# Patient Record
Sex: FEMALE | Race: OTHER | Employment: FULL TIME | URBAN - METROPOLITAN AREA
[De-identification: names, ages, dates, MRNs, and addresses within clinical notes are randomized per-mention and may not be internally consistent; named-entity substitution may affect disease eponyms.]

---

## 2018-08-31 ENCOUNTER — HOSPITAL ENCOUNTER (OUTPATIENT)
Dept: MRI IMAGING | Age: 68
Discharge: HOME | End: 2018-08-31
Payer: COMMERCIAL

## 2018-08-31 DIAGNOSIS — G95.19 NEUROGENIC CLAUDICATION (HCC): ICD-10-CM

## 2018-08-31 DIAGNOSIS — M54.50 LOW BACK PAIN: ICD-10-CM

## 2018-08-31 PROCEDURE — 72148 MRI LUMBAR SPINE W/O DYE: CPT

## 2018-10-10 ENCOUNTER — HOSPITAL ENCOUNTER (OUTPATIENT)
Dept: PREADMISSION TESTING | Age: 68
Discharge: HOME | End: 2018-10-10
Payer: MEDICARE

## 2018-10-10 VITALS
TEMPERATURE: 97.7 F | DIASTOLIC BLOOD PRESSURE: 76 MMHG | BODY MASS INDEX: 37.3 KG/M2 | HEART RATE: 50 BPM | SYSTOLIC BLOOD PRESSURE: 127 MMHG | HEIGHT: 64 IN | WEIGHT: 218.5 LBS

## 2018-10-10 LAB
ABO + RH BLD: NORMAL
ANION GAP SERPL CALC-SCNC: 7 MMOL/L (ref 5–15)
APPEARANCE UR: CLEAR
BACTERIA URNS QL MICRO: NEGATIVE /HPF
BILIRUB UR QL: NEGATIVE
BLOOD GROUP ANTIBODIES SERPL: NORMAL
BUN SERPL-MCNC: 16 MG/DL (ref 6–20)
BUN/CREAT SERPL: 24 (ref 12–20)
CALCIUM SERPL-MCNC: 8.7 MG/DL (ref 8.5–10.1)
CHLORIDE SERPL-SCNC: 106 MMOL/L (ref 97–108)
CO2 SERPL-SCNC: 28 MMOL/L (ref 21–32)
COLOR UR: NORMAL
CREAT SERPL-MCNC: 0.66 MG/DL (ref 0.55–1.02)
EPITH CASTS URNS QL MICRO: NORMAL /LPF
ERYTHROCYTE [DISTWIDTH] IN BLOOD BY AUTOMATED COUNT: 12.7 % (ref 11.5–14.5)
EST. AVERAGE GLUCOSE BLD GHB EST-MCNC: 111 MG/DL
GLUCOSE SERPL-MCNC: 87 MG/DL (ref 65–100)
GLUCOSE UR STRIP.AUTO-MCNC: NEGATIVE MG/DL
HBA1C MFR BLD: 5.5 % (ref 4.2–6.3)
HCT VFR BLD AUTO: 39.8 % (ref 35–47)
HGB BLD-MCNC: 12.7 G/DL (ref 11.5–16)
HGB UR QL STRIP: NEGATIVE
HYALINE CASTS URNS QL MICRO: NORMAL /LPF (ref 0–5)
INR PPP: 1 (ref 0.9–1.1)
KETONES UR QL STRIP.AUTO: NEGATIVE MG/DL
LEUKOCYTE ESTERASE UR QL STRIP.AUTO: NEGATIVE
MCH RBC QN AUTO: 30.8 PG (ref 26–34)
MCHC RBC AUTO-ENTMCNC: 31.9 G/DL (ref 30–36.5)
MCV RBC AUTO: 96.6 FL (ref 80–99)
NITRITE UR QL STRIP.AUTO: NEGATIVE
NRBC # BLD: 0 K/UL (ref 0–0.01)
NRBC BLD-RTO: 0 PER 100 WBC
PH UR STRIP: 6 [PH] (ref 5–8)
PLATELET # BLD AUTO: 286 K/UL (ref 150–400)
PMV BLD AUTO: 10.5 FL (ref 8.9–12.9)
POTASSIUM SERPL-SCNC: 4.3 MMOL/L (ref 3.5–5.1)
PROT UR STRIP-MCNC: NEGATIVE MG/DL
PROTHROMBIN TIME: 10.1 SEC (ref 9–11.1)
RBC # BLD AUTO: 4.12 M/UL (ref 3.8–5.2)
RBC #/AREA URNS HPF: NORMAL /HPF (ref 0–5)
SODIUM SERPL-SCNC: 141 MMOL/L (ref 136–145)
SP GR UR REFRACTOMETRY: 1.02 (ref 1–1.03)
SPECIMEN EXP DATE BLD: NORMAL
UA: UC IF INDICATED,UAUC: NORMAL
UROBILINOGEN UR QL STRIP.AUTO: 0.2 EU/DL (ref 0.2–1)
WBC # BLD AUTO: 6.4 K/UL (ref 3.6–11)
WBC URNS QL MICRO: NORMAL /HPF (ref 0–4)

## 2018-10-10 PROCEDURE — 86900 BLOOD TYPING SEROLOGIC ABO: CPT | Performed by: ORTHOPAEDIC SURGERY

## 2018-10-10 PROCEDURE — 80048 BASIC METABOLIC PNL TOTAL CA: CPT | Performed by: ORTHOPAEDIC SURGERY

## 2018-10-10 PROCEDURE — 83036 HEMOGLOBIN GLYCOSYLATED A1C: CPT | Performed by: ORTHOPAEDIC SURGERY

## 2018-10-10 PROCEDURE — 93005 ELECTROCARDIOGRAM TRACING: CPT

## 2018-10-10 PROCEDURE — 85027 COMPLETE CBC AUTOMATED: CPT | Performed by: ORTHOPAEDIC SURGERY

## 2018-10-10 PROCEDURE — 36415 COLL VENOUS BLD VENIPUNCTURE: CPT | Performed by: ORTHOPAEDIC SURGERY

## 2018-10-10 PROCEDURE — 85610 PROTHROMBIN TIME: CPT | Performed by: ORTHOPAEDIC SURGERY

## 2018-10-10 PROCEDURE — 81001 URINALYSIS AUTO W/SCOPE: CPT | Performed by: ORTHOPAEDIC SURGERY

## 2018-10-10 RX ORDER — IBUPROFEN 200 MG
600 TABLET ORAL
COMMUNITY
End: 2018-10-28

## 2018-10-10 RX ORDER — DOCUSATE SODIUM 100 MG/1
100 CAPSULE, LIQUID FILLED ORAL
COMMUNITY
End: 2018-10-31

## 2018-10-10 NOTE — PROGRESS NOTES
Problem: Patient Education: Go to Patient Education Activity Goal: Patient/Family Education Outcome: Progressing Towards Goal 
Attended pre-op spine class. Questions, concerns, and comments were addressed.

## 2018-10-11 LAB
ATRIAL RATE: 51 BPM
BACTERIA SPEC CULT: NORMAL
BACTERIA SPEC CULT: NORMAL
CALCULATED R AXIS, ECG10: -6 DEGREES
CALCULATED T AXIS, ECG11: 141 DEGREES
DIAGNOSIS, 93000: NORMAL
P-R INTERVAL, ECG05: 158 MS
Q-T INTERVAL, ECG07: 442 MS
QRS DURATION, ECG06: 90 MS
QTC CALCULATION (BEZET), ECG08: 407 MS
SERVICE CMNT-IMP: NORMAL
VENTRICULAR RATE, ECG03: 51 BPM

## 2018-10-23 NOTE — H&P
Melvin Orozco Location: [place] [address] [address] Patient #: [id number] : 1950 Single / Language: [place] / [place]ana [place]: Rose Hammond Female History of Present Illness The patient is a 80year old female who presents for a Recheck of Chronic lumbar back pain. The last clinic visit was 1 month(s) ago. Management changes made at the last visit include ordering test(s) (MRI). Symptoms include pain. Symptoms are located in the low back and in the right low back. The pain radiates to the right buttock. The patient describes the pain as aching and burning. The symptoms occur constantly. The patient describes symptoms as severe and worsening. Symptoms are exacerbated by weight bearing, standing, sitting and supine position. Symptoms are relieved by rest. Current treatment includes nonsteroidal anti-inflammatory drugs. Recently the disease has been worsening. The patient was previously evaluated in this clinic 1 month(s) ago. Past evaluation has included x-ray of the lumbar spine () and MRI of the lumbar spine. Past treatment has included nonsteroidal anti-inflammatory drugs. Problem List/Past Medical  
REVIEW OF SYSTEMS: Systems were reviewed by the provider.   
Primary osteoarthritis of left hand (715.14  M19.042)   Weight above 97th percentile (V49.89  Z78.9)   
Pain of left hand (729.5  M79.642)   Low back pain with radiation (724.3  M54.40)   
Lumbar facet arthropathy (721.3  M47.816)   Bilateral buttock pain (729.1  M79.1)   Degenerative scoliosis in adult patient (733.90  M41.50)   
Spondylolisthesis, grade 1 (738.4  M43.10)   Allergies No Known Drug Allergies Family History Cerebrovascular Accident   Mother. Heart disease in male family member before age 54   
Hypertension   Mother. Social History Caffeine use   5-6 drinks per day, Coffee. Current work status   Full-time. Exercise   5-6 times per week, walking. Marital status   Single.  
No alcohol use   
 No drug use   
Seat Belt Use   Always uses seat belts. Sun Exposure   Occasionally. Tobacco / smoke exposure   Family members smoke outdoors only. Tobacco use   Former smoker. Medication History Medications Reconciled  
 
Pregnancy / Birth History Pregnant   No. 
 
Past Surgical History Appendectomy   
Breast Biopsy   right Foot Surgery   left Hysterectomy   non-cancerous: complete and abdominal 
 
Diagnostic Studies History MRI, Spine   
 
Other Problems No pertinent past medical history   
Unspecified Diagnosis   
 
 
Physical Exam  
Neurologic Sensory Light Touch - Intact - Globally. Overall Assessment of Muscle Strength and Tone reveals Lower Extremities - Right Iliopsoas - 5/5. Left Iliopsoas - 5/5. Right Tibialis Anterior - 5/5. Left Tibialis Anterior - 5/5. Right Gastroc-Soleus - 5/5. Left Gastroc-Soleus - 5/5. Right EHL - 3+/5. Left EHL - 4-/5. General Assessment of Reflexes Right Ankle - Clonus is not present. Left Ankle - Clonus is not present. Reflexes (Dermatomes) 2/2 Normal - Left Achilles (L5-S2), Left Knee (L2-4), Right Achilles (L5-S2) and Right Knee (L2-4). Musculoskeletal 
Global Assessment Examination of related systems reveals - well-developed, well-nourished, in no acute distress, alert and oriented x 3. Gait and Station - normal gait and station and normal posture. Right Lower Extremity - normal strength and tone, normal range of motion without pain and no instability, subluxation or laxity. Left Lower Extremity - normal strength and tone, normal range of motion without pain and no instability, subluxation or laxity. Spine/Ribs/Pelvis Cervical Spine - Examination of the cervical spine reveals - no tenderness to palpation, no pain, no swelling, edema or erythema, normal cervical spine movements and normal sensation.  Thoracic (Dorsal) Spine - Examination of the thoracic spine reveals - no tenderness over thoracic vertebrae, no pain, normal sensation and normal thoracic spine movements. Lumbosacral Spine - Examination of the lumbosacral spine reveals - no known fractures or deformities. Inspection and Palpation - Tenderness - moderate. Assessment of pain reveals the following findings - The pain is characterized as - moderate. Location - pain refers to lower back bilaterally. ROJM - Trunk Extension - 15 degrees. Lumbar Spine Flexion - . Lumbosacral Spine - Functional Testing - Straight Leg Raising Test positive (Positive at 45 degrees.), Babinski Test negative, Prone Knee Bending Test negative, Slump Test negative. Assessment & Plan Spondylolisthesis, grade 1 (738.4  M43.10) Impression: The patient has failed conservative treatment. She has a degenerative scoliosis as well as severe spinal stenosis from L3-S1. I think she candidate for a lumbar laminectomy from L2-S1 with a posterior lumbar fusion at L3-4 and L4-5. She has 9 mm spondylosis C6 at L4-5 and a 4 mm spondylolisthesis at L3-4. The L3-4 spondylolisthesis reduces completely with extension and the L4-5 spondylosis is reduces to approximately 5 mm. The risks and benefits were discussed at length with the patient and the patient has elected to proceed. Indications for surgery include failed conservative treatment. Alternative treatments, risks and the perioperative course were discussed with the patient. All questions were answered. The risks and benefits of the procedure were explained. Benefits include definitive diagnosis, relief of pain, elimination of deformity and improved function. Risks of surgery including bleeding, infection, weakness, numbness, CSF leak, failure to improve symptoms, exacerbation of medical co-morbidities and even death were discussed with the patient. Degenerative scoliosis in adult patient (733.90  M41.50) Lumbar stenosis with neurogenic claudication (724.03  M48.062) Treatment options were discussed with the patient in full.(V65.49) Current Plans Pt Education - How to access health information online: discussed with patient and provided information. Pt Education - Educational materials were provided.: discussed with patient and provided information. Presurgical planning was preformed with the patient today Surgery to be scheduled PRocedure: L3-S1 lumbar laminectomy with PLF at L3-5 Signed by Judith Aguillon MD 
 
Date of Surgery Update: 
Elan Zavala was seen and examined. History and physical has been reviewed. The patient has been examined.  There have been no significant clinical changes since the completion of the originally dated History and Physical. 
 
Signed By: Judith Augillon MD   
 October 25, 2018 12:07 PM

## 2018-10-25 ENCOUNTER — ANESTHESIA (OUTPATIENT)
Dept: SURGERY | Age: 68
DRG: 455 | End: 2018-10-25
Payer: COMMERCIAL

## 2018-10-25 ENCOUNTER — APPOINTMENT (OUTPATIENT)
Dept: GENERAL RADIOLOGY | Age: 68
DRG: 455 | End: 2018-10-25
Attending: ORTHOPAEDIC SURGERY
Payer: COMMERCIAL

## 2018-10-25 ENCOUNTER — HOSPITAL ENCOUNTER (INPATIENT)
Age: 68
LOS: 3 days | Discharge: HOME HEALTH | DRG: 455 | End: 2018-10-28
Attending: ORTHOPAEDIC SURGERY | Admitting: ORTHOPAEDIC SURGERY
Payer: COMMERCIAL

## 2018-10-25 ENCOUNTER — ANESTHESIA EVENT (OUTPATIENT)
Dept: SURGERY | Age: 68
DRG: 455 | End: 2018-10-25
Payer: COMMERCIAL

## 2018-10-25 DIAGNOSIS — M48.061 SPINAL STENOSIS OF LUMBAR REGION WITHOUT NEUROGENIC CLAUDICATION: Primary | ICD-10-CM

## 2018-10-25 LAB
ABO + RH BLD: NORMAL
BLOOD GROUP ANTIBODIES SERPL: NORMAL
SPECIMEN EXP DATE BLD: NORMAL

## 2018-10-25 PROCEDURE — 77030037713 HC CLOSR DEV INCIS ZIP STRY -B: Performed by: ORTHOPAEDIC SURGERY

## 2018-10-25 PROCEDURE — 77030018836 HC SOL IRR NACL ICUM -A: Performed by: ORTHOPAEDIC SURGERY

## 2018-10-25 PROCEDURE — C1713 ANCHOR/SCREW BN/BN,TIS/BN: HCPCS | Performed by: ORTHOPAEDIC SURGERY

## 2018-10-25 PROCEDURE — 0ST20ZZ RESECTION OF LUMBAR VERTEBRAL DISC, OPEN APPROACH: ICD-10-PCS | Performed by: ORTHOPAEDIC SURGERY

## 2018-10-25 PROCEDURE — 77030034850: Performed by: ORTHOPAEDIC SURGERY

## 2018-10-25 PROCEDURE — 74011250636 HC RX REV CODE- 250/636: Performed by: PHYSICIAN ASSISTANT

## 2018-10-25 PROCEDURE — 77030031139 HC SUT VCRL2 J&J -A: Performed by: ORTHOPAEDIC SURGERY

## 2018-10-25 PROCEDURE — 77030012406 HC DRN WND PENRS BARD -A: Performed by: ORTHOPAEDIC SURGERY

## 2018-10-25 PROCEDURE — 77030038600 HC TU BPLR IRR DISP STRY -B: Performed by: ORTHOPAEDIC SURGERY

## 2018-10-25 PROCEDURE — 86900 BLOOD TYPING SEROLOGIC ABO: CPT | Performed by: ORTHOPAEDIC SURGERY

## 2018-10-25 PROCEDURE — 77030034475 HC MISC IMPL SPN: Performed by: ORTHOPAEDIC SURGERY

## 2018-10-25 PROCEDURE — 77030014647 HC SEAL FBRN TISSL BAXT -D: Performed by: ORTHOPAEDIC SURGERY

## 2018-10-25 PROCEDURE — 77030004391 HC BUR FLUT MEDT -C: Performed by: ORTHOPAEDIC SURGERY

## 2018-10-25 PROCEDURE — 74011250636 HC RX REV CODE- 250/636

## 2018-10-25 PROCEDURE — 65270000029 HC RM PRIVATE

## 2018-10-25 PROCEDURE — 76010000173 HC OR TIME 3 TO 3.5 HR INTENSV-TIER 1: Performed by: ORTHOPAEDIC SURGERY

## 2018-10-25 PROCEDURE — 77030012893

## 2018-10-25 PROCEDURE — 72020 X-RAY EXAM OF SPINE 1 VIEW: CPT

## 2018-10-25 PROCEDURE — 77030022373 HC SPCR SPN STAXX SPWV -K1: Performed by: ORTHOPAEDIC SURGERY

## 2018-10-25 PROCEDURE — 77030032490 HC SLV COMPR SCD KNE COVD -B: Performed by: ORTHOPAEDIC SURGERY

## 2018-10-25 PROCEDURE — 77030038020 HC MANFLD NEPTUNE STRY -B: Performed by: ORTHOPAEDIC SURGERY

## 2018-10-25 PROCEDURE — 0SG1071 FUSION OF 2 OR MORE LUMBAR VERTEBRAL JOINTS WITH AUTOLOGOUS TISSUE SUBSTITUTE, POSTERIOR APPROACH, POSTERIOR COLUMN, OPEN APPROACH: ICD-10-PCS | Performed by: ORTHOPAEDIC SURGERY

## 2018-10-25 PROCEDURE — 74011250636 HC RX REV CODE- 250/636: Performed by: ANESTHESIOLOGY

## 2018-10-25 PROCEDURE — 77030026438 HC STYL ET INTUB CARD -A: Performed by: NURSE ANESTHETIST, CERTIFIED REGISTERED

## 2018-10-25 PROCEDURE — 36415 COLL VENOUS BLD VENIPUNCTURE: CPT | Performed by: ORTHOPAEDIC SURGERY

## 2018-10-25 PROCEDURE — 74011000250 HC RX REV CODE- 250

## 2018-10-25 PROCEDURE — 76060000037 HC ANESTHESIA 3 TO 3.5 HR: Performed by: ORTHOPAEDIC SURGERY

## 2018-10-25 PROCEDURE — 74011250637 HC RX REV CODE- 250/637: Performed by: PHYSICIAN ASSISTANT

## 2018-10-25 PROCEDURE — 77030008684 HC TU ET CUF COVD -B: Performed by: NURSE ANESTHETIST, CERTIFIED REGISTERED

## 2018-10-25 PROCEDURE — 74011250636 HC RX REV CODE- 250/636: Performed by: ORTHOPAEDIC SURGERY

## 2018-10-25 PROCEDURE — 74011000250 HC RX REV CODE- 250: Performed by: PHYSICIAN ASSISTANT

## 2018-10-25 PROCEDURE — 77030020782 HC GWN BAIR PAWS FLX 3M -B

## 2018-10-25 PROCEDURE — 77030013079 HC BLNKT BAIR HGGR 3M -A: Performed by: NURSE ANESTHETIST, CERTIFIED REGISTERED

## 2018-10-25 PROCEDURE — 76210000016 HC OR PH I REC 1 TO 1.5 HR: Performed by: ORTHOPAEDIC SURGERY

## 2018-10-25 PROCEDURE — 77030029099 HC BN WAX SSPC -A: Performed by: ORTHOPAEDIC SURGERY

## 2018-10-25 PROCEDURE — 65270000032 HC RM SEMIPRIVATE

## 2018-10-25 PROCEDURE — 76001 XR FLUOROSCOPY OVER 60 MINUTES: CPT

## 2018-10-25 PROCEDURE — 74011000250 HC RX REV CODE- 250: Performed by: ORTHOPAEDIC SURGERY

## 2018-10-25 PROCEDURE — 0SG3071 FUSION OF LUMBOSACRAL JOINT WITH AUTOLOGOUS TISSUE SUBSTITUTE, POSTERIOR APPROACH, POSTERIOR COLUMN, OPEN APPROACH: ICD-10-PCS | Performed by: ORTHOPAEDIC SURGERY

## 2018-10-25 PROCEDURE — 0SG10AJ FUSION OF 2 OR MORE LUMBAR VERTEBRAL JOINTS WITH INTERBODY FUSION DEVICE, POSTERIOR APPROACH, ANTERIOR COLUMN, OPEN APPROACH: ICD-10-PCS | Performed by: ORTHOPAEDIC SURGERY

## 2018-10-25 DEVICE — TOP LOADING BODY
Type: IMPLANTABLE DEVICE | Site: SPINE LUMBAR | Status: FUNCTIONAL
Brand: FIREBIRD NXG

## 2018-10-25 DEVICE — GRAFT BNE SUB L CANC FRZN MORSELIZED W/ VIABLE CELL TRINITY: Type: IMPLANTABLE DEVICE | Site: SPINE LUMBAR | Status: FUNCTIONAL

## 2018-10-25 DEVICE — SET SCREW
Type: IMPLANTABLE DEVICE | Site: SPINE LUMBAR | Status: FUNCTIONAL
Brand: FIREBIRD NXG

## 2018-10-25 DEVICE — CAGE SPNL W9XH8XL25MM 15DEG LORDTC EXP CART LO PROF IMPL: Type: IMPLANTABLE DEVICE | Site: SPINE LUMBAR | Status: FUNCTIONAL

## 2018-10-25 DEVICE — GRAFT BNE SUB 7.5GM PTTY SYN SIGNAFUSE: Type: IMPLANTABLE DEVICE | Site: SPINE LUMBAR | Status: FUNCTIONAL

## 2018-10-25 DEVICE — 6.5MM X 45MM CORTICAL BONE SCREW
Type: IMPLANTABLE DEVICE | Site: SPINE LUMBAR | Status: FUNCTIONAL
Brand: JANUS

## 2018-10-25 DEVICE — 65MM ROD, PRE-LORDOSED
Type: IMPLANTABLE DEVICE | Site: SPINE LUMBAR | Status: FUNCTIONAL
Brand: FIREBIRD

## 2018-10-25 RX ORDER — ONDANSETRON 2 MG/ML
4 INJECTION INTRAMUSCULAR; INTRAVENOUS AS NEEDED
Status: DISCONTINUED | OUTPATIENT
Start: 2018-10-25 | End: 2018-10-25 | Stop reason: HOSPADM

## 2018-10-25 RX ORDER — FENTANYL CITRATE 50 UG/ML
50 INJECTION, SOLUTION INTRAMUSCULAR; INTRAVENOUS AS NEEDED
Status: DISCONTINUED | OUTPATIENT
Start: 2018-10-25 | End: 2018-10-25 | Stop reason: HOSPADM

## 2018-10-25 RX ORDER — MORPHINE SULFATE 4 MG/ML
INJECTION, SOLUTION INTRAMUSCULAR; INTRAVENOUS AS NEEDED
Status: DISCONTINUED | OUTPATIENT
Start: 2018-10-25 | End: 2018-10-25 | Stop reason: HOSPADM

## 2018-10-25 RX ORDER — AMOXICILLIN 250 MG
1 CAPSULE ORAL 2 TIMES DAILY
Status: DISCONTINUED | OUTPATIENT
Start: 2018-10-25 | End: 2018-10-28 | Stop reason: HOSPADM

## 2018-10-25 RX ORDER — SODIUM CHLORIDE 0.9 % (FLUSH) 0.9 %
5-10 SYRINGE (ML) INJECTION AS NEEDED
Status: DISCONTINUED | OUTPATIENT
Start: 2018-10-25 | End: 2018-10-28 | Stop reason: HOSPADM

## 2018-10-25 RX ORDER — NALOXONE HYDROCHLORIDE 0.4 MG/ML
0.4 INJECTION, SOLUTION INTRAMUSCULAR; INTRAVENOUS; SUBCUTANEOUS AS NEEDED
Status: DISCONTINUED | OUTPATIENT
Start: 2018-10-25 | End: 2018-10-28 | Stop reason: HOSPADM

## 2018-10-25 RX ORDER — HYDROCODONE BITARTRATE AND ACETAMINOPHEN 5; 325 MG/1; MG/1
1 TABLET ORAL
Status: DISCONTINUED | OUTPATIENT
Start: 2018-10-25 | End: 2018-10-28 | Stop reason: HOSPADM

## 2018-10-25 RX ORDER — KETOROLAC TROMETHAMINE 30 MG/ML
15 INJECTION, SOLUTION INTRAMUSCULAR; INTRAVENOUS EVERY 6 HOURS
Status: COMPLETED | OUTPATIENT
Start: 2018-10-26 | End: 2018-10-26

## 2018-10-25 RX ORDER — EPHEDRINE SULFATE 50 MG/ML
INJECTION, SOLUTION INTRAVENOUS AS NEEDED
Status: DISCONTINUED | OUTPATIENT
Start: 2018-10-25 | End: 2018-10-25 | Stop reason: HOSPADM

## 2018-10-25 RX ORDER — DIPHENHYDRAMINE HCL 25 MG
25 CAPSULE ORAL
Status: DISCONTINUED | OUTPATIENT
Start: 2018-10-25 | End: 2018-10-28 | Stop reason: HOSPADM

## 2018-10-25 RX ORDER — PROPOFOL 10 MG/ML
INJECTION, EMULSION INTRAVENOUS
Status: DISCONTINUED | OUTPATIENT
Start: 2018-10-25 | End: 2018-10-25 | Stop reason: HOSPADM

## 2018-10-25 RX ORDER — HYDROCODONE BITARTRATE AND ACETAMINOPHEN 10; 325 MG/1; MG/1
1 TABLET ORAL
Status: DISCONTINUED | OUTPATIENT
Start: 2018-10-25 | End: 2018-10-28 | Stop reason: HOSPADM

## 2018-10-25 RX ORDER — CEFAZOLIN SODIUM/WATER 2 G/20 ML
2 SYRINGE (ML) INTRAVENOUS ONCE
Status: COMPLETED | OUTPATIENT
Start: 2018-10-25 | End: 2018-10-25

## 2018-10-25 RX ORDER — SODIUM CHLORIDE 0.9 % (FLUSH) 0.9 %
5-10 SYRINGE (ML) INJECTION EVERY 8 HOURS
Status: DISCONTINUED | OUTPATIENT
Start: 2018-10-26 | End: 2018-10-28 | Stop reason: HOSPADM

## 2018-10-25 RX ORDER — MIDAZOLAM HYDROCHLORIDE 1 MG/ML
1 INJECTION, SOLUTION INTRAMUSCULAR; INTRAVENOUS AS NEEDED
Status: DISCONTINUED | OUTPATIENT
Start: 2018-10-25 | End: 2018-10-25 | Stop reason: HOSPADM

## 2018-10-25 RX ORDER — FENTANYL CITRATE 50 UG/ML
INJECTION, SOLUTION INTRAMUSCULAR; INTRAVENOUS AS NEEDED
Status: DISCONTINUED | OUTPATIENT
Start: 2018-10-25 | End: 2018-10-25 | Stop reason: HOSPADM

## 2018-10-25 RX ORDER — ONDANSETRON 2 MG/ML
4 INJECTION INTRAMUSCULAR; INTRAVENOUS
Status: ACTIVE | OUTPATIENT
Start: 2018-10-25 | End: 2018-10-26

## 2018-10-25 RX ORDER — ROPIVACAINE HYDROCHLORIDE 5 MG/ML
30 INJECTION, SOLUTION EPIDURAL; INFILTRATION; PERINEURAL AS NEEDED
Status: DISCONTINUED | OUTPATIENT
Start: 2018-10-25 | End: 2018-10-25 | Stop reason: HOSPADM

## 2018-10-25 RX ORDER — SODIUM CHLORIDE 0.9 % (FLUSH) 0.9 %
5-10 SYRINGE (ML) INJECTION EVERY 8 HOURS
Status: DISCONTINUED | OUTPATIENT
Start: 2018-10-25 | End: 2018-10-25 | Stop reason: HOSPADM

## 2018-10-25 RX ORDER — SODIUM CHLORIDE 0.9 % (FLUSH) 0.9 %
5-10 SYRINGE (ML) INJECTION AS NEEDED
Status: DISCONTINUED | OUTPATIENT
Start: 2018-10-25 | End: 2018-10-25 | Stop reason: HOSPADM

## 2018-10-25 RX ORDER — SODIUM CHLORIDE 9 MG/ML
125 INJECTION, SOLUTION INTRAVENOUS CONTINUOUS
Status: DISPENSED | OUTPATIENT
Start: 2018-10-25 | End: 2018-10-26

## 2018-10-25 RX ORDER — ACETAMINOPHEN 500 MG
1000 TABLET ORAL EVERY 6 HOURS
Status: DISCONTINUED | OUTPATIENT
Start: 2018-10-26 | End: 2018-10-28 | Stop reason: HOSPADM

## 2018-10-25 RX ORDER — ROCURONIUM BROMIDE 10 MG/ML
INJECTION, SOLUTION INTRAVENOUS AS NEEDED
Status: DISCONTINUED | OUTPATIENT
Start: 2018-10-25 | End: 2018-10-25 | Stop reason: HOSPADM

## 2018-10-25 RX ORDER — POLYETHYLENE GLYCOL 3350 17 G/17G
17 POWDER, FOR SOLUTION ORAL DAILY
Status: DISCONTINUED | OUTPATIENT
Start: 2018-10-26 | End: 2018-10-28 | Stop reason: HOSPADM

## 2018-10-25 RX ORDER — FACIAL-BODY WIPES
10 EACH TOPICAL DAILY PRN
Status: DISCONTINUED | OUTPATIENT
Start: 2018-10-27 | End: 2018-10-28 | Stop reason: HOSPADM

## 2018-10-25 RX ORDER — FENTANYL CITRATE 50 UG/ML
25 INJECTION, SOLUTION INTRAMUSCULAR; INTRAVENOUS
Status: DISCONTINUED | OUTPATIENT
Start: 2018-10-25 | End: 2018-10-25 | Stop reason: HOSPADM

## 2018-10-25 RX ORDER — PROPOFOL 10 MG/ML
INJECTION, EMULSION INTRAVENOUS AS NEEDED
Status: DISCONTINUED | OUTPATIENT
Start: 2018-10-25 | End: 2018-10-25 | Stop reason: HOSPADM

## 2018-10-25 RX ORDER — PHENYLEPHRINE HCL IN 0.9% NACL 0.4MG/10ML
SYRINGE (ML) INTRAVENOUS AS NEEDED
Status: DISCONTINUED | OUTPATIENT
Start: 2018-10-25 | End: 2018-10-25 | Stop reason: HOSPADM

## 2018-10-25 RX ORDER — DOCUSATE SODIUM 100 MG/1
200 CAPSULE, LIQUID FILLED ORAL
Status: DISCONTINUED | OUTPATIENT
Start: 2018-10-25 | End: 2018-10-28 | Stop reason: HOSPADM

## 2018-10-25 RX ORDER — MORPHINE SULFATE IN 0.9 % NACL 150MG/30ML
PATIENT CONTROLLED ANALGESIA SYRINGE INTRAVENOUS
Status: DISCONTINUED | OUTPATIENT
Start: 2018-10-25 | End: 2018-10-28 | Stop reason: HOSPADM

## 2018-10-25 RX ORDER — SODIUM CHLORIDE, SODIUM LACTATE, POTASSIUM CHLORIDE, CALCIUM CHLORIDE 600; 310; 30; 20 MG/100ML; MG/100ML; MG/100ML; MG/100ML
INJECTION, SOLUTION INTRAVENOUS
Status: DISCONTINUED | OUTPATIENT
Start: 2018-10-25 | End: 2018-10-25 | Stop reason: HOSPADM

## 2018-10-25 RX ORDER — GLYCOPYRROLATE 0.2 MG/ML
INJECTION INTRAMUSCULAR; INTRAVENOUS AS NEEDED
Status: DISCONTINUED | OUTPATIENT
Start: 2018-10-25 | End: 2018-10-25 | Stop reason: HOSPADM

## 2018-10-25 RX ORDER — SUCCINYLCHOLINE CHLORIDE 20 MG/ML
INJECTION INTRAMUSCULAR; INTRAVENOUS AS NEEDED
Status: DISCONTINUED | OUTPATIENT
Start: 2018-10-25 | End: 2018-10-25 | Stop reason: HOSPADM

## 2018-10-25 RX ORDER — ONDANSETRON 2 MG/ML
INJECTION INTRAMUSCULAR; INTRAVENOUS AS NEEDED
Status: DISCONTINUED | OUTPATIENT
Start: 2018-10-25 | End: 2018-10-25 | Stop reason: HOSPADM

## 2018-10-25 RX ORDER — SODIUM CHLORIDE 9 MG/ML
25 INJECTION, SOLUTION INTRAVENOUS CONTINUOUS
Status: DISCONTINUED | OUTPATIENT
Start: 2018-10-25 | End: 2018-10-25 | Stop reason: HOSPADM

## 2018-10-25 RX ORDER — MIDAZOLAM HYDROCHLORIDE 1 MG/ML
0.5 INJECTION, SOLUTION INTRAMUSCULAR; INTRAVENOUS
Status: DISCONTINUED | OUTPATIENT
Start: 2018-10-25 | End: 2018-10-25 | Stop reason: HOSPADM

## 2018-10-25 RX ORDER — MIDAZOLAM HYDROCHLORIDE 1 MG/ML
INJECTION, SOLUTION INTRAMUSCULAR; INTRAVENOUS AS NEEDED
Status: DISCONTINUED | OUTPATIENT
Start: 2018-10-25 | End: 2018-10-25 | Stop reason: HOSPADM

## 2018-10-25 RX ORDER — LIDOCAINE HYDROCHLORIDE 10 MG/ML
0.1 INJECTION, SOLUTION EPIDURAL; INFILTRATION; INTRACAUDAL; PERINEURAL AS NEEDED
Status: DISCONTINUED | OUTPATIENT
Start: 2018-10-25 | End: 2018-10-25 | Stop reason: HOSPADM

## 2018-10-25 RX ORDER — SODIUM CHLORIDE, SODIUM LACTATE, POTASSIUM CHLORIDE, CALCIUM CHLORIDE 600; 310; 30; 20 MG/100ML; MG/100ML; MG/100ML; MG/100ML
100 INJECTION, SOLUTION INTRAVENOUS CONTINUOUS
Status: DISCONTINUED | OUTPATIENT
Start: 2018-10-25 | End: 2018-10-25 | Stop reason: HOSPADM

## 2018-10-25 RX ORDER — FENTANYL CITRATE 50 UG/ML
25 INJECTION, SOLUTION INTRAMUSCULAR; INTRAVENOUS
Status: DISCONTINUED | OUTPATIENT
Start: 2018-10-25 | End: 2018-10-28 | Stop reason: HOSPADM

## 2018-10-25 RX ORDER — LIDOCAINE HYDROCHLORIDE 20 MG/ML
INJECTION, SOLUTION EPIDURAL; INFILTRATION; INTRACAUDAL; PERINEURAL AS NEEDED
Status: DISCONTINUED | OUTPATIENT
Start: 2018-10-25 | End: 2018-10-25 | Stop reason: HOSPADM

## 2018-10-25 RX ORDER — DEXAMETHASONE SODIUM PHOSPHATE 4 MG/ML
INJECTION, SOLUTION INTRA-ARTICULAR; INTRALESIONAL; INTRAMUSCULAR; INTRAVENOUS; SOFT TISSUE AS NEEDED
Status: DISCONTINUED | OUTPATIENT
Start: 2018-10-25 | End: 2018-10-25 | Stop reason: HOSPADM

## 2018-10-25 RX ORDER — SODIUM CHLORIDE, SODIUM LACTATE, POTASSIUM CHLORIDE, CALCIUM CHLORIDE 600; 310; 30; 20 MG/100ML; MG/100ML; MG/100ML; MG/100ML
25 INJECTION, SOLUTION INTRAVENOUS CONTINUOUS
Status: DISCONTINUED | OUTPATIENT
Start: 2018-10-25 | End: 2018-10-25 | Stop reason: HOSPADM

## 2018-10-25 RX ORDER — MORPHINE SULFATE 10 MG/ML
2 INJECTION, SOLUTION INTRAMUSCULAR; INTRAVENOUS
Status: DISCONTINUED | OUTPATIENT
Start: 2018-10-25 | End: 2018-10-25 | Stop reason: HOSPADM

## 2018-10-25 RX ORDER — CEFAZOLIN SODIUM/WATER 2 G/20 ML
2 SYRINGE (ML) INTRAVENOUS EVERY 8 HOURS
Status: COMPLETED | OUTPATIENT
Start: 2018-10-26 | End: 2018-10-26

## 2018-10-25 RX ADMIN — Medication 80 MCG: at 15:54

## 2018-10-25 RX ADMIN — Medication 80 MCG: at 15:50

## 2018-10-25 RX ADMIN — PROPOFOL 50 MG: 10 INJECTION, EMULSION INTRAVENOUS at 17:03

## 2018-10-25 RX ADMIN — FENTANYL CITRATE 50 MCG: 50 INJECTION, SOLUTION INTRAMUSCULAR; INTRAVENOUS at 15:42

## 2018-10-25 RX ADMIN — INSULIN ASPART INJ 100 UNIT/ML 60 ML: at 17:45

## 2018-10-25 RX ADMIN — DEXAMETHASONE SODIUM PHOSPHATE 4 MG: 4 INJECTION, SOLUTION INTRA-ARTICULAR; INTRALESIONAL; INTRAMUSCULAR; INTRAVENOUS; SOFT TISSUE at 15:49

## 2018-10-25 RX ADMIN — MIDAZOLAM HYDROCHLORIDE 2 MG: 1 INJECTION, SOLUTION INTRAMUSCULAR; INTRAVENOUS at 15:33

## 2018-10-25 RX ADMIN — Medication 120 MCG: at 16:03

## 2018-10-25 RX ADMIN — FENTANYL CITRATE 150 MCG: 50 INJECTION, SOLUTION INTRAMUSCULAR; INTRAVENOUS at 16:25

## 2018-10-25 RX ADMIN — MORPHINE SULFATE 2 MG: 4 INJECTION, SOLUTION INTRAMUSCULAR; INTRAVENOUS at 18:13

## 2018-10-25 RX ADMIN — ONDANSETRON 4 MG: 2 INJECTION INTRAMUSCULAR; INTRAVENOUS at 18:13

## 2018-10-25 RX ADMIN — GLYCOPYRROLATE 0.2 MG: 0.2 INJECTION INTRAMUSCULAR; INTRAVENOUS at 16:59

## 2018-10-25 RX ADMIN — Medication 40 MCG: at 17:47

## 2018-10-25 RX ADMIN — LIDOCAINE HYDROCHLORIDE 100 MG: 20 INJECTION, SOLUTION EPIDURAL; INFILTRATION; INTRACAUDAL; PERINEURAL at 15:41

## 2018-10-25 RX ADMIN — Medication 40 MCG: at 17:57

## 2018-10-25 RX ADMIN — SODIUM CHLORIDE, SODIUM LACTATE, POTASSIUM CHLORIDE, CALCIUM CHLORIDE: 600; 310; 30; 20 INJECTION, SOLUTION INTRAVENOUS at 17:08

## 2018-10-25 RX ADMIN — PROPOFOL 150 MG: 10 INJECTION, EMULSION INTRAVENOUS at 15:43

## 2018-10-25 RX ADMIN — PROPOFOL 100 MG: 10 INJECTION, EMULSION INTRAVENOUS at 16:25

## 2018-10-25 RX ADMIN — EPHEDRINE SULFATE 10 MG: 50 INJECTION, SOLUTION INTRAVENOUS at 16:54

## 2018-10-25 RX ADMIN — SODIUM CHLORIDE, SODIUM LACTATE, POTASSIUM CHLORIDE, AND CALCIUM CHLORIDE 25 ML/HR: 600; 310; 30; 20 INJECTION, SOLUTION INTRAVENOUS at 14:07

## 2018-10-25 RX ADMIN — Medication 2 G: at 16:08

## 2018-10-25 RX ADMIN — Medication: at 19:14

## 2018-10-25 RX ADMIN — PROPOFOL 50 MCG/KG/MIN: 10 INJECTION, EMULSION INTRAVENOUS at 16:02

## 2018-10-25 RX ADMIN — PROPOFOL 30 MG: 10 INJECTION, EMULSION INTRAVENOUS at 17:02

## 2018-10-25 RX ADMIN — SODIUM CHLORIDE, SODIUM LACTATE, POTASSIUM CHLORIDE, CALCIUM CHLORIDE: 600; 310; 30; 20 INJECTION, SOLUTION INTRAVENOUS at 15:26

## 2018-10-25 RX ADMIN — Medication 120 MCG: at 15:46

## 2018-10-25 RX ADMIN — SUCCINYLCHOLINE CHLORIDE 140 MG: 20 INJECTION INTRAMUSCULAR; INTRAVENOUS at 15:43

## 2018-10-25 RX ADMIN — Medication 80 MCG: at 15:48

## 2018-10-25 RX ADMIN — SENNOSIDES AND DOCUSATE SODIUM 1 TABLET: 8.6; 5 TABLET ORAL at 21:00

## 2018-10-25 RX ADMIN — ROCURONIUM BROMIDE 5 MG: 10 INJECTION, SOLUTION INTRAVENOUS at 15:43

## 2018-10-25 RX ADMIN — SODIUM CHLORIDE 125 ML/HR: 900 INJECTION, SOLUTION INTRAVENOUS at 20:07

## 2018-10-25 RX ADMIN — EPHEDRINE SULFATE 10 MG: 50 INJECTION, SOLUTION INTRAVENOUS at 16:05

## 2018-10-25 RX ADMIN — ONDANSETRON 4 MG: 2 INJECTION INTRAMUSCULAR; INTRAVENOUS at 15:49

## 2018-10-25 RX ADMIN — FENTANYL CITRATE 50 MCG: 50 INJECTION, SOLUTION INTRAMUSCULAR; INTRAVENOUS at 15:37

## 2018-10-25 RX ADMIN — PROPOFOL 20 MG: 10 INJECTION, EMULSION INTRAVENOUS at 17:01

## 2018-10-25 RX ADMIN — EPHEDRINE SULFATE 10 MG: 50 INJECTION, SOLUTION INTRAVENOUS at 16:29

## 2018-10-25 NOTE — BRIEF OP NOTE
BRIEF OPERATIVE NOTE Date of Procedure: 10/25/2018 Preoperative Diagnosis: SPONDYLOLITHIASIS, STENOSIS, SCOLIOSIS Postoperative Diagnosis: SPONDYLOLITHIASIS, STENOSIS, SCOLIOSIS Procedure(s): 
L3-S1 LUMBAR LAMINECTOMY WITH POSTERIOR LUMBAR FUSION L3-5 Surgeon(s) and Role: Luciano Gómez MD - Primary Surgical Assistant: Rudolph Haider PA-C Surgical Staff: 
Circ-1: Abbi Branch RN 
[facility]: [facility] Phys[facility] Assistant: LUCRETIA Acosta Radiology Technician: RT Niall, R Scrub Tech-1: Ben Witt Scrub Tech-2: Stormy Mullet Float Staff: Danielle Harry RN Event Time In Time Out Incision Start [phone] Incision Close 1832 Anesthesia: General  
Estimated Blood Loss: 300cc Specimens: * No specimens in log * Findings: Lumbar stenosis Complications: None Implants:  
Implant Name Type Inv. Item Serial No.  Lot No. LRB No. Used Action GRAFT BNE [employer] --  - [id number]  GRAFT BNE [employer] LG --  [id number] MUSCULOSKELETAL TRANS 7510 N/A 1 Implanted GRAFT BNE [employer] LG --  - [id number]  GRAFT BNE [employer] LG --  [id number] MUSCULOSKELETAL TRANS 7510 N/A 1 Implanted NEVOS SPONGE Bone  NA AMENDIA [id number] N/A 1 Implanted NEVOS SPONGE Bone   AMENDIA [id number] N/A 1 Implanted

## 2018-10-26 LAB
ANION GAP SERPL CALC-SCNC: 4 MMOL/L (ref 5–15)
BUN SERPL-MCNC: 18 MG/DL (ref 6–20)
BUN/CREAT SERPL: 26 (ref 12–20)
CALCIUM SERPL-MCNC: 7.5 MG/DL (ref 8.5–10.1)
CHLORIDE SERPL-SCNC: 112 MMOL/L (ref 97–108)
CO2 SERPL-SCNC: 22 MMOL/L (ref 21–32)
CREAT SERPL-MCNC: 0.7 MG/DL (ref 0.55–1.02)
GLUCOSE SERPL-MCNC: 117 MG/DL (ref 65–100)
HGB BLD-MCNC: 9.6 G/DL (ref 11.5–16)
POTASSIUM SERPL-SCNC: 4.2 MMOL/L (ref 3.5–5.1)
SODIUM SERPL-SCNC: 138 MMOL/L (ref 136–145)

## 2018-10-26 PROCEDURE — 74011250637 HC RX REV CODE- 250/637: Performed by: PHYSICIAN ASSISTANT

## 2018-10-26 PROCEDURE — 80048 BASIC METABOLIC PNL TOTAL CA: CPT | Performed by: PHYSICIAN ASSISTANT

## 2018-10-26 PROCEDURE — G8979 MOBILITY GOAL STATUS: HCPCS

## 2018-10-26 PROCEDURE — 85018 HEMOGLOBIN: CPT | Performed by: PHYSICIAN ASSISTANT

## 2018-10-26 PROCEDURE — 65270000029 HC RM PRIVATE

## 2018-10-26 PROCEDURE — G8978 MOBILITY CURRENT STATUS: HCPCS

## 2018-10-26 PROCEDURE — 97165 OT EVAL LOW COMPLEX 30 MIN: CPT

## 2018-10-26 PROCEDURE — 97530 THERAPEUTIC ACTIVITIES: CPT

## 2018-10-26 PROCEDURE — 74011000250 HC RX REV CODE- 250: Performed by: PHYSICIAN ASSISTANT

## 2018-10-26 PROCEDURE — 97161 PT EVAL LOW COMPLEX 20 MIN: CPT

## 2018-10-26 PROCEDURE — 74011250636 HC RX REV CODE- 250/636: Performed by: PHYSICIAN ASSISTANT

## 2018-10-26 PROCEDURE — G8988 SELF CARE GOAL STATUS: HCPCS

## 2018-10-26 PROCEDURE — 97116 GAIT TRAINING THERAPY: CPT

## 2018-10-26 PROCEDURE — 36415 COLL VENOUS BLD VENIPUNCTURE: CPT | Performed by: PHYSICIAN ASSISTANT

## 2018-10-26 PROCEDURE — G8987 SELF CARE CURRENT STATUS: HCPCS

## 2018-10-26 RX ORDER — HYDROCODONE BITARTRATE AND ACETAMINOPHEN 5; 325 MG/1; MG/1
1 TABLET ORAL
Qty: 50 TAB | Refills: 0 | Status: SHIPPED | OUTPATIENT
Start: 2018-10-26 | End: 2018-10-29

## 2018-10-26 RX ADMIN — Medication 10 ML: at 05:42

## 2018-10-26 RX ADMIN — KETOROLAC TROMETHAMINE 15 MG: 30 INJECTION, SOLUTION INTRAMUSCULAR; INTRAVENOUS at 00:00

## 2018-10-26 RX ADMIN — ACETAMINOPHEN 1000 MG: 500 TABLET ORAL at 00:00

## 2018-10-26 RX ADMIN — POLYETHYLENE GLYCOL 3350 17 G: 17 POWDER, FOR SOLUTION ORAL at 08:51

## 2018-10-26 RX ADMIN — KETOROLAC TROMETHAMINE 15 MG: 30 INJECTION, SOLUTION INTRAMUSCULAR; INTRAVENOUS at 14:06

## 2018-10-26 RX ADMIN — Medication 2 G: at 08:51

## 2018-10-26 RX ADMIN — HYDROCODONE BITARTRATE AND ACETAMINOPHEN 1 TABLET: 5; 325 TABLET ORAL at 08:51

## 2018-10-26 RX ADMIN — ACETAMINOPHEN 1000 MG: 500 TABLET ORAL at 14:06

## 2018-10-26 RX ADMIN — DOCUSATE SODIUM 200 MG: 100 CAPSULE, LIQUID FILLED ORAL at 20:59

## 2018-10-26 RX ADMIN — Medication 10 ML: at 21:00

## 2018-10-26 RX ADMIN — Medication 2 G: at 00:00

## 2018-10-26 RX ADMIN — KETOROLAC TROMETHAMINE 15 MG: 30 INJECTION, SOLUTION INTRAMUSCULAR; INTRAVENOUS at 19:16

## 2018-10-26 RX ADMIN — ACETAMINOPHEN 1000 MG: 500 TABLET ORAL at 19:16

## 2018-10-26 RX ADMIN — SENNOSIDES AND DOCUSATE SODIUM 1 TABLET: 8.6; 5 TABLET ORAL at 08:51

## 2018-10-26 RX ADMIN — Medication 10 ML: at 14:00

## 2018-10-26 RX ADMIN — SENNOSIDES AND DOCUSATE SODIUM 1 TABLET: 8.6; 5 TABLET ORAL at 19:16

## 2018-10-26 RX ADMIN — KETOROLAC TROMETHAMINE 15 MG: 30 INJECTION, SOLUTION INTRAMUSCULAR; INTRAVENOUS at 06:00

## 2018-10-26 NOTE — PROGRESS NOTES
Orthopedic Spine Progress Note Post Op day: 1 Day Post-Op 2018 7:50 AM  
Admit Date: 10/25/2018 Procedure: Procedure(s): 
L3-S1 LUMBAR LAMINECTOMY WITH POSTERIOR LUMBAR FUSION L3-5 Subjective:  
 
Parag Seaman appears well. Pain seems to be managed. No N/V Drain in place Newsome in place Pain Control:  
Pain Assessment Pain Scale 1: Numeric (0 - 10) Pain Intensity 1: 3 Pain Onset 1: post op Pain Location 1: Back Pain Orientation 1: Lower Pain Description 1: Sore, Constant Pain Intervention(s) 1: Encouraged PCA Objective:  
 
 
  
Physical Exam: 
General:  Alert and oriented. No acute distress. Heart:  Respirations unlabored. Abdomen:  
Extremities: Soft, non-tender. No evidence of cyanosis. Pulses palpable in both upper and lower extremities. Neurologic: 
Musculoskeletal:  No new motor deficits. Neurovascular exam within normal limits. Sensation stable. Motor: unchanged C5-T1 and L2-S1. Carolina's sign negative in bilateral lower extremities. Calves soft, nontender upon palpation and with passive twitch. Moves both upper and lower extremities. Incision: clean, dry, and intact. No significant erythema or swelling. No active drainage noted. Vital Signs:   
Blood pressure 102/61, pulse 65, temperature 97.6 °F (36.4 °C), resp. rate 14, weight 102 kg (224 lb 13.9 oz), SpO2 94 %. Temp (24hrs), Av.9 °F (36.6 °C), Min:97.2 °F (36.2 °C), Max:98.2 °F (36.8 °C) LAB:   
Recent Labs 10/26/18 
8185 HGB 9.6* Lab Results Component Value Date/Time Sodium 138 10/26/2018 02:53 AM  
 Potassium 4.2 10/26/2018 02:53 AM  
 Chloride 112 (H) 10/26/2018 02:53 AM  
 CO2 22 10/26/2018 02:53 AM  
 Glucose 117 (H) 10/26/2018 02:53 AM  
 BUN 18 10/26/2018 02:53 AM  
 Creatinine 0.70 10/26/2018 02:53 AM  
 Calcium 7.5 (L) 10/26/2018 02:53 AM  
 
 
Intake/Output:No intake/output data recorded. 10/24 1901 - 10/26 0700 In: 1981 [P.O.:581; I.V.:1400] Out: 8626 [Urine:785; Drains:380] Assessment:  
Patient is 1 Day Post-Op s/p Procedure(s): 
L3-S1 LUMBAR LAMINECTOMY WITH POSTERIOR LUMBAR FUSION L3-5 Obese - BMI 38.4 (224lbs, 5'4\") Plan: 1. PT/OT 2. Continue established methods of pain control 3. VTE Prophylaxes - TEDS & SCDs 4. Encourage use of ICS 5. Remove Newsome once mobilizing with PT 
6. Remove drain once output is <80 
7. Discharge pending Signed By: Carol Mcdaniel PA-C

## 2018-10-26 NOTE — PROGRESS NOTES
Problem: Mobility Impaired (Adult and Pediatric) Goal: *Acute Goals and Plan of Care (Insert Text) Physical Therapy Goals Initiated 10/26/2018 1. Patient will move from supine to sit and sit to supine , scoot up and down and roll side to side in bed with independence within 4 days. 2. Patient will perform sit to stand with independence within 4 days. 3. Patient will ambulate with modified independence for 150 feet with the least restrictive device within 4 days. 4. Patient will ascend/descend 4 stairs with B handrail(s) with modified independence within 4 days. 5. Patient will verbalize and demonstrate understanding of spinal precautions (No bending, lifting greater than 5 lbs, or twisting; log-roll technique; frequent repositioning as instructed) within 4 days. physical Therapy TREATMENT Patient: Lisa Chen (79 y.o. female) Date: 10/26/2018 Precautions: Back, Fall Chart, physical therapy assessment, plan of care and goals were reviewed. ASSESSMENT: 
Chart reviewed and RN cleared patient for pm mobility session. Patient received sitting up in chair with brace donned and agreeable to work with therapy. Patient able to recall 3/3 back precautions but required verbal cuing to adhere to them during mobility. Patient completed sit<>stand transfer with CGA and ambulated 100ft using RW with CGA. Patient demonstrated slight forward flexion during ambulation, and heavy reliance on RW. Patient requesting to use bathroom. Reviewed technique for pericare while maintaining precautions. During pericare, patient bumped IV in hand and bleeding noted. Nursing present to clean and check IV. Patient ambulated back to bed. Brace doffed and patient returned supine using log roll technique with verbal cuing and additional time. Will attempt stairs next session to ensure patient is able to safely enter/exit her home as she lives alone and plans to return home at discharge. Progression toward goals: 
[x]      Improving appropriately and progressing toward goals 
[]      Improving slowly and progressing toward goals 
[]      Not making progress toward goals and plan of care will be adjusted PLAN: 
Patient continues to benefit from skilled intervention to address the above impairments. Continue treatment per established plan of care. Discharge Recommendations:  Home Health Further Equipment Recommendations for Discharge:  none SUBJECTIVE:  
Patient stated I need to put less pressure through my arms or they are going to be sore later.  The patient stated 3/3 back precautions. Reviewed all 3 with patient. OBJECTIVE DATA SUMMARY:  
Functional Mobility Training: 
Bed Mobility: 
Log Rolling: Stand-by assistance Supine to Sit: Minimum assistance Sit to Supine: Stand-by assistance; Additional time Scooting: Stand-by assistance Brace donned with  total assistance Transfers: 
Sit to Stand: Minimum assistance Stand to Sit: Minimum assistance Bed to Chair: Minimum assistance Ambulation/Gait Training: 
Distance (ft): 100 Feet (ft) Assistive Device: Gait belt;Walker, rolling Ambulation - Level of Assistance: Contact guard assistance Gait Abnormalities: Decreased step clearance;Shuffling gait Base of Support: Widened Speed/Leatha: Pace decreased (<100 feet/min); Shuffled Step Length: Left shortened;Right shortened Stairs: Therapeutic Exercises:  
 
Pain: 
Pain Scale 1: Numeric (0 - 10) Pain Intensity 1: 3 Pain Location 1: Back Pain Orientation 1: Lower Pain Description 1: Aching Pain Intervention(s) 1: Medication (see MAR)Activity Tolerance:  
VSS, NAD Please refer to the flowsheet for vital signs taken during this treatment. After treatment:  
[]  Patient left in no apparent distress sitting up in chair 
[x]  Patient left in no apparent distress in bed 
[x]  Call bell left within reach [x]  Nursing notified 
[]  Caregiver present 
[]  Bed alarm activated COMMUNICATION/COLLABORATION:  
The patients plan of care was discussed with: Occupational Therapist and Registered Nurse Dano Vasquez PT, DPT Time Calculation: 31 mins

## 2018-10-26 NOTE — PROGRESS NOTES
Problem: Mobility Impaired (Adult and Pediatric) Goal: *Acute Goals and Plan of Care (Insert Text) Physical Therapy Goals Initiated 10/26/2018 1. Patient will move from supine to sit and sit to supine , scoot up and down and roll side to side in bed with independence within 4 days. 2. Patient will perform sit to stand with independence within 4 days. 3. Patient will ambulate with modified independence for 150 feet with the least restrictive device within 4 days. 4. Patient will ascend/descend 4 stairs with B handrail(s) with modified independence within 4 days. 5. Patient will verbalize and demonstrate understanding of spinal precautions (No bending, lifting greater than 5 lbs, or twisting; log-roll technique; frequent repositioning as instructed) within 4 days. physical Therapy EVALUATION Patient: Haydee Burciaga (79 y.o. female) Date: 10/26/2018 Primary Diagnosis: SPONDYLOLITHIASIS, STENOSIS, SCOLIOSIS Lumbar stenosis Spinal stenosis, lumbar Procedure(s) (LRB): 
L3-S1 LUMBAR LAMINECTOMY WITH POSTERIOR LUMBAR FUSION L3-5 (N/A) 1 Day Post-Op Precautions: FALL, BACK  Back, Fall ASSESSMENT : 
Based on the objective data described below, the patient presents with decreased mobility compared to baseline function s/p L3-S1 laminectomy L3-5 fusion POD1. Patient presents with pain, impaired balance, decreased strength, and decreased ROM limiting functional mobility. Chart reviewed and RN cleared patient for mobility assessment at this time. Patient received lying in bed. Educated patient on back precautions, log roll technique, and sitting restrictions. Patient's pain is well managed at this time. Patient performed log roll technique with SBA and verbal cuing, and transferred supine to sitting EOB with Jayce. Able to scoot with SBA. Donned TLSO brace with maxA. Sit<>stands with Jayce.   Patient ambulated 20ft with CGA using RW with decreased gait velocity, decreased step length B, and decreased step clearance. Session ended with patient sitting up in a chair with all needs met and nursing updated. Of note, patient lives alone in a 1 story home with 4 stairs to enter with B handrails. She works full time, was independent with all ADLs and was Helen using a walking stick with ambulation. Recommending HH PT upon discharge to maximize patient safety and independence with mobility. Patient will benefit from skilled intervention to address the above impairments. Patients rehabilitation potential is considered to be Good Factors which may influence rehabilitation potential include:  
[]         None noted 
[]         Mental ability/status []         Medical condition 
[x]         Home/family situation and support systems: lives alone 
[]         Safety awareness 
[]         Pain tolerance/management 
[]         Other: PLAN : 
Recommendations and Planned Interventions: 
[x]           Bed Mobility Training             []    Neuromuscular Re-Education 
[x]           Transfer Training                   []    Orthotic/Prosthetic Training 
[x]           Gait Training                         [x]    Modalities [x]           Therapeutic Exercises           []    Edema Management/Control 
[x]           Therapeutic Activities            [x]    Patient and Family Training/Education 
[]           Other (comment): Frequency/Duration: Patient will be followed by physical therapy  twice daily to address goals. Discharge Recommendations: Home Health Further Equipment Recommendations for Discharge: none SUBJECTIVE:  
Patient stated Eather Fleischer in mind I live alone so need to be able to do everything without help.  OBJECTIVE DATA SUMMARY:  
HISTORY:   
Past Medical History:  
Diagnosis Date  Achilles tendon tear  Osteoarthritis  Varicose vein of leg Past Surgical History:  
Procedure Laterality Date [address]  HX BREAST BIOPSY FATTY LUMP - BENIGN  
 HX CYSTOCELE REPAIR    
 HX HYSTERECTOMY  HX KNEE ARTHROSCOPY Left MENISCUS REPAIR  
 HX ORTHOPAEDIC    
 STEM CELL INJECTIONS (21) FOR LT. ANKLE WOUND  HX ORTHOPAEDIC Left ACHILLES TENDON REPAIR, DIDN'T WORK  
 HX RECTOCELE REPAIR    
 HX UROLOGICAL CYSTOCELE, RECTOCELE REPAIR  
 HX WRIST FRACTURE TX Right Prior Level of Function/Home Situation: Lives alone in 1 story home with 4 LLOYD B handrails. Works full time, independent with ADLs and Helen using hiking stick with mobility PTA Personal factors and/or comorbidities impacting plan of care: lives alone, PMH Home Situation Home Environment: Private residence # Steps to Enter: 4 Rails to Enter: Yes Hand Rails : Bilateral 
One/Two Story Residence: One story Living Alone: Yes Support Systems: Friends \ neighbors Patient Expects to be Discharged to[de-identified] Private residence Current DME Used/Available at Home: Cane, straight, Walker, rolling, Raised toilet seat, Shower chair Tub or Shower Type: Tub/Shower combinationEXAMINATION/PRESENTATION/DECISION MAKING: Critical Behavior: 
Neurologic State: Alert Orientation Level: Oriented X4 Cognition: Follows commands Hearing: 
 Skin:   
Edema:  
Range Of Motion: 
AROM: Generally decreased, functional 
  
  
  
PROM: Generally decreased, functional 
  
  
  
Strength:   
Strength: Generally decreased, functional 
  
  
  
  
  
  
Tone & Sensation:  
Tone: Normal 
  
  
  
  
Sensation: Intact Coordination: 
Coordination: Within functional limits Vision:  
  
Functional Mobility: 
Bed Mobility: 
Rolling: Stand-by assistance Supine to Sit: Minimum assistance Scooting: Stand-by assistance Transfers: 
Sit to Stand: Minimum assistance Stand to Sit: Minimum assistance Bed to Chair: Minimum assistance Balance:  
Sitting: Intact Standing: Impaired; With support Standing - Static: Good Standing - Dynamic : GoodAmbulation/Gait Training:Distance (ft): 20 Feet (ft) Assistive Device: Gait belt;Walker, rolling Ambulation - Level of Assistance: Contact guard assistance Gait Abnormalities: Decreased step clearance;Shuffling gait Base of Support: Widened Speed/Leatha: Pace decreased (<100 feet/min); Shuffled Step Length: Left shortened;Right shortened Stairs: Therapeutic Exercises:  
 
 
Functional Measure: 
Barthel Index: 
 
Bathin Bladder: 0 Bowels: 10 
Groomin Dressin Feeding: 10 Mobility: 0 Stairs: 0 Toilet Use: 5 Transfer (Bed to Chair and Back): 10 Total: 45 Barthel and G-code impairment scale: 
Percentage of impairment CH 
0% CI 
1-19% CJ 
20-39% CK 
40-59% CL 
60-79% CM 
80-99% CN 
100% Barthel Score 0-100 100 99-80 79-60 59-40 20-39 1-19 
 0 Barthel Score 0-20 20 17-19 13-16 9-12 5-8 1-4 0 The Barthel ADL Index: Guidelines 1. The index should be used as a record of what a patient does, not as a record of what a patient could do. 2. The main aim is to establish degree of independence from any help, physical or verbal, however minor and for whatever reason. 3. The need for supervision renders the patient not independent. 4. A patient's performance should be established using the best available evidence. Asking the patient, friends/relatives and nurses are the usual sources, but direct observation and common sense are also important. However direct testing is not needed. 5. Usually the patient's performance over the preceding 24-48 hours is important, but occasionally longer periods will be relevant. 6. Middle categories imply that the patient supplies over 50 per cent of the effort. 7. Use of aids to be independent is allowed. Zoila De Luna., Barthel, D.W. (2138). Functional evaluation: the Barthel Index. [facility] (14)2.  
JIMBO Tilley, Charlette Sweeney., Amado Lopez., Felicia Shannon. (1999). Measuring the change indisability after inpatient rehabilitation; comparison of the responsiveness of the Barthel Index and Functional [place] Measure. Journal of Neurology, Neurosurgery, and Psychiatry, 66(4), 584-130. BRENDA Welsh, JASON Villegas, & Dianne Gamboa M.A. (2004.) Assessment of post-stroke quality of life in cost-effectiveness studies: The usefulness of the Barthel Index and the EuroQoL-5D. [facility], 13, 326-76 G codes: In compliance with CMSs Claims Based Outcome Reporting, the following G-code set was chosen for this patient based on their primary functional limitation being treated: The outcome measure chosen to determine the severity of the functional limitation was the Barthel with a score of 45/100 which was correlated with the impairment scale. ? Mobility - Walking and Moving Around:  
  - CURRENT STATUS: CK - 40%-59% impaired, limited or restricted  - GOAL STATUS: CJ - 20%-39% impaired, limited or restricted  - D/C STATUS:  ---------------To be determined--------------- Physical Therapy Evaluation Charge Determination History Examination Presentation Decision-Making HIGH Complexity :3+ comorbidities / personal factors will impact the outcome/ POC  HIGH Complexity : 4+ Standardized tests and measures addressing body structure, function, activity limitation and / or participation in recreation  LOW Complexity : Stable, uncomplicated  LOW Complexity : FOTO score of  Based on the above components, the patient evaluation is determined to be of the following complexity level: LOW Pain: 
  
Pain Intensity 1: 3 Activity Tolerance:  
Visit Vitals /51 Pulse 70 Temp 97.3 °F (36.3 °C) Resp 14 Wt 102 kg (224 lb 13.9 oz) SpO2 95% BMI 38.60 kg/m² Please refer to the flowsheet for vital signs taken during this treatment. After treatment: [x]         Patient left in no apparent distress sitting up in chair 
[]         Patient left in no apparent distress in bed 
[x]         Call bell left within reach [x]         Nursing notified 
[]         Caregiver present 
[]         Bed alarm activated COMMUNICATION/EDUCATION:  
The patients plan of care was discussed with: Registered Nurse. [x]         Fall prevention education was provided and the patient/caregiver indicated understanding. [x]         Patient/family have participated as able in goal setting and plan of care. [x]         Patient/family agree to work toward stated goals and plan of care. []         Patient understands intent and goals of therapy, but is neutral about his/her participation. []         Patient is unable to participate in goal setting and plan of care. Thank you for this referral. 
Kym Orellana, PT, DPT Time Calculation: 41 mins

## 2018-10-26 NOTE — OP NOTES
[address] [place] [address] REPORT    Sruthi Miller  MR#: [id number]  : 1950  ACCOUNT #: [de-identified]   DATE OF SERVICE: 10/25/2018    PREOPERATIVE DIAGNOSES:  Lumbar spondylolisthesis, L3-L4, L4-L5, lumbar stenosis L3-S1. POSTOPERATIVE DIAGNOSES:  Lumbar spondylolisthesis, L3-L4, L4-L5, lumbar stenosis L3-S1. PROCEDURE PERFORMED:  Lumbar laminectomy L3-L4, L4-L5, L5-S1, posterior lumbar fusion L3-L4, L4-L5, transforaminal interbody fusion, L3-L4, L4-L5, posterior segmental instrumentation, L3-S1.    ESTIMATED BLOOD LOSS:  300 mL. COMPLICATIONS:  None. DRAINS:  One medium Hemovac. INSTRUMENTATION:  Includes Medtronic Solera pedicle screws and Medtronic Elevate interbodies. SURGEON:  Melanie Hearn MD    ASSISTANT:  Ricky Boudreaux PA-C    ANESTHESIA:  General    SPECIMENS REMOVED:  None    IMPLANTS:  Orthofix Janus     INDICATIONS:  A 71-year-old female with neurogenic claudication from spondylolisthesis, L3-L4, L4-L5 with spinal stenosis. She had failed conservative treatment and understood the risks and benefits, and elected to proceed. DESCRIPTION OF PROCEDURE:  Patient was identified, brought to the operative suite and underwent general anesthesia without difficulty. Newsome catheter placed. Two grams Ancef given to the patient preoperatively. Preoperative neuromonitoring was placed and baselines obtained. These remained stable throughout the surgical procedure. Back was prepped and draped sterilely. After prepping and draping, timeout performed. Midline incision was made. Dissection was carried down through the thoracodorsal fascia. The posterior elements from inferior portion of L2 down to the superior portion of the sacrum. Transverse processes were L3, L4, and L5 were identified and cleared of soft tissue as well as the muscle on the  ligament. Deep retractors were placed.   We confirmed on x-ray the appropriate levels and then we started a wide laminectomy and removal of spinous process of L5, L4, L3, inferior portion of L2. A central laminectomy was started with undercutting the lamina with #3 and #4 Kerrisons. We had severe stenosis, particularly at the interval of L3-L4 and L4-L5 due to facet hypertrophy as well as anterolisthesis as well as ligament hypertrophy. Wide facet resections were performed as well as decompression with resection of the superior articular process for decompression of the lateral recess foraminal region. Wide pedicle to pedicle decompression was obtained at which time we then using standard bony landmarks cannulated L3, L4, L5. These were all tested with the ball tip probe with neuromonitoring and 6.5 x 45 mm screws were placed at each level and tested again up to 20 milliamps. Due to the collapse of the disk space, we then did bilateral annulotomies at L3-4 and L4-L5 from the left side. Complete evacuation of the disk space with a trial spacer with pituitary rongeurs, curved curettes. We then did trial spacers to approximately 10 mm on each level. We then did a collagen sponge allograft soaked in the patient's blood. We placed a medium cortical sponge into each interspace as well as  Monica allograft. This was followed by 2 Spine Wave StaXx cage 9 x 22 mm with 15 degrees lordosis. These were impacted across the disk space and under fluoroscopy. Then, expanded approximately 10 mm. Good reduction of the deformity was noted. We then irrigated the wound with Pulsavac with bacitracin 2000 mL. We decorticated transverse process of L3, L4, L5, placed the remainder of the Monica allograft as well as the Signafuse and the local bone into the lateral gutters followed by 65 mm longitudinal rods attached to the pedicle screws and set screws. Final tightening performed. Final x-rays demonstrate stable fixation. The patient had standard closure and the patient returned to the PACU in stable condition.       Heidi Cortes. MD Chaz Lim / Osvaldo Rocha  D: 10/26/2018 12:14     T: 10/26/2018 16:16  JOB #: [id number]

## 2018-10-26 NOTE — ROUTINE PROCESS
TRANSFER - IN REPORT: 
 
Verbal report received from Kayode Richards RN(name) on Melvin Orozco  being received from [facility]) for routine post - op Report consisted of patients Situation, Background, Assessment and  
Recommendations(SBAR). Information from the following report(s) SBAR, Kardex, OR Summary, Procedure Summary, Intake/Output, MAR and Recent Results was reviewed with the receiving nurse. Opportunity for questions and clarification was provided. Assessment completed upon patients arrival to unit and care assumed.

## 2018-10-26 NOTE — PERIOP NOTES
TRANSFER - OUT REPORT: 
 
Verbal report given to Seema CAI(name) on Melvin Orozco  being transferred to 538(unit) for routine post - op Report consisted of patients Situation, Background, Assessment and  
Recommendations(SBAR). Time Pre op antibiotic given:1608 Anesthesia Stop time: 80 Newsome Present on Transfer to floor:yes Order for Newsome on Chart:yes Discharge Prescriptions with Chart:no Information from the following report(s) SBAR, OR Summary, Procedure Summary, Intake/Output, MAR, Recent Results, Med Rec Status and Cardiac Rhythm NSR was reviewed with the receiving nurse. Opportunity for questions and clarification was provided. Is the patient on 02? YES 
     L/Min 2 Other nc Is the patient on a monitor? NO Is the nurse transporting with the patient? NO Surgical Waiting Area notified of patient's transfer from PACU? YES The following personal items collected during your admission accompanied patient upon transfer:  
Dental Appliance: Dental Appliances: None Vision: Visual Aid: None Hearing Aid:   
Jewelry:   
Clothing: Clothing: (belongings sent to [employer]) Other Valuables:   
Valuables sent to safe:

## 2018-10-26 NOTE — DISCHARGE INSTRUCTIONS
After Hospital Care Plan:  Discharge Instructions Lumbar Fusion Surgery   Dr. Mer Miller   Patient Name: Haydee Burciaga    Date of procedure: 10/25/2018  Date of discharge:     Procedure: Procedure(s):  L3-S1 LUMBAR LAMINECTOMY WITH POSTERIOR LUMBAR FUSION L3-5  PCP: Georgia Alanis MD    Follow up appointments  -follow up with Dr. Dr. Mer Miller in 2 weeks. Call [phone] to make an appointment as soon as you get home from the hospital.    [address] [place], [place] [address]y: ____________________   phone: _______________________  The agency will contact you to arrange dates/times for visits. Please call them if you do not hear from them within 24 hours after you are discharged  Physical therapy 3 times a week for 3 weeks  Nursing-initial assessment and as needed    When to call your Orthopaedic Surgeon:  -Signs of infection-if your incision is red; continues to have drainage; drainage has a foul odor or if you have a persistent fever over 101 degrees for 24 hours  -Nausea or vomiting, severe headache  -Loss of bowel or bladder function, inability to urinate  -Changes in sensation in your arms or legs (numbness, tingling, loss of color)  -Increased weakness-greater than before your surgery  -Severe pain or pain not relieved by medications  -Signs of a blood clot in your leg-calf pain, tenderness, redness, swelling of lower leg    When to call your Primary Care Physician:  -Concerns about medical conditions such as diabetes, high blood pressure, asthma, congestive heart failure  -Call if blood sugars are elevated, persistent headache or dizziness, coughing or congestion, constipation or diarrhea, burning with urination, abnormal heart rate    When to call 911 and go to the nearest emergency room:  -Acute onset of chest pain, shortness of breath, difficulty breathing    Activity  -You are going home a well person, be as active as possible. Your only exercise should be walking.   Start with short frequent walks and increase your walking distance each day.  -Limit the amount of time you sit to 20-30 minute intervals. Sitting for prolonged periods of time will be uncomfortable for you following surgery.  -Do NOT lift anything over 5 pounds  -Do NOT do any straining, twisting or bending  -When you are in bed, you may lay on your back or on either side. Do NOT lie on your stomach    Brace  -If you have a back brace, you should wear your brace at all times when you are out of bed. Do not wear the brace while in bed or showering.  -Remember to always wear a cotton t-shirt underneath your brace.  -Do not bend or twist when your brace is off    Diet  -Resume usual diet; drink plenty of fluids; eat foods high in fiber  -It is important to have regular bowel movements. Pain medications may cause constipation. You may want to take a stool softener (such as Senokot-S or Colace) to prevent constipation.   -If constipation occurs, take a laxative (such as Dulcolax tablets, Milk of Magnesia, or a suppository). Laxatives should only be used if the above preventable measures have failed and you still have not had a bowel movement after three days    Driving  -You may not drive or return to work until instructed by your physician. However, you may ride in the car for short periods of time. Incision Care  Your incision has been closed with absorbable sutures and the Zipline skin closure system. This will assist with healing. The Adrian Hones is to remain on your incision for 2 weeks. A dry dressing (ABD and tape) will be placed over it and should be changed daily, for at least the first several days after your surgery. If you have no incisional drainage, you may leave the incision open to air if you wish, still leaving the Zipline in place. Please make sure to wash your hands prior to touching your dressing. You may take brief showers but do not run the water directly onto the wound.  After your shower, blot your incision dry with a soft towel and replace the dry dressing. Do not allow the tape to come in contact with the Zipline. Do not rub or apply any lotions or ointments to your incision site. Do not soak or scrub your wound. The Zipline dressing will be removed during your two week follow-up appointment. If you experience drainage leaking from underneath the Zipline or if it peels off before 2 weeks, please contact your orthopedic surgeons office. Showering  -You may shower in approximately 4 days after your surgery.    -Leave the dressing on during your shower. Do NOT allow the water to run directly onto your dressing. Once you get out of the shower, gently pat the dressing dry. -Reminder- your brace can be removed while showering. Remember to not bend or twist while your brace is off.    -Do not take a tub bath. Preventing blood clots  -You have been given T.E.D. stockings to wear. Continue to wear these for 7 days after your discharge. Put them on in the morning and take them off at night.    -They are used to increase your circulation and prevent blood clots from forming in your legs  -T. E.D. stockings can be machine washed, temperature not to exceed 160° F (71°C) and machine dried for 15 to 20 minutes, temperature not to exceed 250° F (121°C). Pain management  -Take pain medication as prescribed; decrease the amount you use as your pain lessens  -DO not wait until you are in extreme pain to take your medication.  -Avoid alcoholic beverages while taking pain medication    Pain Medication Safety  DO:  -Read the Medication Guide   -Take your medicine exactly as prescribed   -Store your medicine away from children and in a safe place   -Flush unused medicine down the toilet   -Call your healthcare provider for medical advice about side effects. You may report side effects to FDA at 3-876-FDA-9659.   -Please be aware that many medications contain Tylenol.   We do not want you to over medicate so please read the information below as a guide. Do not take more than 4 Grams of Tylenol in a 24 hour period. (There are 1000 milligrams in one Gram)                                                                                                                                                                                                                                                Percocet contains 325 mg of Tylenol per tablet (do not take more than 12 tablets in 24 hours)  Lortab contains 500 mg of Tylenol per tablet (do not take more than 8 tablets in 24 hours)  Norco contains 325 mg of Tylenol per tablet (do not take more than 12 tablets in 24 hours). DO NOT:  -Do not give your medicine to others   -Do not take medicine unless it was prescribed for you   -Do not stop taking your medicine without talking to your healthcare provider   -Do not break, chew, crush, dissolve, or inject your medicine. If you cannot swallow your medicine whole, talk to your healthcare provider.  -Do not drink alcohol while taking this medicine  -Do not take anti-inflammatory medications or aspirin unless instructed by your     physician.

## 2018-10-26 NOTE — PROGRESS NOTES
Problem: Self Care Deficits Care Plan (Adult) Goal: *Acute Goals and Plan of Care (Insert Text) Occupational Therapy Goals Initiated 10/26/2018 1. Patient will perform lower body dressing with modified independence using AE PRN within 4 days. 2.  Patient will perform toileting with modified independence using most appropriate DME within 4 days. 3.  Patient will perform gathering ADL items high and low 2/2 at modified independence within 4 days. 4.  Patient will don/doff back brace at supervision/set-up within 4 days. 5.  Patient will verbalize/demonstrate 3/3 back precautions during ADL tasks without cues within 4 days. Occupational Therapy EVALUATION Patient: Maximus Real (79 y.o. female) Date: 10/26/2018 Primary Diagnosis: SPONDYLOLITHIASIS, STENOSIS, SCOLIOSIS Lumbar stenosis Spinal stenosis, lumbar Procedure(s) (LRB): 
L3-S1 LUMBAR LAMINECTOMY WITH POSTERIOR LUMBAR FUSION L3-5 (N/A) 1 Day Post-Op Precautions:   Back, Fall ASSESSMENT : 
Based on the objective data described below, the patient presents with reported independence to moderate A upper and lower body ADLs. TLSO already fabricated and in patient room. May only need to see one time to ensure demonstration of tasks s/p surgery as patient has practiced all pre-op but not post-op and lives alone. ADLs are limited by pain, back precautions, girth and standing tolerance. Recommend with nursing patient to complete as able in order to maintain strength, endurance and independence: ADLs with supervision/setup, OOB to chair 3x/day and mobilizing to the bathroom for toileting with no assist. Thank you for your assistance. Patient will benefit from skilled intervention to address the above impairments. Patients rehabilitation potential is considered to be Good Factors which may influence rehabilitation potential include:  
[x]             None noted []             Mental ability/status []             Medical condition []             Home/family situation and support systems []             Safety awareness []             Pain tolerance/management 
[]             Other: PLAN : 
Recommendations and Planned Interventions: 
[x]               Self Care Training                  [x]        Therapeutic Activities [x]               Functional Mobility Training    []        Cognitive Retraining 
[x]               Therapeutic Exercises           [x]        Endurance Activities [x]               Balance Training                   []        Neuromuscular Re-Education []               Visual/Perceptual Training     [x]   Home Safety Training 
[x]               Patient Education                 [x]        Family Training/Education []               Other (comment): Frequency/Duration: Patient will be followed by occupational therapy 4 times a week to address goals. Discharge Recommendations: Home Health Further Equipment Recommendations for Discharge: reacher SUBJECTIVE:  
Patient stated I need to be independent because I live alone but I have practiced it all before surgery.  OBJECTIVE DATA SUMMARY:  
HISTORY:  
Past Medical History:  
Diagnosis Date  Achilles tendon tear  Osteoarthritis  Varicose vein of leg Past Surgical History:  
Procedure Laterality Date [address]  HX BREAST BIOPSY FATTY LUMP - BENIGN  
 HX CYSTOCELE REPAIR    
 HX HYSTERECTOMY  HX KNEE ARTHROSCOPY Left MENISCUS REPAIR  
 HX ORTHOPAEDIC    
 STEM CELL INJECTIONS (21) FOR LT. ANKLE WOUND  HX ORTHOPAEDIC Left ACHILLES TENDON REPAIR, DIDN'T WORK  
 HX RECTOCELE REPAIR    
 HX UROLOGICAL CYSTOCELE, RECTOCELE REPAIR  
 HX WRIST FRACTURE TX Right Prior Level of Function/Environment/Context: independent with all ADLs, standing to shower, using towel rack as a guide to get in and out of tub, sits to shower and tailor sitting for lower body dressing, R > L LE painful and tingling with no resolve regardless of position. Practiced with TLSO pre-op, dons overhead, back v. Wall and then tighten all straps. Works full time as a . Has a long handled shoe horn with hook so she can dress herself PRN. Occupations in which the patient is/was successful, what are the barriers preventing that success:  
Performance Patterns (routines, roles, habits, and rituals):  
Personal Interests and/or values:  
Expanded or extensive additional review of patient history:  
 
Home Situation Home Environment: Private residence # Steps to Enter: 4 Rails to Enter: Yes Hand Rails : Bilateral 
One/Two Story Residence: One story Living Alone: Yes Support Systems: Friends \ neighbors Patient Expects to be Discharged to[de-identified] Private residence Current DME Used/Available at Home: Cane, straight, Walker, rolling, Raised toilet seat, Shower chair Tub or Shower Type: Tub/Shower combination Hand dominance: RightEXAMINATION OF PERFORMANCE DEFICITS: 
Cognitive/Behavioral Status: 
Neurologic State: Alert Orientation Level: Oriented X4 Cognition: Appropriate decision making; Appropriate for age attention/concentration; Appropriate safety awareness Perception: Appears intact Perseveration: No perseveration noted Safety/Judgement: Awareness of environment;Good awareness of safety precautions Skin: intact Edema: intact Hearing: 
  
Vision/Perceptual:   
    
    
    
  
    
Acuity: Impaired near vision(baseline) Corrective Lenses: Reading glasses Range of Motion: 
 
AROM: Generally decreased, functional 
PROM: Generally decreased, functional 
  
  
  
  
  
  
Strength: 
 
Strength: Generally decreased, functional 
  
  
  
  
Coordination: 
Coordination: Within functional limits Fine Motor Skills-Upper: Left Intact; Right Intact Gross Motor Skills-Upper: Left Intact; Right Intact Tone & Sensation: 
 
Tone: Normal 
Sensation: Intact Balance: Sitting: Intact Standing: Impaired; With support Standing - Static: Good Standing - Dynamic : Good Functional Mobility and Transfers for ADLs:Bed Mobility: with PT 
Rolling: Stand-by assistance Supine to Sit: Minimum assistance Scooting: Stand-by assistance Transfers: with PT Sit to Stand: Minimum assistance Stand to Sit: Minimum assistance Bed to Chair: Minimum assistance ADL Assessment: 
Feeding: Independent Oral Facial Hygiene/Grooming: Supervision Bathing: Moderate assistance Upper Body Dressing: Supervision Lower Body Dressing: Moderate assistance Toileting: Moderate assistance 
  
 patient reporting above ADLs, infer from ROM functional reach to back side, fair tailor sitting and verbalization of techniques. Patient recalled 3/3 back precautions. ADL Intervention and task modifications: 
  
 
  Patient instructed and indicated understanding the benefits of maintaining activity tolerance, functional mobility, and independence with self care tasks during acute stay  to ensure safe return home and to baseline. Encouraged patient to increase frequency and duration OOB, not sitting longer than 30 mins without marching/walking with staff, be out of bed for all meals, perform daily ADLs (as approved by RN/MD regarding bathing etc), and performing functional mobility to/from bathroom. Patient instruction and indicated understanding on body mechanics, ergonomics and gravitational force on the spine during different body positions to plan activities in prep for return home to complete basic ADLs, instrumental ADLs and back to work safely. Dressing lower body: Patient instructed to don brace first and on the benefits to remain seated to don all clothing to increase independence with precautions and pain management. Toileting: Patient instructed on the benefits of using flushable wet wipes and toilet tongs if decreased reach or pain for amy care.  Also, the benefits of a reacher to aid in clothing management. Standing: Patient instructed to increase amount of time standing in order to increase independence and tolerance with ADLs. During prolonged standing, can open cabinet door or place foot on stool to decrease spinal pressure/increase pain. Tub transfer: Patient instructed and indicated understanding regarding when it is safe to begin transfer into tub (complete stairs with PT, advance exercises with PT high enough to clear tub height, and while clothes donned practice with another person present). Cognitive Retraining Safety/Judgement: Awareness of environment;Good awareness of safety precautions Therapeutic Exercise: 
 Patient instructed on the benefits shoulder flexion exercises to increase independence with ADLs, active ROM, and strength of spine. Patient demonstrated 5 reps and 1 set(s) while supine with Modified independent. Patient instructed and demonstrated techniques of activating abdomen and pelvic floor muscles. Patient instructed and indicated understanding how to progress reps, sets, against gravity to then working up to 5 lbs until surgeon clears for increased weight, supine to sitting and then standing. Can use household items as weights. Functional Measure: 
Barthel Index: 
 
Bathin Bladder: 0 Bowels: 10 
Groomin Dressin Feeding: 10 Mobility: 0 Stairs: 0 Toilet Use: 5 Transfer (Bed to Chair and Back): 10 Total: 45 Barthel and G-code impairment scale: 
Percentage of impairment CH 
0% CI 
1-19% CJ 
20-39% CK 
40-59% CL 
60-79% CM 
80-99% CN 
100% Barthel Score 0-100 100 99-80 79-60 59-40 20-39 1-19 
 0 Barthel Score 0-20 20 17-19 13-16 9-12 5-8 1-4 0 The Barthel ADL Index: Guidelines 1. The index should be used as a record of what a patient does, not as a record of what a patient could do.  
2. The main aim is to establish degree of independence from any help, physical or verbal, however minor and for whatever reason. 3. The need for supervision renders the patient not independent. 4. A patient's performance should be established using the best available evidence. Asking the patient, friends/relatives and nurses are the usual sources, but direct observation and common sense are also important. However direct testing is not needed. 5. Usually the patient's performance over the preceding 24-48 hours is important, but occasionally longer periods will be relevant. 6. Middle categories imply that the patient supplies over 50 per cent of the effort. 7. Use of aids to be independent is allowed. Eduardo Bernstein., Barthel, DMagaliW. (2900). Functional evaluation: the Barthel Index. [facility] (14)2. JIMBO Smith, Law oLcke., Bean Stanley., [place], [address] [place] [address] (1999). Measuring the change indisability after inpatient rehabilitation; comparison of the responsiveness of the Barthel Index and Functional [place] Measure. Journal of Neurology, Neurosurgery, and Psychiatry, 66(4), 388-176. Shabana Araujo, N.J.A, JASON Villegas, & Iris Wilde MMagaliA. (2004.) Assessment of post-stroke quality of life in cost-effectiveness studies: The usefulness of the Barthel Index and the EuroQoL-5D. [facility], 13, 855-72 G codes: In compliance with CMSs Claims Based Outcome Reporting, the following G-code set was chosen for this patient based on their primary functional limitation being treated: The outcome measure chosen to determine the severity of the functional limitation was the Barthel Index with a score of 45/100 which was correlated with the impairment scale. ? Self Care:  
  - CURRENT STATUS: CK - 40%-59% impaired, limited or restricted  - GOAL STATUS: CI - 1%-19% impaired, limited or restricted  - D/C STATUS:  ---------------To be determined---------------  
 
 
Pain: 
  
Pain Intensity 1: 3 Activity Tolerance:  
Vitals:  
 10/26/18 0538 10/26/18 0259 10/26/18 0304 10/26/18 0372 BP: 102/64 (!) 84/52 102/61 100/51 BP 1 Location: Left arm Left arm BP Patient Position: At rest;Lying right side  Sitting Pulse: 74 76 65 70 Resp: 14 14  14 Temp: 97.6 °F (36.4 °C) 97.6 °F (36.4 °C)  97.3 °F (36.3 °C) SpO2: 94% 94%  95% Weight:      
 
 
Please refer to the flowsheet for vital signs taken during this treatment. After treatment:  
[] Patient left in no apparent distress sitting up in chair 
[x] Patient left in no apparent distress in bed 
[x] Call bell left within reach [x] Nursing notified 
[] Caregiver present 
[] Bed alarm activated COMMUNICATION/EDUCATION:  
The patients plan of care was discussed with: Physical Therapist and Registered Nurse. [x] Home safety education was provided and the patient/caregiver indicated understanding. [x] Patient/family have participated as able in goal setting and plan of care. [x] Patient/family agree to work toward stated goals and plan of care. [] Patient understands intent and goals of therapy, but is neutral about his/her participation. [] Patient is unable to participate in goal setting and plan of care. This patients plan of care is appropriate for delegation to [facility]. Thank you for this referral. 
Kwan Sanchez Time Calculation: 40 mins

## 2018-10-27 LAB — HGB BLD-MCNC: 8.2 G/DL (ref 11.5–16)

## 2018-10-27 PROCEDURE — 36415 COLL VENOUS BLD VENIPUNCTURE: CPT | Performed by: PHYSICIAN ASSISTANT

## 2018-10-27 PROCEDURE — 74011250637 HC RX REV CODE- 250/637: Performed by: PHYSICIAN ASSISTANT

## 2018-10-27 PROCEDURE — 51798 US URINE CAPACITY MEASURE: CPT

## 2018-10-27 PROCEDURE — 85018 HEMOGLOBIN: CPT | Performed by: PHYSICIAN ASSISTANT

## 2018-10-27 PROCEDURE — 97535 SELF CARE MNGMENT TRAINING: CPT

## 2018-10-27 PROCEDURE — 97116 GAIT TRAINING THERAPY: CPT

## 2018-10-27 PROCEDURE — 74011000250 HC RX REV CODE- 250: Performed by: PHYSICIAN ASSISTANT

## 2018-10-27 PROCEDURE — 65270000029 HC RM PRIVATE

## 2018-10-27 RX ADMIN — DOCUSATE SODIUM 200 MG: 100 CAPSULE, LIQUID FILLED ORAL at 20:27

## 2018-10-27 RX ADMIN — Medication 10 ML: at 18:34

## 2018-10-27 RX ADMIN — ACETAMINOPHEN 1000 MG: 500 TABLET ORAL at 06:54

## 2018-10-27 RX ADMIN — ACETAMINOPHEN 1000 MG: 500 TABLET ORAL at 14:46

## 2018-10-27 RX ADMIN — SENNOSIDES AND DOCUSATE SODIUM 1 TABLET: 8.6; 5 TABLET ORAL at 18:33

## 2018-10-27 RX ADMIN — POLYETHYLENE GLYCOL 3350 17 G: 17 POWDER, FOR SOLUTION ORAL at 09:05

## 2018-10-27 RX ADMIN — HYDROCODONE BITARTRATE AND ACETAMINOPHEN 1 TABLET: 5; 325 TABLET ORAL at 11:18

## 2018-10-27 RX ADMIN — Medication 10 ML: at 20:28

## 2018-10-27 RX ADMIN — SENNOSIDES AND DOCUSATE SODIUM 1 TABLET: 8.6; 5 TABLET ORAL at 09:05

## 2018-10-27 NOTE — PROGRESS NOTES
Problem: Mobility Impaired (Adult and Pediatric) Goal: *Acute Goals and Plan of Care (Insert Text) Physical Therapy Goals Initiated 10/26/2018 1. Patient will move from supine to sit and sit to supine , scoot up and down and roll side to side in bed with independence within 4 days. 2. Patient will perform sit to stand with independence within 4 days. 3. Patient will ambulate with modified independence for 150 feet with the least restrictive device within 4 days. 4. Patient will ascend/descend 4 stairs with B handrail(s) with modified independence within 4 days. 5. Patient will verbalize and demonstrate understanding of spinal precautions (No bending, lifting greater than 5 lbs, or twisting; log-roll technique; frequent repositioning as instructed) within 4 days. physical Therapy TREATMENT Patient: Melvin Orozco (79 y.o. female) Date: 10/27/2018 Diagnosis: SPONDYLOLITHIASIS, STENOSIS, SCOLIOSIS Lumbar stenosis Spinal stenosis, lumbar <principal problem not specified> Procedure(s) (LRB): 
L3-S1 LUMBAR LAMINECTOMY WITH POSTERIOR LUMBAR FUSION L3-5 (N/A) 2 Days Post-Op Precautions: Back, Fall, TLSO Chart, physical therapy assessment, plan of care and goals were reviewed. ASSESSMENT:  Communicated with nursing and pain meds given x 30 min prior to session. Patient c/o 4/10 pain at rest in seated position with TLSO on. Eager to ambulate and perform stairs today in anticipation of discharge tomorrow. Patient performing functional mobility at SBA/CGA level today. Ambulates with RW, brace and decreased gait speed with trunk flexion. Corrects trunk flexion with verbal cues. Patient ambulates 80' with RW. She then ascend/descended 4 stairs with 2 rails CGA. Patient returned to room and requests back to bed. Sit to R sideling SBA with verbal cues to not twist back while in R sideling. Placed pillow for tactile cue behind back.   Patient demonstrates good safety with ambulation and stair climbing and has been cleared for discharge when medically able. Patient is cleared for discharge from PT standpoint:  YES [x]     NO [] Progression toward goals: 
[x]      Improving appropriately and progressing toward goals [x]      Improving slowly and progressing toward goals 
[]      Not making progress toward goals and plan of care will be adjusted PLAN: 
Patient continues to benefit from skilled intervention to address the above impairments. Continue treatment per established plan of care. Discharge Recommendations:  Home Health Further Equipment Recommendations for Discharge:  Has RW, Alisson ferraro, TLSO SUBJECTIVE:  
Patient stated I am doing much better than yesterday.  The patient stated 3/3 back precautions. Reviewed all 3 with patient. OBJECTIVE DATA SUMMARY:  
Critical Behavior: 
Neurologic State: Alert, Appropriate for age Orientation Level: Appropriate for age Cognition: Appropriate decision making, Appropriate for age attention/concentration, Appropriate safety awareness, Follows commands Safety/Judgement: Awareness of environment, Good awareness of safety precautions Functional Mobility Training: 
Bed Mobility: 
Log   
Supine to Sit: Stand-by assistance Sit to Supine: Stand-by assistance Brace doffed with  minimal assistance/contact guard assist. Donned upon entry. Transfers: 
Sit to Stand: Stand-by assistance Stand to Sit: Stand-by assistance Balance: 
Sitting: Intact Standing: Intact; With support Standing - Static: Good Standing - Dynamic : GoodAmbulation/Gait Training: 
Distance (ft): 135 Feet (ft) Assistive Device: Gait belt;Brace/Splint; Walker, rolling Ambulation - Level of Assistance: Contact guard assistance Base of Support: Widened Speed/Leatha: Pace decreased (<100 feet/min) Step Length: Left shortened;Right shortened Stairs: 
Number of Stairs Trained: 4 Stairs - Level of Assistance: Contact guard assistance Rail Use: Both 
Pain: 
  
Pain Intensity 1: 6 Activity Tolerance:  
See above Please refer to the flowsheet for vital signs taken during this treatment. After treatment:  
[]  Patient left in no apparent distress sitting up in chair 
[x]  Patient left in no apparent distress in bed 
[x]  Call bell left within reach [x]  Nursing notified 
[]  Caregiver present 
[]  Bed alarm activated COMMUNICATION/COLLABORATION:  
The patients plan of care was discussed with: Registered Nurse Nick Hammond DPT Time Calculation: 17 mins

## 2018-10-27 NOTE — PROGRESS NOTES
Cm received home health order. Cm met with the patient, introduced self and explained role. Freedom of choice was offered and the patient chose [facility] ([phone]). A referral was sent to [facility] and they accepted the referral and will see the patient on Monday. Cm put the information on the discharge instructions (After Visit Summary)

## 2018-10-27 NOTE — PROGRESS NOTES
Orthopedic Spine Progress Note Post Op day: 2 Day Post-Op 2018 7:50 AM  
Admit Date: 10/25/2018 Procedure: Procedure(s): 
L3-S1 LUMBAR LAMINECTOMY WITH POSTERIOR LUMBAR FUSION L3-5 Subjective:  
 
[place] appears well. Pain seems to be managed. No N/V Drain in place Newsome in place Pain Control:  
Pain Assessment Pain Scale 1: Numeric (0 - 10) Pain Intensity 1: 6 Pain Onset 1: post op Pain Location 1: Back Pain Orientation 1: Lower Pain Description 1: Sore Pain Intervention(s) 1: Repositioned, Rest 
 
Objective:  
 
 
  
Physical Exam: 
General:  Alert and oriented. No acute distress. Heart:  Respirations unlabored. Abdomen:  
Extremities: Soft, non-tender. No evidence of cyanosis. Pulses palpable in both upper and lower extremities. Neurologic: 
Musculoskeletal:  No new motor deficits. Neurovascular exam within normal limits. Sensation stable. Motor: unchanged C5-T1 and L2-S1. Carolina's sign negative in bilateral lower extremities. Calves soft, nontender upon palpation and with passive twitch. Moves both upper and lower extremities. Incision: clean, dry, and intact. No significant erythema or swelling. No active drainage noted. Vital Signs:   
Blood pressure 105/57, pulse 68, temperature 98 °F (36.7 °C), resp. rate 14, weight 102 kg (224 lb 13.9 oz), SpO2 95 %. Temp (24hrs), Av.2 °F (36.8 °C), Min:98 °F (36.7 °C), Max:98.6 °F (37 °C) LAB:   
Recent Labs 10/27/18 
0254 HGB 8.2* Lab Results Component Value Date/Time Sodium 138 10/26/2018 02:53 AM  
 Potassium 4.2 10/26/2018 02:53 AM  
 Chloride 112 (H) 10/26/2018 02:53 AM  
 CO2 22 10/26/2018 02:53 AM  
 Glucose 117 (H) 10/26/2018 02:53 AM  
 BUN 18 10/26/2018 02:53 AM  
 Creatinine 0.70 10/26/2018 02:53 AM  
 Calcium 7.5 (L) 10/26/2018 02:53 AM  
 
 
Intake/Output:No intake/output data recorded. 10/25 1901 - 10/27 0700 In: 332 [P.O.:581; I.V.:100] Out: 7146 [TORP; Drains:720] Assessment:  
Patient is 2 Day Post-Op s/p Procedure(s): 
L3-S1 LUMBAR LAMINECTOMY WITH POSTERIOR LUMBAR FUSION L3-5 Obese - BMI 38.4 (224lbs, 5'4\") Plan: 1. PT/OT 2. Continue established methods of pain control 3. VTE Prophylaxes - TEDS & SCDs 4. Encourage use of ICS 5. Remove Newsome once mobilizing with PT 
6. Remove drain once output is <80 
7. Discharge pending Signed By: Sedrick Hill MD

## 2018-10-27 NOTE — PROGRESS NOTES
Problem: Self Care Deficits Care Plan (Adult) Goal: *Acute Goals and Plan of Care (Insert Text) Occupational Therapy Goals Initiated 10/26/2018 1. Patient will perform lower body dressing with modified independence using AE PRN within 4 days. 2.  Patient will perform toileting with modified independence using most appropriate DME within 4 days. 3.  Patient will perform gathering ADL items high and low 2/2 at modified independence within 4 days. 4.  Patient will don/doff back brace at supervision/set-up within 4 days. Goal met 10/27/18 
5. Patient will verbalize/demonstrate 3/3 back precautions during ADL tasks without cues within 4 days. Occupational Therapy TREATMENT Patient: Lisa Chen (79 y.o. female) Date: 10/27/2018 Diagnosis: SPONDYLOLITHIASIS, STENOSIS, SCOLIOSIS Lumbar stenosis Spinal stenosis, lumbar <principal problem not specified> Procedure(s) (LRB): 
L3-S1 LUMBAR LAMINECTOMY WITH POSTERIOR LUMBAR FUSION L3-5 (N/A) 2 Days Post-Op Precautions: Back, Fall No bending, no lifting greater than 5 lbs, no twisting, log-roll technique, repositioning every 20-30 min except when sleeping, TLSO brace when OOB Chart, occupational therapy assessment, plan of care, and goals were reviewed. ASSESSMENT: 
Pt cleared for therapy by nursing and received supine in bed asleep but easily awoken and agreeable to goals of session. Pt able to perform bed mobility from flat bed and no rails simulating the home environment with supervision. Reported having elevated toilet seats at home but does get OOB frequently over night to use rest room and must go down 4 steps to get to the bathroom from her bedroom. At this time pt declined need for [facility] as she reports it is difficult for her to perform hygiene/void on a narrow BSC.   Discussed options for extra wide BSC (however increased cost a factor) and drop arm BSC; education provided on where to order but that this time pt declining. ABle to perform grooming/toileting/donning TLSO (using over the head method) at a SBA to supervision level with VCs for technique. Plan to discharge home 10/28; recommend following up with dressing/bathing. Progression toward goals: 
[x]          Improving appropriately and progressing toward goals 
[]          Improving slowly and progressing toward goals 
[]          Not making progress toward goals and plan of care will be adjusted PLAN: 
Patient continues to benefit from skilled intervention to address the above impairments. Continue treatment per established plan of care. Discharge Recommendations:  Home Health Further Equipment Recommendations for Discharge:  None at this time SUBJECTIVE:  
Patient stated I practiced this at home.  The patient stated 3/3 back precautions. Reviewed all 3 with patient. OBJECTIVE DATA SUMMARY:  
Cognitive/Behavioral Status: 
Neurologic State: Alert, Appropriate for age Orientation Level: Appropriate for age Cognition: Appropriate decision making, Appropriate for age attention/concentration, Appropriate safety awareness, Follows commands Safety/Judgement: Awareness of environment, Good awareness of safety precautionsFunctional Mobility and Transfers for ADLs:Bed Mobility: 
Supine to Sit: Stand-by assistance Transfers: 
Sit to Stand: Stand-by assistance Functional Transfers Bathroom Mobility: Stand-by assistance Toilet Transfer : Stand-by assistance Adaptive Equipment: Raffi Awan (comment) Balance: 
Sitting: Intact Standing: Intact; With support Standing - Static: Good Standing - Dynamic : Good ADL Intervention and Instruction: 
  
 
Grooming Grooming Assistance: Supervision/set up Washing Hands: Supervision/set-up(VCs for positioning) Dressing brace: Patient instructed and demonstrated to don/doff velcro on brace using dominant side, keeping non-dominant side intact.  Patient instructed and demonstrated in meantime of being able to stand with back against wall to don/doff brace, to don/doff seated using lap and bed/chair surface to support brace while manipulating. Dressing lower body: Patient instructed to don brace first and on the benefits to remain seated to don all clothing to increase independence with precautions and pain management. Toileting: Patient instructed on the benefits of using flushable wet wipes and toilet tongs if decreased reach or pain for amy care. Also, the benefits of a reacher to aid in clothing management. Standing: Patient instructed and indicated understanding to walk up to sink/counter top/surfaces, step into walker, square off while using objects, slide objects along surfaces, to increase adherence to back precautions and fall prevention. Patient instructed to increase amount of time standing in order to increase independence and tolerance with ADLs. During prolonged standing, can open cabinet door or place foot on stool to decrease spinal pressure/increase pain. Pain: 
  
Pain Intensity 1: 6 Activity Tolerance:  
No s/s of distress;  Rates pain at a 3/10 Please refer to the flowsheet for vital signs taken during this treatment. After treatment:  
[x] Patient left in no apparent distress sitting up in chair 
[] Patient left in no apparent distress in bed 
[x] Call bell left within reach [x] Nursing notified 
[] Caregiver present 
[] Bed alarm activated COMMUNICATION/COLLABORATION:  
The patients plan of care was discussed with: Physical Therapist and Registered Nurse Radha Parikh OT Time Calculation: 34 mins

## 2018-10-28 VITALS
TEMPERATURE: 98.3 F | HEART RATE: 78 BPM | BODY MASS INDEX: 38.6 KG/M2 | WEIGHT: 224.87 LBS | SYSTOLIC BLOOD PRESSURE: 107 MMHG | RESPIRATION RATE: 14 BRPM | OXYGEN SATURATION: 98 % | DIASTOLIC BLOOD PRESSURE: 63 MMHG

## 2018-10-28 PROCEDURE — 74011000250 HC RX REV CODE- 250: Performed by: PHYSICIAN ASSISTANT

## 2018-10-28 PROCEDURE — 74011250637 HC RX REV CODE- 250/637: Performed by: PHYSICIAN ASSISTANT

## 2018-10-28 RX ADMIN — ACETAMINOPHEN 1000 MG: 500 TABLET ORAL at 06:44

## 2018-10-28 RX ADMIN — POLYETHYLENE GLYCOL 3350 17 G: 17 POWDER, FOR SOLUTION ORAL at 08:28

## 2018-10-28 RX ADMIN — SENNOSIDES AND DOCUSATE SODIUM 1 TABLET: 8.6; 5 TABLET ORAL at 08:28

## 2018-10-28 RX ADMIN — Medication 10 ML: at 06:46

## 2018-10-28 RX ADMIN — ACETAMINOPHEN 1000 MG: 500 TABLET ORAL at 14:09

## 2018-10-28 NOTE — PROGRESS NOTES
Orthopedic Spine Progress Note Post Op day: 2 Day Post-Op 2018 7:50 AM  
Admit Date: 10/25/2018 Procedure: Procedure(s): 
L3-S1 LUMBAR LAMINECTOMY WITH POSTERIOR LUMBAR FUSION L3-5 Subjective:  
 
Luh Wolff appears well. Pain seems to be managed. No N/V Pain Control:  
Pain Assessment Pain Scale 1: Numeric (0 - 10) Pain Intensity 1: 2 Pain Onset 1: Post-Op Pain Location 1: Incisional, Back Pain Orientation 1: Lower Pain Description 1: Sore Pain Intervention(s) 1: Declines, Rest 
 
Objective:  
 
 
  
Physical Exam: 
General:  Alert and oriented. No acute distress. Heart:  Respirations unlabored. Abdomen:  
Extremities: Soft, non-tender. No evidence of cyanosis. Pulses palpable in both upper and lower extremities. Neurologic: 
Musculoskeletal:  No new motor deficits. Neurovascular exam within normal limits. Sensation stable. Motor: unchanged C5-T1 and L2-S1. Carolina's sign negative in bilateral lower extremities. Calves soft, nontender upon palpation and with passive twitch. Moves both upper and lower extremities. Incision: clean, dry, and intact. No significant erythema or swelling. No active drainage noted. Vital Signs:   
Blood pressure 107/63, pulse 78, temperature 98.3 °F (36.8 °C), resp. rate 14, weight 102 kg (224 lb 13.9 oz), SpO2 98 %. Temp (24hrs), Av.7 °F (37.1 °C), Min:98.1 °F (36.7 °C), Max:99.6 °F (37.6 °C) LAB:   
Recent Labs 10/27/18 
0254 HGB 8.2* Lab Results Component Value Date/Time Sodium 138 10/26/2018 02:53 AM  
 Potassium 4.2 10/26/2018 02:53 AM  
 Chloride 112 (H) 10/26/2018 02:53 AM  
 CO2 22 10/26/2018 02:53 AM  
 Glucose 117 (H) 10/26/2018 02:53 AM  
 BUN 18 10/26/2018 02:53 AM  
 Creatinine 0.70 10/26/2018 02:53 AM  
 Calcium 7.5 (L) 10/26/2018 02:53 AM  
 
 
Intake/Output:No intake/output data recorded. 10/26 1901 - 10/28 0700 In: -  
Out: 1660 [Urine:1250; Drains:410] Assessment: Patient is 2 Day Post-Op s/p Procedure(s): 
L3-S1 LUMBAR LAMINECTOMY WITH POSTERIOR LUMBAR FUSION L3-5 Obese - BMI 38.4 (224lbs, 5'4\") Plan: 1. PT/OT 2. Continue established methods of pain control 3. VTE Prophylaxes - TEDS & SCDs 4. Encourage use of ICS 5. Remove Newsome once mobilizing with PT 
6. Dc today Signed By: Gail Campbell MD

## 2018-10-29 ENCOUNTER — HOSPITAL ENCOUNTER (OUTPATIENT)
Age: 68
Setting detail: OBSERVATION
Discharge: HOME HEALTH | End: 2018-10-31
Attending: EMERGENCY MEDICINE | Admitting: ORTHOPAEDIC SURGERY
Payer: COMMERCIAL

## 2018-10-29 DIAGNOSIS — R26.2 INABILITY TO WALK: ICD-10-CM

## 2018-10-29 DIAGNOSIS — M48.061 SPINAL STENOSIS OF LUMBAR REGION WITHOUT NEUROGENIC CLAUDICATION: ICD-10-CM

## 2018-10-29 DIAGNOSIS — G89.18 POST-OP PAIN: Primary | ICD-10-CM

## 2018-10-29 PROBLEM — Z98.1 S/P LUMBAR SPINAL FUSION: Status: ACTIVE | Noted: 2018-10-29

## 2018-10-29 LAB
ALBUMIN SERPL-MCNC: 2.8 G/DL (ref 3.5–5)
ALBUMIN/GLOB SERPL: 0.7 {RATIO} (ref 1.1–2.2)
ALP SERPL-CCNC: 91 U/L (ref 45–117)
ALT SERPL-CCNC: 19 U/L (ref 12–78)
ANION GAP SERPL CALC-SCNC: 9 MMOL/L (ref 5–15)
AST SERPL-CCNC: 18 U/L (ref 15–37)
BASOPHILS # BLD: 0 K/UL (ref 0–0.1)
BASOPHILS NFR BLD: 0 % (ref 0–1)
BILIRUB SERPL-MCNC: 0.4 MG/DL (ref 0.2–1)
BUN SERPL-MCNC: 12 MG/DL (ref 6–20)
BUN/CREAT SERPL: 19 (ref 12–20)
CALCIUM SERPL-MCNC: 8.3 MG/DL (ref 8.5–10.1)
CHLORIDE SERPL-SCNC: 107 MMOL/L (ref 97–108)
CO2 SERPL-SCNC: 25 MMOL/L (ref 21–32)
CREAT SERPL-MCNC: 0.62 MG/DL (ref 0.55–1.02)
DIFFERENTIAL METHOD BLD: ABNORMAL
EOSINOPHIL # BLD: 0.1 K/UL (ref 0–0.4)
EOSINOPHIL NFR BLD: 1 % (ref 0–7)
ERYTHROCYTE [DISTWIDTH] IN BLOOD BY AUTOMATED COUNT: 12.6 % (ref 11.5–14.5)
GLOBULIN SER CALC-MCNC: 3.9 G/DL (ref 2–4)
GLUCOSE SERPL-MCNC: 84 MG/DL (ref 65–100)
HCT VFR BLD AUTO: 31.2 % (ref 35–47)
HGB BLD-MCNC: 9.9 G/DL (ref 11.5–16)
IMM GRANULOCYTES # BLD: 0 K/UL (ref 0–0.04)
IMM GRANULOCYTES NFR BLD AUTO: 0 % (ref 0–0.5)
LYMPHOCYTES # BLD: 2.3 K/UL (ref 0.8–3.5)
LYMPHOCYTES NFR BLD: 23 % (ref 12–49)
MCH RBC QN AUTO: 30.7 PG (ref 26–34)
MCHC RBC AUTO-ENTMCNC: 31.7 G/DL (ref 30–36.5)
MCV RBC AUTO: 96.6 FL (ref 80–99)
MONOCYTES # BLD: 1 K/UL (ref 0–1)
MONOCYTES NFR BLD: 9 % (ref 5–13)
NEUTS SEG # BLD: 6.7 K/UL (ref 1.8–8)
NEUTS SEG NFR BLD: 66 % (ref 32–75)
NRBC # BLD: 0 K/UL (ref 0–0.01)
NRBC BLD-RTO: 0 PER 100 WBC
PLATELET # BLD AUTO: 286 K/UL (ref 150–400)
PMV BLD AUTO: 10.3 FL (ref 8.9–12.9)
POTASSIUM SERPL-SCNC: 4 MMOL/L (ref 3.5–5.1)
PROT SERPL-MCNC: 6.7 G/DL (ref 6.4–8.2)
RBC # BLD AUTO: 3.23 M/UL (ref 3.8–5.2)
SODIUM SERPL-SCNC: 141 MMOL/L (ref 136–145)
WBC # BLD AUTO: 10.1 K/UL (ref 3.6–11)

## 2018-10-29 PROCEDURE — 74011250637 HC RX REV CODE- 250/637: Performed by: PHYSICIAN ASSISTANT

## 2018-10-29 PROCEDURE — 99218 HC RM OBSERVATION: CPT

## 2018-10-29 PROCEDURE — 80053 COMPREHEN METABOLIC PANEL: CPT | Performed by: EMERGENCY MEDICINE

## 2018-10-29 PROCEDURE — 99284 EMERGENCY DEPT VISIT MOD MDM: CPT

## 2018-10-29 PROCEDURE — 85025 COMPLETE CBC W/AUTO DIFF WBC: CPT | Performed by: EMERGENCY MEDICINE

## 2018-10-29 PROCEDURE — 36415 COLL VENOUS BLD VENIPUNCTURE: CPT | Performed by: EMERGENCY MEDICINE

## 2018-10-29 RX ORDER — HYDROCODONE BITARTRATE AND ACETAMINOPHEN 5; 325 MG/1; MG/1
1 TABLET ORAL
Status: DISCONTINUED | OUTPATIENT
Start: 2018-10-29 | End: 2018-10-31 | Stop reason: HOSPADM

## 2018-10-29 RX ORDER — FENTANYL CITRATE 50 UG/ML
12.5 INJECTION, SOLUTION INTRAMUSCULAR; INTRAVENOUS
Status: DISCONTINUED | OUTPATIENT
Start: 2018-10-29 | End: 2018-10-31 | Stop reason: HOSPADM

## 2018-10-29 RX ORDER — ONDANSETRON 4 MG/1
4 TABLET, ORALLY DISINTEGRATING ORAL
Status: DISCONTINUED | OUTPATIENT
Start: 2018-10-29 | End: 2018-10-31 | Stop reason: HOSPADM

## 2018-10-29 RX ORDER — SODIUM CHLORIDE 0.9 % (FLUSH) 0.9 %
5-10 SYRINGE (ML) INJECTION EVERY 8 HOURS
Status: DISCONTINUED | OUTPATIENT
Start: 2018-10-29 | End: 2018-10-31 | Stop reason: HOSPADM

## 2018-10-29 RX ORDER — ACETAMINOPHEN 500 MG
1000 TABLET ORAL
COMMUNITY
End: 2018-10-31

## 2018-10-29 RX ORDER — NALOXONE HYDROCHLORIDE 0.4 MG/ML
0.4 INJECTION, SOLUTION INTRAMUSCULAR; INTRAVENOUS; SUBCUTANEOUS AS NEEDED
Status: DISCONTINUED | OUTPATIENT
Start: 2018-10-29 | End: 2018-10-31 | Stop reason: HOSPADM

## 2018-10-29 RX ORDER — SODIUM CHLORIDE 0.9 % (FLUSH) 0.9 %
5-10 SYRINGE (ML) INJECTION AS NEEDED
Status: DISCONTINUED | OUTPATIENT
Start: 2018-10-29 | End: 2018-10-31 | Stop reason: HOSPADM

## 2018-10-29 RX ORDER — ACETAMINOPHEN 325 MG/1
650 TABLET ORAL
Status: DISCONTINUED | OUTPATIENT
Start: 2018-10-29 | End: 2018-10-31 | Stop reason: HOSPADM

## 2018-10-29 RX ORDER — DIPHENHYDRAMINE HCL 25 MG
25 CAPSULE ORAL
Status: DISCONTINUED | OUTPATIENT
Start: 2018-10-29 | End: 2018-10-31 | Stop reason: HOSPADM

## 2018-10-29 RX ORDER — BISACODYL 5 MG
5 TABLET, DELAYED RELEASE (ENTERIC COATED) ORAL DAILY PRN
Status: DISCONTINUED | OUTPATIENT
Start: 2018-10-29 | End: 2018-10-31 | Stop reason: HOSPADM

## 2018-10-29 RX ADMIN — Medication 10 ML: at 21:20

## 2018-10-29 RX ADMIN — HYDROCODONE BITARTRATE AND ACETAMINOPHEN 1 TABLET: 5; 325 TABLET ORAL at 21:19

## 2018-10-29 NOTE — PROGRESS NOTES
Admission Medication Reconciliation: 
 
Information obtained from: Pt 
 
Comments/Recommendations:  
 
Spoke with pt regarding patient's medications and updated last doses. Pt states she has been taking 2 extra strength APAP tabs TID for her hip pain and has had 4 tabs today. The following changes were made to the PTA medication list: 
Medications added: APAP 500mg tabs Medications removed: Norco 5-325mg Medications changed: Docusate changed from 200mg QHS to 100mg TID Allergies and reactions were verified with the patient. No other medication or allergy updates at this time. Patient's pharmacy: Pt uses a mail-order pharmacy Significant PMH/Disease States:  
Past Medical History:  
Diagnosis Date  Achilles tendon tear  Osteoarthritis  Varicose vein of leg Chief Complaint for this Admission: Chief Complaint Patient presents with  
 Hip Pain Allergies:  Oxycodone and Tramadol Prior to Admission Medications:  
Prior to Admission Medications Prescriptions Last Dose Informant Patient Reported? Taking?  
acetaminophen (TYLENOL EXTRA STRENGTH) 500 mg tablet 10/29/2018 at 1300  Yes Yes Sig: Take 1,000 mg by mouth three (3) times daily as needed for Pain. docusate sodium (COLACE) 100 mg capsule 10/29/2018 at 1300 Self Yes Yes Sig: Take 100 mg by mouth three (3) times daily as needed. Facility-Administered Medications: None Emily Jackson, PharmD Candidate [id number]

## 2018-10-29 NOTE — ED PROVIDER NOTES
80 y.o. female with past medical history significant for scoliosis, osteoarthritis, status post recent lumbar spinal surgery, presents via EMS with chief complaint of bilateral hip pain, left greater than right, since yesterday evening. Patient had a lumbar laminectomy with posterior lumbar fusion and transformainal interbody fusion on 10/25/18 by Dr. Glen Mac. She was discharged from the hospital yesterday evening. Patient reports that last night after getting home, she felt the onset of bilateral hip pain. Denies any new fall or injury. She notes the pain is located in the posterior aspect of both hips; the left hip is more painful than the right. Patient has been taking Tylenol 1000 mg TID without significant relief; she reports that she has an opiate prescription, but does not want to take strong pain medications. Today patient attempted to go to the kitchen to get something to eat, but notes that she was \"dragging\" her feet because of the hip pain. Overall patient has been having difficulty moving and getting around. Home health came to her house today and recommended a skilled nursing facility since patient lives at home alone. However patient's insurance is unable to get authorization for a SNF until tomorrow so she was sent to the ED. Patient rates her pain in both hips as an 8/10 in severity. She denies having any lower back pain or pain around the incision site. Patient has not had a BM since her surgery was performed, but has been urinating regularly. She specifically denies shortness of breath, fevers, abdominal pain, vomiting, numbness, or tingling. There are no other acute medical concerns at this time. Social hx: Denies current Tobacco use (former smoker); Positive EtOH use (rare); Denies Illicit Drug Abuse. Patient lives at home alone. PCP: MD Noelle Monroe Do. Brittany Alvarado MD ([address]) Note written by Nixon Engel, as dictated by David Shankar MD 5:21 PM  
 
 
The history is provided by the patient, medical records and a caregiver. No  was used. Past Medical History:  
Diagnosis Date  Achilles tendon tear  Osteoarthritis  Varicose vein of leg Past Surgical History:  
Procedure Laterality Date R Tc Esparzaro 8  HX BREAST BIOPSY FATTY LUMP - BENIGN  
 HX CYSTOCELE REPAIR    
 HX HYSTERECTOMY  HX KNEE ARTHROSCOPY Left MENISCUS REPAIR  
 HX ORTHOPAEDIC    
 STEM CELL INJECTIONS (21) FOR LT. ANKLE WOUND  HX ORTHOPAEDIC Left ACHILLES TENDON REPAIR, DIDN'T WORK  
 HX RECTOCELE REPAIR    
 HX UROLOGICAL CYSTOCELE, RECTOCELE REPAIR  
 HX WRIST FRACTURE TX Right Family History:  
Problem Relation Age of Onset  Hypertension Mother  Stroke Mother  Cancer Father ? BRAIN  
 Anesth Problems Neg Hx Social History Socioeconomic History  Marital status: SINGLE Spouse name: Not on file  Number of children: Not on file  Years of education: Not on file  Highest education level: Not on file Social Needs  Financial resource strain: Not on file  Food insecurity - worry: Not on file  Food insecurity - inability: Not on file  Transportation needs - medical: Not on file  Transportation needs - non-medical: Not on file Occupational History  Not on file Tobacco Use  Smoking status: Former Smoker Packs/day: 0.25 Years: 45.00 Pack years: 11.25 Last attempt to quit: 3/10/2018 Years since quittin.6  Smokeless tobacco: Never Used  Tobacco comment: 6 CIGARETTES A DAY WHEN SMOKED Substance and Sexual Activity  Alcohol use: Yes Comment: rare  Drug use: No  
 Sexual activity: Not on file Other Topics Concern  Not on file Social History Narrative  Not on file ALLERGIES: Oxycodone and Tramadol Review of Systems Constitutional: Positive for activity change and appetite change. Negative for fever. HENT: Negative for ear pain, facial swelling, sore throat and trouble swallowing. Eyes: Negative for pain, discharge and visual disturbance. Respiratory: Negative for chest tightness, shortness of breath and wheezing. Cardiovascular: Negative for chest pain and palpitations. Gastrointestinal: Negative for abdominal pain, blood in stool, nausea and vomiting. Genitourinary: Negative for difficulty urinating, flank pain and hematuria. Musculoskeletal: Positive for arthralgias (B/L hips, left > right). Negative for joint swelling, myalgias and neck pain. Skin: Negative for color change and rash. Neurological: Negative for dizziness, weakness, numbness and headaches. Hematological: Negative for adenopathy. Does not bruise/bleed easily. Psychiatric/Behavioral: Negative for behavioral problems, confusion and sleep disturbance. All other systems reviewed and are negative. Vitals:  
 10/29/18 1640 10/29/18 1700 10/29/18 1730 10/29/18 1800 BP: 123/45 110/42 109/40 116/53 Temp: 98.5 °F (36.9 °C) SpO2:  99% 99% 100% Weight: 99.3 kg (219 lb) Physical Exam  
Constitutional: She is oriented to person, place, and time. She appears well-developed and well-nourished. No distress. HENT:  
Head: Normocephalic and atraumatic. Nose: Nose normal.  
Mouth/Throat: Oropharynx is clear and moist. Mucous membranes are not dry. Eyes: Conjunctivae and EOM are normal. Pupils are equal, round, and reactive to light. No scleral icterus. Neck: Normal range of motion. Neck supple. No JVD present. No tracheal deviation present. No thyromegaly present. No carotid bruits noted. Cardiovascular: Normal rate, regular rhythm, normal heart sounds and intact distal pulses. Exam reveals no gallop and no friction rub. No murmur heard. Pulmonary/Chest: Effort normal and breath sounds normal. No respiratory distress. She has no wheezes. She has no rales. She exhibits no tenderness. Abdominal: Soft. Bowel sounds are normal. She exhibits no distension and no mass. There is no tenderness. There is no rebound and no guarding. Musculoskeletal: She exhibits no edema. There is a back brace in place. Tenderness behind the left hip. Brace removed and wound inspected. There is no erythema, wound drainage or dehiscence. Wound looks good Lymphadenopathy:  
  She has no cervical adenopathy. Neurological: She is alert and oriented to person, place, and time. She has normal reflexes. No cranial nerve deficit. Coordination normal.  
Sensation intact in the BLE Skin: Skin is warm and dry. No rash noted. No erythema. Psychiatric: She has a normal mood and affect. Her behavior is normal. Judgment and thought content normal.  
Nursing note and vitals reviewed. Note written by Jaimie Wood. Oralee Pitcher, as dictated by Joshua Schumacher MD 5:21 PM   
 
MDM Number of Diagnoses or Management Options Inability to walk: new and requires workup Post-op pain: new and requires workup Amount and/or Complexity of Data Reviewed Clinical lab tests: ordered and reviewed Tests in the radiology section of CPT®: ordered and reviewed Decide to obtain previous medical records or to obtain history from someone other than the patient: yes Review and summarize past medical records: yes Discuss the patient with other providers: yes Independent visualization of images, tracings, or specimens: yes Risk of Complications, Morbidity, and/or Mortality Presenting problems: high Diagnostic procedures: moderate Management options: moderate Patient Progress Patient progress: stable Procedures CONSULT NOTE: 
6:25 PM Joshua Schumacher MD spoke with ortho PA (BILL Grant). Discussed available diagnostic tests and clinical findings.   PA will evaluate the patient in the ED. Still awaiting Tiger Text response from orthopedic surgeon on call. PROGRESS NOTE: 
6:59 PM 
Ortho PA in the ED to evaluate the patient. Spoke with Dr. Ryan Gant, who will admit the patient to the hospital and work on disposition.

## 2018-10-29 NOTE — ED NOTES
Patient Advocacy notified of pt c/o \"being sent home with no discharge paperwork\". Pt also has 22g IV still in place from previous admission. ED charge nurse notified. RN looked through chart for discharge notes. No discharge nursing notes located. Pt advocacy will visit pt in ED.

## 2018-10-29 NOTE — ED NOTES
Pt advocacy in room with pt. Will escalate situation to nursing management. [facility], no nursing notes located in chart.

## 2018-10-29 NOTE — ED TRIAGE NOTES
Pt arrives via EMS from home c/o bilateral hip pain post operatively. Pt had Lumbar fusion L3,4,5 last Thursday. Pt states that home health PT  Came to her home today for first appointment and called 911 due to pt inability to perform ADLs and bilateral hip pain. Pt also states she \"wasn't eating\". Pt arrives AOx4, in no apparent distress, ambulated to ED stretcher and has IV from previous discharge left in her arm.

## 2018-10-29 NOTE — PROGRESS NOTES
Care Management -  
 
14:44 Received call from [facility] with [facility]. She said that the physical therapist Lyndon Amin was at the home with the patient and patient was essentially bed bound at this point and therapist recommended rehab. Patient is interested in going to [facility]. Reviewed notes from previous inpatient admission (10-25 to 10-28-18). Patient had a Lumbar laminectomy L3-L4, L4-L5, L5-S1, posterior lumbar fusion L3-L4, L4-L5, transforaminal interbody fusion, L3-L4, L4-L5, posterior segmental instrumentation, L3-S1 on 10-25-18. Patient walked 135 feet and was able to go up and down 4 steps while working with therapy. CM-Peg Cortes sent referral to [facility] via [address] [place] [address] from previous admission (10-25 to 10-28-18). She spoke with Rylan Erickson at [facility]. They accepted the patient, but will need to obtain authorization for admission. They have initiated the authorization, but will not have an answer until tomorrow. 16:57 This CM called Lyndon Amin PT with [facility] to update her on the above. She said that patient was now in the ED. 18:00 Met with patient in her ED room. Patient has entrance steps to get into her home, but inside is one level. Patient stated she did well with therapy while she was here. She took pain medicine before she worked with  therapy. Per chart review, the pain medicine was tylenol. Patient said she kept telling them that she did not want to take any \"opiate medicine\". Patient said she has friends who can help her a little bit, but they live in [place]. That is the reason she is interested in [facility] as her preference for rehab. Explained that Bibi is waiting to hear from insurance company regarding authorization. Insurance: 1-[employer], 2-Medicare Reason for Readmission:     Pain level and inability to mobilize. RRAT Score and Risk Level:     5, low Level of Readmission:    Level 3 Care Conference scheduled:   no 
    
Resources/supports as identified by patient/family:   Patient has 2 friends that live in [address] [place] [address] that Top Challenges facing patient (as identified by patient/family and CM): Finances/Medication cost?      
Transportation      Patient or friend. Support system or lack thereof?     friends Living arrangements? Patient lives alone. She drives and works full time. Self-care/ADLs/Cognition? Independent prior to last admission. Current Advanced Directive/Advance Care Plan:  Not on file Plan for utilizing home health:    
          
Likelihood of additional readmission:   Low Transition of Care Plan:    Based on readmission, the patient's previous Plan of Care  has been evaluated and/or modified. Previous Plan: Patient was discharged home with [facility] on 10-28-18. The current Transition of Care Plan is: Discharge to [facility] in admissions at [facility] obtains authorization. Care Management Interventions PCP Verified by CM: Yes(Dr. Kae Gallego) Last Visit to PCP: 10/15/18 Transition of Care Consult (CM Consult): SNF Partner SNF: Yes MyChart Signup: No 
Discharge Durable Medical Equipment: No 
Speech Therapy Consult: No 
Current Support Network: Lives Alone Confirm Follow Up Transport: Self(friends or self) Plan discussed with Pt/Family/Caregiver: Yes Freedom of Choice Offered: Yes [employer] Information Provided?: No 
Discharge Location Discharge Placement: Skilled nursing facility([facility]) [facility]

## 2018-10-29 NOTE — CONSULTS
ORTHO CONSULT NOTE    Date of Consultation:  2018  Referring Physician:  Rand Hollis MD ER  CC: hip pain    HPI:  Sukhdeep Umaña is a 80 y.o. female is 4 days s/p L3-S1 LUMBAR LAMINECTOMY WITH POSTERIOR LUMBAR FUSION L3-5 who was discharged from [address] [place] [address] yesterday. She lives alone and found today very difficult for basics of getting to bathroom and preparing food. She c/o bilat buttocks pain L > R that was present in hospital but seems worse at home. She attempted Tylenol and is reluctant to consider narcotics bc they tend to cause constipation. She denies fall, fever, wound problems, leg numbness, radicular pain beyond buttocks, leg weakness, N/V, CP and SOB. She has her brace with her. She is otherwise healthy and denies daily meds pre-op. Patient Active Problem List    Diagnosis Date Noted    Lumbar stenosis 10/25/2018    Spinal stenosis, lumbar 10/25/2018    Achilles tendon tear 2015     Family History   Problem Relation Age of Onset    Hypertension Mother     Stroke Mother     Cancer Father         ?  BRAIN    Anesth Problems Neg Hx       Social History     Tobacco Use    Smoking status: Former Smoker     Packs/day: 0.25     Years: 45.00     Pack years: 11.25     Last attempt to quit: 3/10/2018     Years since quittin.6    Smokeless tobacco: Never Used    Tobacco comment: 6 CIGARETTES A DAY WHEN SMOKED   Substance Use Topics    Alcohol use: Yes     Comment: rare     Past Medical History:   Diagnosis Date    Achilles tendon tear     Osteoarthritis     Varicose vein of leg       Past Surgical History:   Procedure Laterality Date    HX APPENDECTOMY      HX BREAST BIOPSY      FATTY LUMP - BENIGN    HX CYSTOCELE REPAIR      HX HYSTERECTOMY      HX KNEE ARTHROSCOPY Left     MENISCUS REPAIR    HX ORTHOPAEDIC      STEM CELL INJECTIONS (21) FOR LT. ANKLE WOUND    HX ORTHOPAEDIC Left     ACHILLES TENDON REPAIR, DIDN'T WORK    HX RECTOCELE REPAIR      HX UROLOGICAL CYSTOCELE, RECTOCELE REPAIR    HX WRIST FRACTURE TX Right       Prior to Admission medications    Medication Sig Start Date End Date Taking? Authorizing Provider   HYDROcodone-acetaminophen (NORCO) 5-325 mg per tablet Take 1 Tab by mouth every four (4) hours as needed. Max Daily Amount: 6 Tabs. 10/26/18   Jane Barlow PA-C   docusate sodium (COLACE) 100 mg capsule Take 200 mg by mouth nightly. Provider, Historical     No current facility-administered medications for this encounter. Current Outpatient Medications   Medication Sig    HYDROcodone-acetaminophen (NORCO) 5-325 mg per tablet Take 1 Tab by mouth every four (4) hours as needed. Max Daily Amount: 6 Tabs.  docusate sodium (COLACE) 100 mg capsule Take 200 mg by mouth nightly. Allergies   Allergen Reactions    Oxycodone Other (comments)     \"makes me constipated\"    Tramadol Hives        Review of Systems:  Per HPI. Objective:     Patient Vitals for the past 8 hrs:   BP Temp SpO2 Weight   10/29/18 1800 116/53  100 %    10/29/18 1730 109/40  99 %    10/29/18 1700 110/42  99 %    10/29/18 1640 123/45 98.5 °F (36.9 °C)  99.3 kg (219 lb)     Temp (24hrs), Av.5 °F (36.9 °C), Min:98.5 °F (36.9 °C), Max:98.5 °F (36.9 °C)      EXAM:   NAD. Lying on ER stretcher. Dressing CDI. Incision without erythema, dehiscence. Moves bilat UE well. Strength bilat LE 5/5 for resisted hip/knee/ankle motion. SILT bilat LE. Calf soft, NT bilat. Imaging Review:   Results from Hospital Encounter encounter on 10/25/18   XR SPINE LUMB SNGL V    Narrative EXAM:  XR SPINE LUMB SNGL V    INDICATION:  Intra-Op    COMPARISON: MRI 2018. FINDINGS: An intraoperative fluoroscopic view of the lumbar spine demonstrates  pedicle screws and vertical rods at 3 adjacent levels. Please refer to the  operative report for additional details. Impression IMPRESSION: Lumbar spine procedure in progress.     INTERPRETATION PROVIDED FOR COMPLIANCE ONLY AT NO CHARGE         No results found for this or any previous visit. Labs:   No results found for this or any previous visit (from the past 24 hour(s)). Impression:     Patient Active Problem List    Diagnosis Date Noted    Lumbar stenosis 10/25/2018    Spinal stenosis, lumbar 10/25/2018    Achilles tendon tear 05/01/2015     Active Problems:    * No active hospital problems. *    Difficulties with ADLs at home s/p lumbar decompression/fusion. Plan:   Discussed with Dr. Duong Lam, primary surgeon. Will admit for pain control, therapy, and placement. No evidence of infection or clot.       [facility] [place]

## 2018-10-30 ENCOUNTER — APPOINTMENT (OUTPATIENT)
Dept: GENERAL RADIOLOGY | Age: 68
End: 2018-10-30
Attending: PHYSICIAN ASSISTANT
Payer: COMMERCIAL

## 2018-10-30 LAB
ANION GAP SERPL CALC-SCNC: 11 MMOL/L (ref 5–15)
BUN SERPL-MCNC: 11 MG/DL (ref 6–20)
BUN/CREAT SERPL: 20 (ref 12–20)
CALCIUM SERPL-MCNC: 8.2 MG/DL (ref 8.5–10.1)
CHLORIDE SERPL-SCNC: 109 MMOL/L (ref 97–108)
CO2 SERPL-SCNC: 23 MMOL/L (ref 21–32)
CREAT SERPL-MCNC: 0.55 MG/DL (ref 0.55–1.02)
ERYTHROCYTE [DISTWIDTH] IN BLOOD BY AUTOMATED COUNT: 12.4 % (ref 11.5–14.5)
GLUCOSE SERPL-MCNC: 104 MG/DL (ref 65–100)
HCT VFR BLD AUTO: 28.5 % (ref 35–47)
HGB BLD-MCNC: 9.1 G/DL (ref 11.5–16)
MCH RBC QN AUTO: 30.3 PG (ref 26–34)
MCHC RBC AUTO-ENTMCNC: 31.9 G/DL (ref 30–36.5)
MCV RBC AUTO: 95 FL (ref 80–99)
NRBC # BLD: 0 K/UL (ref 0–0.01)
NRBC BLD-RTO: 0 PER 100 WBC
PLATELET # BLD AUTO: 268 K/UL (ref 150–400)
PMV BLD AUTO: 10.2 FL (ref 8.9–12.9)
POTASSIUM SERPL-SCNC: 4 MMOL/L (ref 3.5–5.1)
RBC # BLD AUTO: 3 M/UL (ref 3.8–5.2)
SODIUM SERPL-SCNC: 143 MMOL/L (ref 136–145)
WBC # BLD AUTO: 8.3 K/UL (ref 3.6–11)

## 2018-10-30 PROCEDURE — 36415 COLL VENOUS BLD VENIPUNCTURE: CPT | Performed by: PHYSICIAN ASSISTANT

## 2018-10-30 PROCEDURE — 80048 BASIC METABOLIC PNL TOTAL CA: CPT | Performed by: PHYSICIAN ASSISTANT

## 2018-10-30 PROCEDURE — G8978 MOBILITY CURRENT STATUS: HCPCS | Performed by: PHYSICAL THERAPIST

## 2018-10-30 PROCEDURE — 97116 GAIT TRAINING THERAPY: CPT | Performed by: PHYSICAL THERAPIST

## 2018-10-30 PROCEDURE — 99218 HC RM OBSERVATION: CPT

## 2018-10-30 PROCEDURE — 72100 X-RAY EXAM L-S SPINE 2/3 VWS: CPT

## 2018-10-30 PROCEDURE — 74011250637 HC RX REV CODE- 250/637: Performed by: PHYSICIAN ASSISTANT

## 2018-10-30 PROCEDURE — G8987 SELF CARE CURRENT STATUS: HCPCS

## 2018-10-30 PROCEDURE — 74011000250 HC RX REV CODE- 250: Performed by: NURSE PRACTITIONER

## 2018-10-30 PROCEDURE — G8979 MOBILITY GOAL STATUS: HCPCS | Performed by: PHYSICAL THERAPIST

## 2018-10-30 PROCEDURE — 85027 COMPLETE CBC AUTOMATED: CPT | Performed by: PHYSICIAN ASSISTANT

## 2018-10-30 PROCEDURE — 97161 PT EVAL LOW COMPLEX 20 MIN: CPT | Performed by: PHYSICAL THERAPIST

## 2018-10-30 PROCEDURE — G8988 SELF CARE GOAL STATUS: HCPCS

## 2018-10-30 PROCEDURE — 97535 SELF CARE MNGMENT TRAINING: CPT

## 2018-10-30 PROCEDURE — 97165 OT EVAL LOW COMPLEX 30 MIN: CPT

## 2018-10-30 RX ORDER — HYDROCODONE BITARTRATE AND ACETAMINOPHEN 5; 325 MG/1; MG/1
1-2 TABLET ORAL
Qty: 50 TAB | Refills: 0 | Status: SHIPPED | OUTPATIENT
Start: 2018-10-30 | End: 2018-10-30

## 2018-10-30 RX ORDER — LIDOCAINE 50 MG/G
2 PATCH TOPICAL EVERY 24 HOURS
Status: DISCONTINUED | OUTPATIENT
Start: 2018-10-30 | End: 2018-10-31 | Stop reason: HOSPADM

## 2018-10-30 RX ORDER — DOCUSATE SODIUM 100 MG/1
100 CAPSULE, LIQUID FILLED ORAL 2 TIMES DAILY
Status: DISCONTINUED | OUTPATIENT
Start: 2018-10-30 | End: 2018-10-31 | Stop reason: HOSPADM

## 2018-10-30 RX ORDER — ADHESIVE BANDAGE
30 BANDAGE TOPICAL ONCE
Status: ACTIVE | OUTPATIENT
Start: 2018-10-30 | End: 2018-10-30

## 2018-10-30 RX ORDER — DEXTROMETHORPHAN POLISTIREX 30 MG/5 ML
SUSPENSION, EXTENDED RELEASE 12 HR ORAL AS NEEDED
Status: DISCONTINUED | OUTPATIENT
Start: 2018-10-30 | End: 2018-10-31 | Stop reason: HOSPADM

## 2018-10-30 RX ORDER — HYDROCODONE BITARTRATE AND ACETAMINOPHEN 5; 325 MG/1; MG/1
1-2 TABLET ORAL
Qty: 50 TAB | Refills: 0 | Status: SHIPPED | OUTPATIENT
Start: 2018-10-30 | End: 2020-01-21

## 2018-10-30 RX ADMIN — BISACODYL 5 MG: 5 TABLET, COATED ORAL at 09:54

## 2018-10-30 RX ADMIN — DOCUSATE SODIUM 100 MG: 100 CAPSULE, LIQUID FILLED ORAL at 17:42

## 2018-10-30 RX ADMIN — HYDROCODONE BITARTRATE AND ACETAMINOPHEN 1 TABLET: 5; 325 TABLET ORAL at 14:00

## 2018-10-30 RX ADMIN — HYDROCODONE BITARTRATE AND ACETAMINOPHEN 1 TABLET: 5; 325 TABLET ORAL at 17:42

## 2018-10-30 RX ADMIN — Medication 10 ML: at 17:43

## 2018-10-30 RX ADMIN — HYDROCODONE BITARTRATE AND ACETAMINOPHEN 1 TABLET: 5; 325 TABLET ORAL at 09:33

## 2018-10-30 RX ADMIN — Medication 10 ML: at 22:10

## 2018-10-30 RX ADMIN — HYDROCODONE BITARTRATE AND ACETAMINOPHEN 1 TABLET: 5; 325 TABLET ORAL at 05:47

## 2018-10-30 RX ADMIN — HYDROCODONE BITARTRATE AND ACETAMINOPHEN 1 TABLET: 5; 325 TABLET ORAL at 22:10

## 2018-10-30 RX ADMIN — DOCUSATE SODIUM 100 MG: 100 CAPSULE, LIQUID FILLED ORAL at 09:54

## 2018-10-30 RX ADMIN — Medication 10 ML: at 05:47

## 2018-10-30 NOTE — PROGRESS NOTES
Orthopedic Spine Progress Note 2018 8:17 AM  
Admit Date: 10/29/2018 Procedure: Lumbar fusion L3-5 Subjective:  
 
Aubrey Rdz appears well. She complains of bilateral low back pain. She complains of constipation. She was discharged to home with home health on 10/28/18 and did not have good pain control at home. She was only taking Tylenol as she stated she did not go home with any pain medications. Scripts were available with discharge paperwork, but upon arriving home, patient could not find any of her paperwork. Home care nurse recommended she come to the ED for evaluation. She was admitted yesterday evening for intractable back pain. Afebrile/VSS. Tolerating diet No N/V 
Voiding Pain Control:  
Pain Assessment Pain Scale 1: Numeric (0 - 10) Pain Intensity 1: 9 Pain Location 1: Hip Pain Orientation 1: Left, Right Objective:  
 
 
  
Physical Exam: 
General:  Alert and oriented. No acute distress. Heart:  Respirations unlabored. Abdomen:  
Extremities: Soft, non-tender. No evidence of cyanosis. Pulses palpable in both upper and lower extremities. Neurologic: 
Musculoskeletal:  No new motor deficits. Neurovascular exam within normal limits. Sensation stable. Motor: unchanged C5-T1 and L2-S1. Carolina's sign negative in bilateral lower extremities. Calves soft, nontender upon palpation and with passive twitch. Moves both upper and lower extremities. Incision: clean, dry, and intact. No significant erythema or swelling. No active drainage noted. Vital Signs:   
Blood pressure 109/73, pulse 65, temperature 99 °F (37.2 °C), resp. rate 16, weight 99.3 kg (219 lb), SpO2 94 %. Temp (24hrs), Av.8 °F (37.1 °C), Min:98.5 °F (36.9 °C), Max:99 °F (37.2 °C) LAB:   
Recent Labs 10/30/18 
0252 HCT 28.5* HGB 9.1*  
 Lab Results Component Value Date/Time  Sodium 143 10/30/2018 02:52 AM  
 Potassium 4.0 10/30/2018 02:52 AM  
 Chloride 109 (H) 10/30/2018 02:52 AM  
 CO2 23 10/30/2018 02:52 AM  
 Glucose 104 (H) 10/30/2018 02:52 AM  
 BUN 11 10/30/2018 02:52 AM  
 Creatinine 0.55 10/30/2018 02:52 AM  
 Calcium 8.2 (L) 10/30/2018 02:52 AM  
 
 
Intake/Output:No intake/output data recorded. No intake/output data recorded. Assessment:  
 
Lumbar fusion L3-L5 10/25/18 Plan: 1. PT/OT - evaluate for SNF placement 2. Continue established methods of pain control 3. VTE Prophylaxes - TEDS & SCDs 4. Encourage use of ICS 5. Discharge to SNF vs home with home health today vs tomorrow Signed By: Mariano Byrd PA-C

## 2018-10-30 NOTE — PROGRESS NOTES
Cm met with patient along with the team, discussed the discharge plan. Patient had not worked with therapy this morning because she was on the phone with her insurance company for an extended period of time. Cm educated patient on the need to work with therapy. Patient verbalized understanding. Mayito spoke with patients'  with the insurance, faxed the release form to her Wandylizbet Burrows, [phone]).  
[employer] , [facility]

## 2018-10-30 NOTE — PROGRESS NOTES
Occupational Therapy Note 1017 Orders acknowledged, chart reviewed. Attempted to see patient for OT evaluation, however patient talking on phone, requesting therapy return after phone call completed. Will follow up later today as able & appropriate. Thank you Nicholas Ghotra OT

## 2018-10-30 NOTE — DISCHARGE INSTRUCTIONS
After Hospital Care Plan:  Discharge Instructions Lumbar Fusion Surgery   Dr. Mer Miller   Patient Name: Haydee Burciaga    Date of procedure: * No surgery found *  Date of discharge:     Procedure: * No surgery found *  PCP: Georgia Alanis MD    Follow up appointments  -follow up with Dr. Dr. Mer Miller in 2 weeks. Call [phone] to make an appointment as soon as you get home from the hospital.    [address] [place], [place] [address]y: ____________________   phone: _______________________  The agency will contact you to arrange dates/times for visits. Please call them if you do not hear from them within 24 hours after you are discharged  Physical therapy 3 times a week for 3 weeks  Nursing-initial assessment and as needed    When to call your Orthopaedic Surgeon:  -Signs of infection-if your incision is red; continues to have drainage; drainage has a foul odor or if you have a persistent fever over 101 degrees for 24 hours  -Nausea or vomiting, severe headache  -Loss of bowel or bladder function, inability to urinate  -Changes in sensation in your arms or legs (numbness, tingling, loss of color)  -Increased weakness-greater than before your surgery  -Severe pain or pain not relieved by medications  -Signs of a blood clot in your leg-calf pain, tenderness, redness, swelling of lower leg    When to call your Primary Care Physician:  -Concerns about medical conditions such as diabetes, high blood pressure, asthma, congestive heart failure  -Call if blood sugars are elevated, persistent headache or dizziness, coughing or congestion, constipation or diarrhea, burning with urination, abnormal heart rate    When to call 911 and go to the nearest emergency room:  -Acute onset of chest pain, shortness of breath, difficulty breathing    Activity  -You are going home a well person, be as active as possible. Your only exercise should be walking.   Start with short frequent walks and increase your walking distance each day.  -Limit the amount of time you sit to 20-30 minute intervals. Sitting for prolonged periods of time will be uncomfortable for you following surgery.  -Do NOT lift anything over 5 pounds  -Do NOT do any straining, twisting or bending  -When you are in bed, you may lay on your back or on either side. Do NOT lie on your stomach    Brace  -If you have a back brace, you should wear your brace at all times when you are out of bed. Do not wear the brace while in bed or showering.  -Remember to always wear a cotton t-shirt underneath your brace.  -Do not bend or twist when your brace is off    Diet  -Resume usual diet; drink plenty of fluids; eat foods high in fiber  -It is important to have regular bowel movements. Pain medications may cause constipation. You may want to take a stool softener (such as Senokot-S or Colace) to prevent constipation.   -If constipation occurs, take a laxative (such as Dulcolax tablets, Milk of Magnesia, or a suppository). Laxatives should only be used if the above preventable measures have failed and you still have not had a bowel movement after three days    Driving  -You may not drive or return to work until instructed by your physician. However, you may ride in the car for short periods of time. Incision Care  Your incision has been closed with absorbable sutures and the Zipline skin closure system. This will assist with healing. The Sherryn Starring is to remain on your incision for 2 weeks. A dry dressing (ABD and tape) will be placed over it and should be changed daily, for at least the first several days after your surgery. If you have no incisional drainage, you may leave the incision open to air if you wish, still leaving the Zipline in place. Please make sure to wash your hands prior to touching your dressing. You may take brief showers but do not run the water directly onto the wound. After your shower, blot your incision dry with a soft towel and replace the dry dressing.  Do not allow the tape to come in contact with the Zipline. Do not rub or apply any lotions or ointments to your incision site. Do not soak or scrub your wound. The Zipline dressing will be removed during your two week follow-up appointment. If you experience drainage leaking from underneath the Zipline or if it peels off before 2 weeks, please contact your orthopedic surgeons office. Showering  -You may shower in approximately 4 days after your surgery.    -Leave the dressing on during your shower. Do NOT allow the water to run directly onto your dressing. Once you get out of the shower, gently pat the dressing dry. -Reminder- your brace can be removed while showering. Remember to not bend or twist while your brace is off.    -Do not take a tub bath. Preventing blood clots  -You have been given T.E.D. stockings to wear. Continue to wear these for 7 days after your discharge. Put them on in the morning and take them off at night.    -They are used to increase your circulation and prevent blood clots from forming in your legs  -T. E.D. stockings can be machine washed, temperature not to exceed 160° F (71°C) and machine dried for 15 to 20 minutes, temperature not to exceed 250° F (121°C). Pain management  -Take pain medication as prescribed; decrease the amount you use as your pain lessens  -DO not wait until you are in extreme pain to take your medication.  -Avoid alcoholic beverages while taking pain medication    Pain Medication Safety  DO:  -Read the Medication Guide   -Take your medicine exactly as prescribed   -Store your medicine away from children and in a safe place   -Flush unused medicine down the toilet   -Call your healthcare provider for medical advice about side effects. You may report side effects to FDA at 0-738-FDA-0371.   -Please be aware that many medications contain Tylenol. We do not want you to over medicate so please read the information below as a guide.   Do not take more than 4 Grams of Tylenol in a 24 hour period. (There are 1000 milligrams in one Gram)                                                                                                                                                                                                                                                Percocet contains 325 mg of Tylenol per tablet (do not take more than 12 tablets in 24 hours)  Lortab contains 500 mg of Tylenol per tablet (do not take more than 8 tablets in 24 hours)  Norco contains 325 mg of Tylenol per tablet (do not take more than 12 tablets in 24 hours). DO NOT:  -Do not give your medicine to others   -Do not take medicine unless it was prescribed for you   -Do not stop taking your medicine without talking to your healthcare provider   -Do not break, chew, crush, dissolve, or inject your medicine. If you cannot swallow your medicine whole, talk to your healthcare provider.  -Do not drink alcohol while taking this medicine  -Do not take anti-inflammatory medications or aspirin unless instructed by your     physician.

## 2018-10-30 NOTE — PROGRESS NOTES
TRANSFER - IN REPORT: 
 
Verbal report received from Tomas Hernandez RN(name) on Aubrey Villatoro  being received from ED(unit) for routine progression of care Report consisted of patients Situation, Background, Assessment and  
Recommendations(SBAR). Information from the following report(s) SBAR, Kardex, ED Summary, MAR, Accordion and Recent Results was reviewed with the receiving nurse. Opportunity for questions and clarification was provided. Assessment completed upon patients arrival to unit and care assumed. Primary Nurse Chasity Chaudhari RN and RN, RN performed a dual skin assessment on this patient No impairment noted other than expected surgical incision Rex score is 23

## 2018-10-30 NOTE — PROGRESS NOTES
Problem: Mobility Impaired (Adult and Pediatric) Goal: *Acute Goals and Plan of Care (Insert Text) Physical Therapy Goals Initiated 10/30/2018 1. Patient will move from supine to sit and sit to supine  in bed with modified independence within 4 days. 2. Patient will perform sit to stand with modified independence within 4 days. 3. Patient will ambulate with modified independence for 250 feet with the least restrictive device within 4 days. 4. Patient will ascend/descend 4 stairs with 2 handrail(s) with modified independence within 4 days. 5. Patient will verbalize and demonstrate understanding of spinal precautions (No bending, lifting greater than 5 lbs, or twisting; log-roll technique; frequent repositioning as instructed) within 4 days. physical Therapy EVALUATION Patient: Narda Ambrose (79 y.o. female) Date: 10/30/2018 Primary Diagnosis: S/P lumbar spinal fusion Precautions:   Back, Fall, Other (comment)(brace when up) ASSESSMENT : 
Based on the objective data described below, the patient presents with decreased functional mobility from baseline level of function. Patient readmitted from back surgery approx 4 days ago as she was [place] to [employer]" at home. Patient lives alone in a 1 level home with approx 4 LLOYD with B rails. Has been using a RW at home and [facility].  Currently needing Jayce for bed mobility and CGA for transfers with extra time to complete task. Amb approx 80 feet with RW and CGA with slow traci and mild instability to L LE during gait without overt evidence of buckling. Pain limits mobility progression. Given patient's difficulty managing at home recommend rehab setting to continue mobility progression. Patient can tolerate 3 hours of therapy a day and was completely independent prior to admit. Recommend inpt rehab vs SNF rehab pending progress. Patient will benefit from skilled intervention to address the above impairments. Patients rehabilitation potential is considered to be Good Factors which may influence rehabilitation potential include:  
[x]         None noted 
[]         Mental ability/status []         Medical condition 
[]         Home/family situation and support systems 
[]         Safety awareness 
[]         Pain tolerance/management 
[]         Other: PLAN : 
Recommendations and Planned Interventions: 
[x]           Bed Mobility Training             [x]    Neuromuscular Re-Education 
[x]           Transfer Training                   []    Orthotic/Prosthetic Training 
[x]           Gait Training                         []    Modalities [x]           Therapeutic Exercises           []    Edema Management/Control 
[x]           Therapeutic Activities            [x]    Patient and Family Training/Education 
[]           Other (comment): Frequency/Duration: Patient will be followed by physical therapy  daily to address goals. Discharge Recommendations: Inpatient Rehab and [facility] Further Equipment Recommendations for Discharge: none SUBJECTIVE:  
Patient stated I want to go to [place] so I can be closer to the friends who sometimes help me.  OBJECTIVE DATA SUMMARY:  
HISTORY:   
Past Medical History:  
Diagnosis Date  Achilles tendon tear  Osteoarthritis  Varicose vein of leg Past Surgical History:  
Procedure Laterality Date [address]  HX BREAST BIOPSY FATTY LUMP - BENIGN  
 HX CYSTOCELE REPAIR    
 HX HYSTERECTOMY  HX KNEE ARTHROSCOPY Left MENISCUS REPAIR  
 HX ORTHOPAEDIC    
 STEM CELL INJECTIONS (21) FOR LT. ANKLE WOUND  HX ORTHOPAEDIC Left ACHILLES TENDON REPAIR, DIDN'T WORK  
 HX RECTOCELE REPAIR    
 HX UROLOGICAL CYSTOCELE, RECTOCELE REPAIR  
 HX WRIST FRACTURE TX Right Prior Level of Function/Home Situation: Independent with mobility at baseline.   Has most recently been ambulatory using a RW 
 Personal factors and/or comorbidities impacting plan of care:  
 
Home Situation Home Environment: Private residence # Steps to Enter: 4 Rails to Enter: Yes One/Two Story Residence: One story Living Alone: Yes Support Systems: Friends \ neighbors Patient Expects to be Discharged to[de-identified] Rehabilitation facility Current DME Used/Available at Home: Cane, straight, Raised toilet seat, Shower chair, Walker, rolling EXAMINATION/PRESENTATION/DECISION MAKING: Critical Behavior: 
Neurologic State: Alert Orientation Level: Oriented X4 Hearing: Auditory Auditory Impairment: NoneSkin:   
Edema:  
Range Of Motion: 
AROM: Generally decreased, functional 
  
  
  
  
  
  
  
Strength:   
Strength: Generally decreased, functional 
  
  
  
  
  
  
Tone & Sensation:  
  
  
  
  
  
  
  
  
  
   
Coordination: 
  
Vision:  
  
Functional Mobility: 
Bed Mobility: 
Rolling: Minimum assistance;Assist x1 Supine to Sit: Minimum assistance;Assist x1 Scooting: Contact guard assistance;Assist x1 Transfers: 
Sit to Stand: Contact guard assistance; Additional time;Assist x1 Stand to Sit: Contact guard assistance Balance:  
Sitting: Intact Standing: Impaired Standing - Static: Constant support;Good Standing - Dynamic : FairAmbulation/Gait Training:Distance (ft): 80 Feet (ft) Assistive Device: Brace/Splint;Gait belt;Walker, rolling Ambulation - Level of Assistance: Contact guard assistance; Additional time;Assist x1 Gait Description (WDL): Exceptions to [facility] Gait Abnormalities: Antalgic;Decreased step clearance;Shuffling gait Base of Support: Widened Speed/Leatha: Pace decreased (<100 feet/min); Slow Step Length: Left shortened;Right shortened Functional Measure: 
Barthel Index: 
 
Bathin Bladder: 10 Bowels: 10 
Groomin Dressin Feeding: 10 Mobility: 10 Stairs: 5 Toilet Use: 5 Transfer (Bed to Chair and Back): 10 Total: 70 
 
 
 Barthel and G-code impairment scale: 
Percentage of impairment CH 
0% CI 
1-19% CJ 
20-39% CK 
40-59% CL 
60-79% CM 
80-99% CN 
100% Barthel Score 0-100 100 99-80 79-60 59-40 20-39 1-19 
 0 Barthel Score 0-20 20 17-19 13-16 9-12 5-8 1-4 0 The Barthel ADL Index: Guidelines 1. The index should be used as a record of what a patient does, not as a record of what a patient could do. 2. The main aim is to establish degree of independence from any help, physical or verbal, however minor and for whatever reason. 3. The need for supervision renders the patient not independent. 4. A patient's performance should be established using the best available evidence. Asking the patient, friends/relatives and nurses are the usual sources, but direct observation and common sense are also important. However direct testing is not needed. 5. Usually the patient's performance over the preceding 24-48 hours is important, but occasionally longer periods will be relevant. 6. Middle categories imply that the patient supplies over 50 per cent of the effort. 7. Use of aids to be independent is allowed. Gonzalez Feliciano., Barthel, DMagaliW. (2529). Functional evaluation: the Barthel Index. [address] [facility] (14)2. JIMBO Nova, Alvaro Mir., Jillian Angeles., Cj, [address] [place] [address] (1999). Measuring the change indisability after inpatient rehabilitation; comparison of the responsiveness of the Barthel Index and Functional [place] Measure. Journal of Neurology, Neurosurgery, and Psychiatry, 66(4), 582-679. Cheyenne Burgess, N.J.A, JASON Villegas, & Jimmy Chairez, MMagaliA. (2004.) Assessment of post-stroke quality of life in cost-effectiveness studies: The usefulness of the Barthel Index and the EuroQoL-5D. [facility], 13, 366-13 G codes: In compliance with CMSs Claims Based Outcome Reporting, the following G-code set was chosen for this patient based on their primary functional limitation being treated: The outcome measure chosen to determine the severity of the functional limitation was the Barthel with a score of 70/100 which was correlated with the impairment scale. ? Mobility - Walking and Moving Around:  
  - CURRENT STATUS: CJ - 20%-39% impaired, limited or restricted  - GOAL STATUS: CI - 1%-19% impaired, limited or restricted  - D/C STATUS:  ---------------To be determined---------------  
 
 
Pain: 
Pain Scale 1: Numeric (0 - 10) Pain Intensity 1: 6 Pain Location 1: Back;Leg 
Pain Orientation 1: Left;Posterior Pain Description 1: Constant Pain Intervention(s) 1: Medication (see MAR) Activity Tolerance: VSS Please refer to the flowsheet for vital signs taken during this treatment. After treatment:  
[x]         Patient left in no apparent distress sitting up in chair 
[]         Patient left in no apparent distress in bed 
[x]         Call bell left within reach [x]         Nursing notified 
[x]         Caregiver present 
[]         Bed alarm activated COMMUNICATION/EDUCATION:  
The patients plan of care was discussed with: Physical Therapist, Occupational Therapist and Registered Nurse. [x]         Fall prevention education was provided and the patient/caregiver indicated understanding. [x]         Patient/family have participated as able in goal setting and plan of care. [x]         Patient/family agree to work toward stated goals and plan of care. []         Patient understands intent and goals of therapy, but is neutral about his/her participation. []         Patient is unable to participate in goal setting and plan of care. Thank you for this referral. 
Marielle Song, PT, DPT Time Calculation: 20 mins

## 2018-10-30 NOTE — PROGRESS NOTES
Problem: Self Care Deficits Care Plan (Adult) Goal: *Acute Goals and Plan of Care (Insert Text) Occupational Therapy Goals Initiated 10/30/2018 1. Patient will perform lower body dressing with modified independence using AE PRN within 7 days. 2.  Patient will perform toilet transfer with modified independence using most appropriate DME within 7 days. 3.  Patient will grooming at the sink x5 minutes with modified independence within 7 days. 4.  Patient will don/doff back brace at modified independence within 7 days. 5.  Patient will verbalize/demonstrate 3/3 back precautions during ADL tasks without cues within 7 days. Occupational Therapy EVALUATION Patient: Bobby Minaya (79 y.o. female) Date: 10/30/2018 Primary Diagnosis: S/P lumbar spinal fusion Precautions: Back, Fall, Other (comment)(brace when up) ASSESSMENT : 
Based on the objective data described below, the patient presents with up to Mod A for upper body ADLs, Min A for lower body ADLs, and CGA for functional mobility with RW s/p readmission after POD #4 lumbar fusion. Patient IND-Mod I at baseline, however unable to care for herself at home, HHPT reporting patient almost bed-bound. Lives alone in an apartment with some DME/AE, ambulating with RW. Received up in the chair, agreeable to therapy, able to recall 3/3 spinal precautions. Transfers & ambulation to/from bathroom completed with CGA & RW, cues provided for safety with bathroom mobility & grab bar use. Educated on adaptive strategies for pericare within spinal precautions, patient acknowledging understanding. Able to tailor sit in the chair with Min A for RLE d/t decreased ROM, cues provided for not pulling with BUEs. Set back up in the chair for lunch despite patient reporting fatigue. Patient with decreased activity tolerance, global deconditioning, requiring assist for some ADLs, therefore not safe to return home alone at this time.  Recommend rehab upon d/c to maximize safety, functional independence, and mobility. Patient will benefit from skilled intervention to address the above impairments. Patients rehabilitation potential is considered to be Good Factors which may influence rehabilitation potential include:  
[]             None noted []             Mental ability/status []             Medical condition []             Home/family situation and support systems []             Safety awareness []             Pain tolerance/management 
[]             Other: PLAN : 
Recommendations and Planned Interventions: 
[x]               Self Care Training                  [x]        Therapeutic Activities [x]               Functional Mobility Training    []        Cognitive Retraining 
[x]               Therapeutic Exercises           [x]        Endurance Activities [x]               Balance Training                   []        Neuromuscular Re-Education []               Visual/Perceptual Training     [x]   Home Safety Training 
[x]               Patient Education                 [x]        Family Training/Education []               Other (comment): Frequency/Duration: Patient will be followed by occupational therapy 5 times a week to address goals. Discharge Recommendations: Rehab Further Equipment Recommendations for Discharge: TBD by rehab SUBJECTIVE:  
Patient stated I'll do whatever you want so I can get to eat my lunch.  OBJECTIVE DATA SUMMARY:  
HISTORY:  
Past Medical History:  
Diagnosis Date  Achilles tendon tear  Osteoarthritis  Varicose vein of leg Past Surgical History:  
Procedure Laterality Date [address]  HX BREAST BIOPSY FATTY LUMP - BENIGN  
 HX CYSTOCELE REPAIR    
 HX HYSTERECTOMY  HX KNEE ARTHROSCOPY Left MENISCUS REPAIR  
 HX ORTHOPAEDIC    
 STEM CELL INJECTIONS (21) FOR LT. ANKLE WOUND  HX ORTHOPAEDIC Left  ACHILLES TENDON REPAIR, DIDN'T WORK  
  HX RECTOCELE REPAIR    
 HX UROLOGICAL CYSTOCELE, RECTOCELE REPAIR  
 HX WRIST FRACTURE TX Right Prior Level of Function/Environment/Context: PTA, IND-Mod I prior to lumbar fusion 4 days ago, driving, completing ADL & IADL tasks. Post-op, able to complete someday-to-day tasks, however unable to completely care for herself alone. Home Situation Home Environment: Private residence # Steps to Enter: 4 Rails to Enter: Yes One/Two Story Residence: One story Living Alone: Yes Support Systems: Friends \ neighbors Patient Expects to be Discharged to[de-identified] Rehabilitation facility Current DME Used/Available at Home: Cane, straight, Raised toilet seat, Shower chair, Walker, rolling, Adaptive dressing aides, Adaptive bathing aides(reacher, dressing stick, long hanlded shoe horn & sponge) Tub or Shower Type: Tub/Shower combination Hand dominance: RightEXAMINATION OF PERFORMANCE DEFICITS: 
Cognitive/Behavioral Status: 
Neurologic State: Alert Orientation Level: Oriented X4 Cognition: Appropriate for age attention/concentration; Appropriate decision making; Appropriate safety awareness; Follows commands Perception: Appears intact Perseveration: No perseveration noted Safety/Judgement: Awareness of environment; Fall prevention; Insight into deficits Skin: Appears intact Edema: None noted in BUEs Hearing: Auditory Auditory Impairment: None Vision/Perceptual:   
Tracking: Able to track stimulus in all quadrants w/o difficulty Acuity: Within Defined Limits Corrective Lenses: Glasses Range of Motion: In [address] AROM: Generally decreased, functional 
  
  
  
  
  
  
  
Strength: In [address] Strength: Generally decreased, functional 
  
  
  
  
Coordination: 
Coordination: Within functional limits Fine Motor Skills-Upper: Left Intact; Right Intact Gross Motor Skills-Upper: Left Intact; Right Intact Tone & Sensation: In [address] Tone: Normal 
 Sensation: Impaired(tingling in all toes (baseline)) Balance: 
Sitting: Intact Standing: Impaired; With support(RW) 
Standing - Static: Good Standing - Dynamic : Fair Functional Mobility and Transfers for ADLs:Bed Mobility: 
Rolling: Minimum assistance;Assist x1 Supine to Sit: (up in chair) Scooting: Contact guard assistance;Assist x1 Transfers: 
Sit to Stand: Contact guard assistance; Adaptive equipment(RW) 
Stand to Sit: Contact guard assistance; Adaptive equipment(RW) Bathroom Mobility: Contact guard assistance(cues for safety) Toilet Transfer : Contact guard assistance; Adaptive equipment(cues for safety & transfer technique ) ADL Assessment: 
Feeding: Independent Oral Facial Hygiene/Grooming: Contact guard assistance(standing) Bathing: Minimum assistance(d/t decreased activity tolerance & stand balance) Upper Body Dressing: Moderate assistance(including TLSO) Lower Body Dressing: Minimum assistance(for standing balance & safety) Toileting: Minimum assistance(for bowel hygiene & safety) ADL Intervention and task modifications: 
Feeding Feeding Assistance: Independent Container Management: Independent Cutting Food: Independent Utensil Management: Independent Food to Mouth: Independent Drink to Mouth: Independent Lower Body Bathing Perineal  : Compensatory technique training Lower Body : Compensatory technique training Upper Body Dressing Assistance Orthotics(Brace): (TLSO donned with PT assist this morning) Lower Body Dressing Assistance Underpants: Compensatory technique training Pants With Elastic Waist: Compensatory technique training Pants With Button/Zipper: Compensatory technique training Socks: Minimum assistance; Compensatory technique training(A for RLE) Leg Crossed Method Used: Yes(Min A for RLE, cues for not pulling with BUEs) Position Performed: Seated in chair Cues: Verbal cues provided;Visual cues provided;Physical assistance Toileting Bladder Hygiene: Stand-by assistance(seated) Bowel Hygiene: Compensatory technique training Clothing Management: Minimum assistance Cues: Verbal cues provided;Visual cues provided Adaptive Equipment: Grab bars; Beto [place] Cognitive Retraining Safety/Judgement: Awareness of environment; Fall prevention; Insight into deficits Therapeutic Exercise: 
Ambulation to/fro bathroom with RW with CGA & cues for safety Functional Measure: 
Barthel Index: 
 
Bathin Bladder: 10 Bowels: 10 
Groomin Dressin Feeding: 10 Mobility: 10 Stairs: 5 Toilet Use: 5 Transfer (Bed to Chair and Back): 10 Total: 70 Barthel and G-code impairment scale: 
Percentage of impairment CH 
0% CI 
1-19% CJ 
20-39% CK 
40-59% CL 
60-79% CM 
80-99% CN 
100% Barthel Score 0-100 100 99-80 79-60 59-40 20-39 1-19 
 0 Barthel Score 0-20 20 17-19 13-16 9-12 5-8 1-4 0 The Barthel ADL Index: Guidelines 1. The index should be used as a record of what a patient does, not as a record of what a patient could do. 2. The main aim is to establish degree of independence from any help, physical or verbal, however minor and for whatever reason. 3. The need for supervision renders the patient not independent. 4. A patient's performance should be established using the best available evidence. Asking the patient, friends/relatives and nurses are the usual sources, but direct observation and common sense are also important. However direct testing is not needed. 5. Usually the patient's performance over the preceding 24-48 hours is important, but occasionally longer periods will be relevant. 6. Middle categories imply that the patient supplies over 50 per cent of the effort. 7. Use of aids to be independent is allowed. Krissy Merrill., Barthel, D.W. (2132). Functional evaluation: the Barthel Index. [address] [facility] (14)2. Shannan JIMBO Singh, Micheline Sinclair., Ivory Garner., [place], [address] (1999). Measuring the change indisability after inpatient rehabilitation; comparison of the responsiveness of the Barthel Index and Functional [place] Measure. Journal of Neurology, Neurosurgery, and Psychiatry, 66(4), 389-448. BRENDA Almanza, JASON Villegas, & Mazin Awan M.A. (2004.) Assessment of post-stroke quality of life in cost-effectiveness studies: The usefulness of the Barthel Index and the EuroQoL-5D. [facility], 13, 041-09 G codes: In compliance with CMSs Claims Based Outcome Reporting, the following G-code set was chosen for this patient based on their primary functional limitation being treated: The outcome measure chosen to determine the severity of the functional limitation was the Barthel Index with a score of 70/100 which was correlated with the impairment scale. ? Self Care:  
  - CURRENT STATUS: CJ - 20%-39% impaired, limited or restricted  - GOAL STATUS: CI - 1%-19% impaired, limited or restricted  - D/C STATUS:  ---------------To be determined--------------- Occupational Therapy Evaluation Charge Determination History Examination Decision-Making LOW Complexity : Brief history review  LOW Complexity : 1-3 performance deficits relating to physical, cognitive , or psychosocial skils that result in activity limitations and / or participation restrictions  LOW Complexity : No comorbidities that affect functional and no verbal or physical assistance needed to complete eval tasks Based on the above components, the patient evaluation is determined to be of the following complexity level: LOW Pain: 
Pain Scale 1: Numeric (0 - 10) Pain Intensity 1: 6 Pain Location 1: Back;Leg 
Pain Orientation 1: Left;Posterior Pain Description 1: Constant Pain Intervention(s) 1: Medication (see MAR) Activity Tolerance:  
Good Please refer to the flowsheet for vital signs taken during this treatment. After treatment:  
[x] Patient left in no apparent distress sitting up in chair 
[] Patient left in no apparent distress in bed 
[x] Call bell left within reach [x] Nursing notified 
[] Caregiver present 
[] Bed alarm activated COMMUNICATION/EDUCATION:  
The patients plan of care was discussed with: Physical Therapist, Registered Nurse and . [x] Home safety education was provided and the patient/caregiver indicated understanding. [x] Patient/family have participated as able in goal setting and plan of care. [x] Patient/family agree to work toward stated goals and plan of care. [] Patient understands intent and goals of therapy, but is neutral about his/her participation. [] Patient is unable to participate in goal setting and plan of care. This patients plan of care is appropriate for delegation to hospitals. Thank you for this referral. 
Lucius Brown, OT Time Calculation: 18 mins

## 2018-10-30 NOTE — ROUTINE PROCESS
TRANSFER - OUT REPORT: 
 
Verbal report given to AYALA Nielson(name) on Lisa Chen  being transferred to 540(unit) for routine progression of care Report consisted of patients Situation, Background, Assessment and  
Recommendations(SBAR). Information from the following report(s) SBAR, Kardex, ED Summary, [facility] and Recent Results was reviewed with the receiving nurse. Lines:  
Peripheral IV 10/29/18 Left Forearm (Active) Site Assessment Clean, dry, & intact 10/29/2018  6:37 PM  
Phlebitis Assessment 0 10/29/2018  6:37 PM  
Infiltration Assessment 0 10/29/2018  6:37 PM  
Dressing Status Clean, dry, & intact 10/29/2018  6:37 PM  
Dressing Type Transparent 10/29/2018  6:37 PM  
Hub Color/Line Status Pink 10/29/2018  6:37 PM  
Action Taken Blood drawn 10/29/2018  6:37 PM  
  
 
Opportunity for questions and clarification was provided. Patient transported with: 
 Epiclist

## 2018-10-31 VITALS
TEMPERATURE: 98.6 F | BODY MASS INDEX: 37.59 KG/M2 | WEIGHT: 219 LBS | DIASTOLIC BLOOD PRESSURE: 71 MMHG | HEART RATE: 69 BPM | OXYGEN SATURATION: 98 % | SYSTOLIC BLOOD PRESSURE: 120 MMHG | RESPIRATION RATE: 17 BRPM

## 2018-10-31 PROCEDURE — 96376 TX/PRO/DX INJ SAME DRUG ADON: CPT

## 2018-10-31 PROCEDURE — 96374 THER/PROPH/DIAG INJ IV PUSH: CPT

## 2018-10-31 PROCEDURE — 74011000250 HC RX REV CODE- 250: Performed by: NURSE PRACTITIONER

## 2018-10-31 PROCEDURE — 74011250637 HC RX REV CODE- 250/637: Performed by: NURSE PRACTITIONER

## 2018-10-31 PROCEDURE — 74011250637 HC RX REV CODE- 250/637: Performed by: PHYSICIAN ASSISTANT

## 2018-10-31 PROCEDURE — 99218 HC RM OBSERVATION: CPT

## 2018-10-31 PROCEDURE — 74011250636 HC RX REV CODE- 250/636: Performed by: PHYSICIAN ASSISTANT

## 2018-10-31 PROCEDURE — 97116 GAIT TRAINING THERAPY: CPT

## 2018-10-31 PROCEDURE — 97530 THERAPEUTIC ACTIVITIES: CPT

## 2018-10-31 PROCEDURE — 97535 SELF CARE MNGMENT TRAINING: CPT

## 2018-10-31 RX ORDER — FACIAL-BODY WIPES
10 EACH TOPICAL
Qty: 8 SUPPOSITORY | Refills: 0 | Status: SHIPPED | OUTPATIENT
Start: 2018-10-31

## 2018-10-31 RX ORDER — DEXAMETHASONE SODIUM PHOSPHATE 4 MG/ML
4 INJECTION, SOLUTION INTRA-ARTICULAR; INTRALESIONAL; INTRAMUSCULAR; INTRAVENOUS; SOFT TISSUE EVERY 6 HOURS
Status: DISCONTINUED | OUTPATIENT
Start: 2018-10-31 | End: 2018-10-31 | Stop reason: HOSPADM

## 2018-10-31 RX ORDER — FACIAL-BODY WIPES
10 EACH TOPICAL ONCE
Status: COMPLETED | OUTPATIENT
Start: 2018-10-31 | End: 2018-10-31

## 2018-10-31 RX ORDER — METHYLPREDNISOLONE 4 MG/1
TABLET ORAL
Qty: 1 DOSE PACK | Refills: 0 | Status: SHIPPED | OUTPATIENT
Start: 2018-10-31 | End: 2020-01-21 | Stop reason: ALTCHOICE

## 2018-10-31 RX ORDER — AMOXICILLIN 250 MG
1 CAPSULE ORAL 2 TIMES DAILY
Qty: 30 TAB | Refills: 0 | Status: SHIPPED | OUTPATIENT
Start: 2018-10-31 | End: 2020-01-21

## 2018-10-31 RX ADMIN — DEXAMETHASONE SODIUM PHOSPHATE 4 MG: 4 INJECTION, SOLUTION INTRAMUSCULAR; INTRAVENOUS at 15:29

## 2018-10-31 RX ADMIN — HYDROCODONE BITARTRATE AND ACETAMINOPHEN 1 TABLET: 5; 325 TABLET ORAL at 09:07

## 2018-10-31 RX ADMIN — DEXAMETHASONE SODIUM PHOSPHATE 4 MG: 4 INJECTION, SOLUTION INTRAMUSCULAR; INTRAVENOUS at 08:40

## 2018-10-31 RX ADMIN — HYDROCODONE BITARTRATE AND ACETAMINOPHEN 1 TABLET: 5; 325 TABLET ORAL at 02:11

## 2018-10-31 RX ADMIN — HYDROCODONE BITARTRATE AND ACETAMINOPHEN 1 TABLET: 5; 325 TABLET ORAL at 12:54

## 2018-10-31 RX ADMIN — DOCUSATE SODIUM 100 MG: 100 CAPSULE, LIQUID FILLED ORAL at 08:40

## 2018-10-31 RX ADMIN — Medication 10 ML: at 08:40

## 2018-10-31 RX ADMIN — HYDROCODONE BITARTRATE AND ACETAMINOPHEN 1 TABLET: 5; 325 TABLET ORAL at 19:59

## 2018-10-31 RX ADMIN — BISACODYL 10 MG: 10 SUPPOSITORY RECTAL at 12:45

## 2018-10-31 RX ADMIN — Medication 10 ML: at 15:29

## 2018-10-31 NOTE — PROGRESS NOTES
Bedside and Verbal shift change report given to Lesley (oncoming nurse) by Sharath Lenz (offgoing nurse). Report included the following information SBAR, Kardex, MAR and Recent Results.

## 2018-10-31 NOTE — PROGRESS NOTES
Orthopedic Spine Progress Note 2018 7:59 AM  
Admit Date: 10/29/2018 Procedure :Lumbar fusion L3-5 Subjective:  
 
Ricky Schumacher appears well. She complains of pain at the hips. She ambulated a short distance with physical therapy yesterday Tolerating diet No N/V 
Voiding Pain Control:  
Pain Assessment Pain Scale 1: Numeric (0 - 10) Pain Intensity 1: 7 Pain Onset 1: s/p surgery Pain Location 1: Hip Pain Orientation 1: Left Pain Description 1: Aching Pain Intervention(s) 1: Medication (see MAR) Objective:  
 
 
  
Physical Exam: 
General:  Alert and oriented. No acute distress. Heart:  Respirations unlabored. Abdomen:  
Extremities: Soft, non-tender. No evidence of cyanosis. Pulses palpable in both upper and lower extremities. Neurologic: 
Musculoskeletal:  No new motor deficits. Neurovascular exam within normal limits. Sensation stable. Motor: unchanged C5-T1 and L2-S1. Carolina's sign negative in bilateral lower extremities. Calves soft, nontender upon palpation and with passive twitch. Moves both upper and lower extremities. Incision: clean, dry, and intact. No significant erythema or swelling. No active drainage noted. Vital Signs:   
Blood pressure 100/59, pulse 66, temperature 98.6 °F (37 °C), resp. rate 16, weight 99.3 kg (219 lb), SpO2 95 %. Temp (24hrs), Av.7 °F (37.1 °C), Min:98.5 °F (36.9 °C), Max:99.1 °F (37.3 °C) LAB:   
Recent Labs 10/30/18 
0252 HCT 28.5* HGB 9.1*  
 Lab Results Component Value Date/Time Sodium 143 10/30/2018 02:52 AM  
 Potassium 4.0 10/30/2018 02:52 AM  
 Chloride 109 (H) 10/30/2018 02:52 AM  
 CO2 23 10/30/2018 02:52 AM  
 Glucose 104 (H) 10/30/2018 02:52 AM  
 BUN 11 10/30/2018 02:52 AM  
 Creatinine 0.55 10/30/2018 02:52 AM  
 Calcium 8.2 (L) 10/30/2018 02:52 AM  
 
 
Intake/Output:No intake/output data recorded. No intake/output data recorded. PT/OT:  
Gait:  Gait Base of Support: Widened Speed/Leatha: Pace decreased (<100 feet/min), Slow Step Length: Left shortened, Right shortened Gait Abnormalities: Antalgic, Decreased step clearance, Shuffling gait Ambulation - Level of Assistance: Contact guard assistance, Additional time, Assist x1 Distance (ft): 80 Feet (ft) Assistive Device: Brace/Splint, Gait belt, Walker, rolling Assessment:  
 
Lumbar fusion L3-5 Plan: 1. Continue PT/OT 2. Continue established methods of pain control 3. VTE Prophylaxes - TEDS & SCDs 4. Encouraged use of ICS 5. Decadron for hip pain 6. Discharge to SNF vs Rehab today pending bed availability Signed By: Luis Schultz PA-C

## 2018-10-31 NOTE — PROGRESS NOTES
Problem: Mobility Impaired (Adult and Pediatric) Goal: *Acute Goals and Plan of Care (Insert Text) Physical Therapy Goals Initiated 10/30/2018 1. Patient will move from supine to sit and sit to supine  in bed with modified independence within 4 days. 2. Patient will perform sit to stand with modified independence within 4 days. 3. Patient will ambulate with modified independence for 250 feet with the least restrictive device within 4 days. 4. Patient will ascend/descend 4 stairs with 2 handrail(s) with modified independence within 4 days. 5. Patient will verbalize and demonstrate understanding of spinal precautions (No bending, lifting greater than 5 lbs, or twisting; log-roll technique; frequent repositioning as instructed) within 4 days. physical Therapy TREATMENT Patient: Mkial Mcgregor (79 y.o. female) Date: 10/31/2018 Diagnosis: S/P lumbar spinal fusion <principal problem not specified> Precautions: Back, Fall, No bending, no lifting greater than 5 lbs, no twisting, log-roll technique, repositioning every 20-30 min except when sleeping, brace when OOB Chart, physical therapy assessment, plan of care and goals were reviewed. ASSESSMENT: 
Pt received in supine and agreeable to participate, performed log roll transfer to EOB with supervision and additional time, sit to stand with CGA, pt tolerated ambulation with rolling walker x 75 feet with CGA with slow, generally steady gait,  limited activity tolerance due to bilateral hip pain, L worse than R, rated pain 6/10, pt lives alone and only had a friend help her with transportation home, pt reports her pain increased at home and was unable to tolerate OOB for long, had difficulty with preparing meals, using a rolling walker at this time,  spoke with pt to inform her that the insurance company did not authorize SNF, per  pt open to hiring home care staff, recommend resuming home health PT 
Progression toward goals: []      Improving appropriately and progressing toward goals [x]      Improving slowly and progressing toward goals 
[]      Not making progress toward goals and plan of care will be adjusted PLAN: 
Patient continues to benefit from skilled intervention to address the above impairments. Continue treatment per established plan of care. Discharge Recommendations:  Home Health Further Equipment Recommendations for Discharge:  rolling walker(owns) SUBJECTIVE:  
Patient expressed her concern with current situation. Pt is aware that her insurance company denied SNF. The patient stated 3/3 back precautions. Reviewed all 3 with patient. OBJECTIVE DATA SUMMARY:  
Critical Behavior: 
Neurologic State: Alert Orientation Level: Oriented X4 Cognition: Appropriate for age attention/concentration Safety/Judgement: Awareness of environment, Fall prevention, good safety awareness Functional Mobility Training: 
Bed Mobility: 
Log Rolling: Supervision;Assist x1 Supine to Sit: Supervision;Assist x1 Scooting: Supervision Brace donned with  moderate assistance Transfers: 
Sit to Stand: Contact guard assistance;Assist x1;Additional time Stand to Sit: Assist x1;Additional time;Contact guard assistance Balance: 
Sitting: Intact Standing: Impaired Standing - Static: Good Standing - Dynamic : Good(with walker support)Ambulation/Gait Training: 
Distance (ft): 75 Feet (ft) Assistive Device: Gait belt;Walker, rolling;Brace/Splint Ambulation - Level of Assistance: Contact guard assistance;Assist x1 Gait Abnormalities: Antalgic;Decreased step clearance Base of Support: Widened Speed/Leatha: Slow Step Length: Left shortened;Right shortened Pt also assisted to bathroom and then agreeable to sit up in chair Pain: 
Pain Scale 1: Numeric (0 - 10) Pain Intensity 1: 6 Pain Location 1: Hip Pain Orientation 1: Left;Right(left> right) Pain Description 1: Aching Pain Intervention(s) 1: Medication (see MAR) Activity Tolerance:  
Fair, limited ambulation tolerance due to bilateral hip pain, L worse than R After treatment:  
[x]  Patient left in no apparent distress sitting up in chair 
[]  Patient left in no apparent distress in bed 
[x]  Call bell left within reach [x]  Nursing notified 
[]  Caregiver present 
[]  Bed alarm activated COMMUNICATION/COLLABORATION:  
The patients plan of care was discussed with: Registered Nurse Giovani Or Time Calculation: 23 mins

## 2018-10-31 NOTE — PROGRESS NOTES
Spiritual Care Partner Volunteer visited patient in Rm [id number] on 10/31/2018. Documented by: 
Chaplain Grubbs MDiv, MS, [place] [id number] PRAY ([id number])

## 2018-10-31 NOTE — ROUTINE PROCESS
Bedside shift change report given to Emily (oncoming nurse) by Ga Boyer (offgoing nurse). Report included the following information SBAR, Kardex, ED Summary, OR Summary, Procedure Summary, Intake/Output and MAR.

## 2018-10-31 NOTE — PROGRESS NOTES
Cm informed patient and the team that she has been denied for SNF placement. Patient will go home with home health through [facility]. Cm also provided her with a list of private duty companies, suggested she call certain ones and to call now to get the process started. Patient stated she has a one-month supply of food at home, already prepared. She has friends to take her home this evening. Patient should not have any reason to not succeed at home. New home health orders have been submitted to the agency.  
[employer] , [place]

## 2018-10-31 NOTE — PROGRESS NOTES
Problem: Self Care Deficits Care Plan (Adult) Goal: *Acute Goals and Plan of Care (Insert Text) Occupational Therapy Goals Initiated 10/30/2018 1. Patient will perform lower body dressing with modified independence using AE PRN within 7 days. 2.  Patient will perform toilet transfer with modified independence using most appropriate DME within 7 days. 3.  Patient will grooming at the sink x5 minutes with modified independence within 7 days. 4.  Patient will don/doff back brace at modified independence within 7 days. 5.  Patient will verbalize/demonstrate 3/3 back precautions during ADL tasks without cues within 7 days. Occupational Therapy TREATMENT Patient: Joanna Clements (79 y.o. female) Date: 10/31/2018 Diagnosis: S/P lumbar spinal fusion <principal problem not specified> Precautions: Back, Fall, Other (comment)(brace when up) No bending, no lifting greater than 5 lbs, no twisting, log-roll technique, repositioning every 20-30 min except when sleeping, TLSO brace when OOB Chart, occupational therapy assessment, plan of care, and goals were reviewed. ASSESSMENT: 
Patient received in supine, agreeable to therapy. Education provided in preparation for d/c on ADL modifications, energy conservation, home safety, and repositioning every 30-45 minutes with handouts provided & patient acknowledging understanding. Encouraged shower chair use, pacing with activities, and remaining active as able. Patient is safe to d/c home from OT standpoint with [address] [place] [address], has hired private caregivers to assist with IADLs & prepared freezer meals for a month. Progression toward goals: 
[x]          Improving appropriately and progressing toward goals 
[]          Improving slowly and progressing toward goals 
[]          Not making progress toward goals and plan of care will be adjusted PLAN: 
Patient continues to benefit from skilled intervention to address the above impairments. Continue treatment per established plan of care. Discharge Recommendations:  Home Health Further Equipment Recommendations for Discharge:  Patient has all DME/AE needs SUBJECTIVE:  
Patient stated I just hired a home care company.  The patient stated 3/3 back precautions. Reviewed all 3 with patient. OBJECTIVE DATA SUMMARY:  
Cognitive/Behavioral Status: 
Neurologic State: Alert Orientation Level: Oriented X4 Cognition: Appropriate for age attention/concentration, Appropriate decision making, Appropriate safety awareness, Follows commands Safety/Judgement: Awareness of environment, Fall prevention, Insight into deficitsFunctional Mobility and Transfers for ADLs:Bed Mobility: 
Rolling: Supervision;Assist x1 Supine to Sit: Supervision;Assist x1 Scooting: Supervision Transfers: 
Sit to Stand: Contact guard assistance;Assist x1;Additional time Balance: 
Sitting: Intact Standing: Impaired Standing - Static: Good Standing - Dynamic : Good(with walker support) ADL Intervention and Instruction: 
  
 
Grooming Brushing Teeth: Compensatory technique training Cues: Verbal cues provided;Visual cues provided(handout) Lower Body Bathing Perineal  : Compensatory technique training Lower Body : Compensatory technique training Cues: Verbal cues provided;Visual cues provided(handout) Adaptive Equipment: (recommended shower chair use) Lower Body Dressing Assistance Underpants: Compensatory technique training Pants With Elastic Waist: Compensatory technique training Pants With Button/Zipper: Compensatory technique training Socks: Compensatory technique training Slip on Shoes with Back: Compensatory technique training Slip on Shoes Without Back: Compensatory technique training Cues: Verbal cues provided;Visual cues provided(handout) Toileting Toileting Assistance: (completed Mod I prior to session) Bowel Hygiene: Compensatory technique training Cues: Verbal cues provided;Visual cues provided(handout) Cognitive Retraining Safety/Judgement: Awareness of environment; Fall prevention; Insight into deficits Bathing: Patient instructed and indicated understanding when bathing to not submerge wound in water, stand to shower or sponge bathe, cover wound with plastic and tape to ensure no water reaches bandage/wound without cues. Dressing brace: Patient instructed and demonstrated to don/doff velcro on brace using dominant side, keeping non-dominant side intact. Patient instructed and demonstrated in meantime of being able to stand with back against wall to don/doff brace, to don/doff seated using lap and bed/chair surface to support brace while manipulating. Dressing lower body: Patient instructed to don brace first and on the benefits to remain seated to don all clothing to increase independence with precautions and pain management. Toileting: Patient instructed on the benefits of using flushable wet wipes and toilet tongs if decreased reach or pain for amy care. Also, the benefits of a reacher to aid in clothing management. Home safety: Patient instructed and indicated understanding on home modifications and safety (raise height of ADL objects, appropriate height of chair surfaces, recliner safety, change of floor surfaces, clear pathways) to increase independence and fall prevention with Mod I.   
 
Standing: Patient instructed and indicated understanding to walk up to sink/counter top/surfaces, step into walker, square off while using objects, slide objects along surfaces, to increase adherence to back precautions and fall prevention. Patient instructed to increase amount of time standing in order to increase independence and tolerance with ADLs. During prolonged standing, can open cabinet door or place foot on stool to decrease spinal pressure/increase pain.   
 
Tub transfer: Patient instructed and indicated understanding regarding when it is safe to begin transfer into tub (complete stairs with PT, advance exercises with PT high enough to clear tub height, and while clothes donned practice with another person present). Patient instructed and indicated understanding to use the same technique as used with stairs when entering and exiting tub (\"up with good leg, down with bad leg\"). Patient instructed and indicated understanding the benefits of maintaining activity tolerance, functional mobility, and independence with self care tasks during acute stay  to ensure safe return home and to baseline. Encouraged patient to increase frequency and duration OOB, not sitting longer than 30 mins without marching/walking with staff, be out of bed for all meals, perform daily ADLs (as approved by RN/MD regarding bathing etc), and performing functional mobility to/from bathroom. Patient instruction and indicated understanding on body mechanics, ergonomics and gravitational force on the spine during different body positions to plan activities in prep for return home to complete instrumental ADLs and back to work safely. Patient instructed and indicated understanding energy conservation techniques to increase independence and safety during ADLs, visual handout provided. Educated patient on strategies to maximize her quality of life and decrease her stress/anxiety. 1. Deep breathing 2. Educated on pacing and making sure he/she takes short frequent breaks (e.g. In the shower wash the upper body, rest for 1 minute, then wash the lower body, etc) 3. Educated on using cooler water in the shower so as to not get fatigued from the heat 4. Educated on drying off by using a derek cloth robe 5. Educated on re-arranging his/her routine to allow for rest breaks in the morning routine 6. Educated on using a mantra and medication to decrease feelings of anxiety, especially when short of breath 8. Educated on looking at the consequences of his/her actions before deciding he/she needs to take on a task (e.g not getting down on one's hands and knees to wash floors because it will take all of one's energy for the day and result in exhaustion). 9. Educated on fall alert necklaces/bracelets to increase safety 10. Educated on DME used to help conserve energy, such as a shower seat, a stool or chair in the kitchen, and pushing or pulling items instead of carrying them Therapeutic Exercises:  
Patient instructed on the benefits shoulder exercises to increase independence with ADLs, active ROM, and strength of spine. Patient instructed and demonstrated techniques of activating abdomen muscles. Patient instructed and indicated understanding how to progress reps and sets against gravity to then working up to 5 lbs, until surgeon clears, in which can use household items for weights. Pain: 
Pain Scale 1: Numeric (0 - 10) Pain Intensity 1: 6 Pain Location 1: Hip Pain Orientation 1: Left;Right(left > right) Pain Description 1: Aching Pain Intervention(s) 1: Medication (see MAR) Activity Tolerance:  
Good active listening & understanding of education Please refer to the flowsheet for vital signs taken during this treatment. After treatment:  
[] Patient left in no apparent distress sitting up in chair 
[x] Patient left in no apparent distress in bed 
[x] Call bell left within reach [x] Nursing notified 
[] Caregiver present 
[] Bed alarm activated COMMUNICATION/COLLABORATION:  
The patients plan of care was discussed with: Physical Therapist and Registered Nurse Chelsea Moreno OT Time Calculation: 23 mins

## 2018-10-31 NOTE — DISCHARGE SUMMARY
[facility]   [address]  [phone]     Discharge Summary       PATIENT ID: Nimesh Crow  MRN: [id number]   YOB: 1950    DATE OF ADMISSION: 10/29/2018  4:27 PM    DATE OF DISCHARGE: 10/31/2018   PRIMARY CARE PROVIDER: Solo Kumar MD     CONSULTATIONS: IP CONSULT TO ORTHOPEDIC SURGERY    History of Present Illness:  Nimesh Crow is a 80 y.o. female with history of chronic back pan de lumbar spondylolisthesis. She underwent a L3-S1 lumbar fusion on 10/26 with Dr. Bernie Grijalva. She progressed well with PT and OT following her surgery. She was discharged home on postoperative day #3 in stable condition. She lives independently and unfortunately had difficulty with progressive pain when home. She did not fill her pain medicine prescriptions as she could not find her discharge paperwork. When home health came out on Monday they discovered the patient was in significant pain and unable to care for herself. The home health therapist recommended Ms. Niño present to [facility] for evaluation. Hospital Course:  Nimesh Crow was admitted for pain control and possible rehab placement. She had postoperative lumbar xrays which showed proper hardware positioning. She was mobilized with physical therapy. Her insurance company considered her too high level for admission to a SNF. She was ambulating 75 feet with contact guard assistance. She was afebrile and vital signs were stable. Her incision was clean and dry with intact zipline device. Calves were soft and non-tender bilaterally. The patient was tolerating a regular diet. She had a small bowel movement on 10/31.  Her pain was well controlled with Norco.     Hemoglobin prior to discharge were   Lab Results   Component Value Date/Time    HGB 9.1 (L) 10/30/2018 02:52 AM        Nimesh Crow made satisfactory progress with physical therapy and was discharged to Home with home health in stable condition on hospital day 2. She was provided with routine postoperative instructions and advised to follow up with Dr. Jessica Frankel within 2 weeks following discharge from the hospital.  She was provided with a list of home health aids to assist her with her ADL needs. She was given detailed instructions regarding pain management, bowel management, and wound care. She verbalized understanding. FOLLOW UP APPOINTMENTS:    Follow-up Information     Follow up With Specialties Details Why Contact Info    Anna Ibrahim MD Internal Medicine   [facility] [address]   [address] [facility]  [phone]             DISCHARGE MEDICATIONS:  Current Discharge Medication List      START taking these medications    Details   bisacodyl (DULCOLAX) 10 mg suppository Insert 10 mg into rectum daily as needed. Qty: 8 Suppository, Refills: 0      senna-docusate (PERICOLACE) 8.6-50 mg per tablet Take 1 Tab by mouth two (2) times a day. While on opioids. Hold for diarrhea  Qty: 30 Tab, Refills: 0      methylPREDNISolone (MEDROL DOSEPACK) 4 mg tablet As directed. Qty: 1 Dose Pack, Refills: 0      HYDROcodone-acetaminophen (NORCO) 5-325 mg per tablet Take 1-2 Tabs by mouth every four (4) hours as needed. Max Daily Amount: 12 Tabs. Qty: 50 Tab, Refills: 0    Associated Diagnoses: Spinal stenosis of lumbar region without neurogenic claudication         STOP taking these medications       acetaminophen (TYLENOL EXTRA STRENGTH) 500 mg tablet Comments:   Reason for Stopping:         docusate sodium (COLACE) 100 mg capsule Comments:   Reason for Stopping:               PHYSICAL EXAMINATION AT DISCHARGE:  General: Pleasant, alert, cooperative, no distress. Resp: Equal chest expansion. No accessory muscle use. CV:  No cyanosis or clubbing. No edema appreciated in the extremities.   Gastrointestinal:  Soft, non-tender, non-distended. Neurological: Speech clear. Face symmetric. Follows commands. HO. Sensation intact to light touch. Motor: unchanged L2-S1. Musculoskeletal:  Calves soft, non-tender upon palpation or with passive stretch. Skin: Lumbar Incision - clean, dry. Zipline intact. No significant erythema or swelling.       CHRONIC MEDICAL DIAGNOSES:  Problem List as of 10/31/2018 Date Reviewed: 10/26/2018          Codes Class Noted - Resolved    S/P lumbar spinal fusion ICD-10-CM: Z98.1  ICD-9-CM: V45.4  10/29/2018 - Present        Lumbar stenosis ICD-10-CM: M48.061  ICD-9-CM: 724.02  10/25/2018 - Present        Spinal stenosis, lumbar ICD-10-CM: M48.061  ICD-9-CM: 724.02  10/25/2018 - Present        Achilles tendon tear ICD-10-CM: L56.750Q  ICD-9-CM: 845.09  5/1/2015 - Present              Signed:   Yobani Stewart NP  10/31/2018  3:13 PM

## 2018-11-01 NOTE — PROGRESS NOTES
D/C paperwork printed and reviewed with pt. Pt given copy of d/c instructions. Pt verbalized understanding of information. This nurse emphasized the importance of taking prescribed medications when at home as well as making follow up apts. Pt lives at home by herself. Dsg changed to Aquacel dsg so can be managed easier at home. Home health to do weekly dsg changes to back. Pt provided with 2 extra Aquacel dsg's to go home with. IV discontinued. Pt changed and sitting on edge of bed at moment. Nursing staff to assist wheeling pt off floor soon.

## 2018-11-02 NOTE — DISCHARGE SUMMARY
Procedure(s):  L3-S1 LUMBAR LAMINECTOMY WITH POSTERIOR LUMBAR FUSION L3-5 Operative Report      Date of Surgery: 10/25/2018     Preoperative Diagnosis:  SPONDYLOLITHIASIS, STENOSIS, SCOLIOSIS    Postoperative Diagnosis: SPONDYLOLITHIASIS, STENOSIS, SCOLIOSIS    Procedure: Procedure(s):  L3-S1 LUMBAR LAMINECTOMY WITH POSTERIOR LUMBAR FUSION L3-5     Surgeon: Lior Sandoval MD    History and Hospital Course:  Jonathon Frey is a pleasant 80y.o. year old female who has complaints of worsening chronic lumbar back pain. Diagnostic testing found degenerative scoliosis as well as severe spinal stenosis from L3-S1. Having failed conservative treatment, the patient elected to undergo operative intervention. She tolerated the procedure well and was admitted post-operatively for antibiotics and pain control. On post-op day 1, the patient was doing well. She had some complaints of lower back pain but pain managed. .  IV antibiotics were continued. She was started on a regular diet. The PCA was discontinued and the patient was started on oral pain medications. She was allowed to started getting out of bed with physical therapy. On post-op day 2, the patient continued to progress well. Pain managed. Drain was removed. Continued progressing with physical therapy. Frontal, lateral and coned L5-S1 lateral views of the lumbar spine radiographs obtained. Impression - Posterior decompression, and pedicle screw fixation with interbody spacers L3-L5 shows stable and intact hardware. There is no vertebral subluxation. There is multilevel degenerative disc disease. There is dextroscoliosis. Stable fusion. No evidence of hardware failure. On post-op day 3, the patient was deemed ready for discharge. She was discharged to rehab. She was tolerating a regular diet and pain was well controlled with oral pain medications. The patient was discharged with prescriptions for pain medications.   She was instructed to wear her brace at all times when out of bed. She was instructed to limit her bending, lifting and twisting. The patient will follow-up in 10-14 days for repeat x-rays and a wound check.       Signed By: Raul Hernandez MD

## 2019-03-01 ENCOUNTER — HOSPITAL ENCOUNTER (OUTPATIENT)
Dept: MAMMOGRAPHY | Age: 69
Discharge: HOME | End: 2019-03-01
Payer: COMMERCIAL

## 2019-03-01 DIAGNOSIS — Z12.39 SCREENING BREAST EXAMINATION: ICD-10-CM

## 2019-03-01 DIAGNOSIS — Z78.0 MENOPAUSE: ICD-10-CM

## 2019-03-01 DIAGNOSIS — Z13.820 SCREENING FOR OSTEOPOROSIS: ICD-10-CM

## 2019-03-01 PROCEDURE — 77067 SCR MAMMO BI INCL CAD: CPT

## 2019-03-01 PROCEDURE — 77080 DXA BONE DENSITY AXIAL: CPT

## 2020-01-21 ENCOUNTER — HOSPITAL ENCOUNTER (OUTPATIENT)
Dept: PREADMISSION TESTING | Age: 70
Discharge: HOME | End: 2020-01-21
Payer: COMMERCIAL

## 2020-01-21 VITALS
BODY MASS INDEX: 39.2 KG/M2 | SYSTOLIC BLOOD PRESSURE: 116 MMHG | TEMPERATURE: 97.6 F | WEIGHT: 213 LBS | RESPIRATION RATE: 20 BRPM | HEART RATE: 63 BPM | DIASTOLIC BLOOD PRESSURE: 74 MMHG | OXYGEN SATURATION: 98 % | HEIGHT: 62 IN

## 2020-01-21 LAB
ABO + RH BLD: NORMAL
ANION GAP SERPL CALC-SCNC: 4 MMOL/L (ref 5–15)
APPEARANCE UR: CLEAR
BACTERIA URNS QL MICRO: NEGATIVE /HPF
BILIRUB UR QL: NEGATIVE
BLOOD GROUP ANTIBODIES SERPL: NORMAL
BUN SERPL-MCNC: 21 MG/DL (ref 6–20)
BUN/CREAT SERPL: 28 (ref 12–20)
CALCIUM SERPL-MCNC: 8.9 MG/DL (ref 8.5–10.1)
CHLORIDE SERPL-SCNC: 107 MMOL/L (ref 97–108)
CO2 SERPL-SCNC: 28 MMOL/L (ref 21–32)
COLOR UR: NORMAL
CREAT SERPL-MCNC: 0.75 MG/DL (ref 0.55–1.02)
EPITH CASTS URNS QL MICRO: NORMAL /LPF
ERYTHROCYTE [DISTWIDTH] IN BLOOD BY AUTOMATED COUNT: 12.5 % (ref 11.5–14.5)
EST. AVERAGE GLUCOSE BLD GHB EST-MCNC: 108 MG/DL
GLUCOSE SERPL-MCNC: 93 MG/DL (ref 65–100)
GLUCOSE UR STRIP.AUTO-MCNC: NEGATIVE MG/DL
HBA1C MFR BLD: 5.4 % (ref 4–5.6)
HCT VFR BLD AUTO: 40.8 % (ref 35–47)
HGB BLD-MCNC: 12.8 G/DL (ref 11.5–16)
HGB UR QL STRIP: NEGATIVE
HYALINE CASTS URNS QL MICRO: NORMAL /LPF (ref 0–5)
INR PPP: 1 (ref 0.9–1.1)
KETONES UR QL STRIP.AUTO: NEGATIVE MG/DL
LEUKOCYTE ESTERASE UR QL STRIP.AUTO: NEGATIVE
MCH RBC QN AUTO: 29.8 PG (ref 26–34)
MCHC RBC AUTO-ENTMCNC: 31.4 G/DL (ref 30–36.5)
MCV RBC AUTO: 94.9 FL (ref 80–99)
NITRITE UR QL STRIP.AUTO: NEGATIVE
NRBC # BLD: 0 K/UL (ref 0–0.01)
NRBC BLD-RTO: 0 PER 100 WBC
PH UR STRIP: 5 [PH] (ref 5–8)
PLATELET # BLD AUTO: 287 K/UL (ref 150–400)
PMV BLD AUTO: 10.6 FL (ref 8.9–12.9)
POTASSIUM SERPL-SCNC: 4.4 MMOL/L (ref 3.5–5.1)
PROT UR STRIP-MCNC: NEGATIVE MG/DL
PROTHROMBIN TIME: 9.8 SEC (ref 9–11.1)
RBC # BLD AUTO: 4.3 M/UL (ref 3.8–5.2)
RBC #/AREA URNS HPF: NORMAL /HPF (ref 0–5)
SODIUM SERPL-SCNC: 139 MMOL/L (ref 136–145)
SP GR UR REFRACTOMETRY: 1.02 (ref 1–1.03)
SPECIMEN EXP DATE BLD: NORMAL
UA: UC IF INDICATED,UAUC: NORMAL
UROBILINOGEN UR QL STRIP.AUTO: 0.2 EU/DL (ref 0.2–1)
WBC # BLD AUTO: 5.5 K/UL (ref 3.6–11)
WBC URNS QL MICRO: NORMAL /HPF (ref 0–4)

## 2020-01-21 PROCEDURE — 80048 BASIC METABOLIC PNL TOTAL CA: CPT

## 2020-01-21 PROCEDURE — 93005 ELECTROCARDIOGRAM TRACING: CPT

## 2020-01-21 PROCEDURE — 85027 COMPLETE CBC AUTOMATED: CPT

## 2020-01-21 PROCEDURE — 83036 HEMOGLOBIN GLYCOSYLATED A1C: CPT

## 2020-01-21 PROCEDURE — 86900 BLOOD TYPING SEROLOGIC ABO: CPT

## 2020-01-21 PROCEDURE — 85610 PROTHROMBIN TIME: CPT

## 2020-01-21 PROCEDURE — 81001 URINALYSIS AUTO W/SCOPE: CPT

## 2020-01-21 RX ORDER — IBUPROFEN 200 MG
600 TABLET ORAL
COMMUNITY
End: 2020-01-31

## 2020-01-21 RX ORDER — GABAPENTIN 300 MG/1
300 CAPSULE ORAL
COMMUNITY

## 2020-01-21 RX ORDER — FAMOTIDINE 20 MG/1
20 TABLET, FILM COATED ORAL
COMMUNITY

## 2020-01-21 RX ORDER — CLOBETASOL PROPIONATE 0.5 MG/G
OINTMENT TOPICAL
COMMUNITY

## 2020-01-21 NOTE — PERIOP NOTES
Preoperative instructions reviewed with patient, including instructions on medications. Patient given (2) bottles  of Chlorhexidne soultion. Instructions (reviewed in class) on use of CHG solution. Patient given SSI infection FAQS sheet, as well as a  MRSA/MSSA treatment instruction sheet  With an explanation to patient that they will be notified if treatment instructions need to be initiated. Patient was given the opportunity to ask questions on the information provided. New diet instructions given and patient voiced understanding of same.

## 2020-01-22 LAB
ATRIAL RATE: 50 BPM
BACTERIA SPEC CULT: NORMAL
BACTERIA SPEC CULT: NORMAL
CALCULATED P AXIS, ECG09: 0 DEGREES
CALCULATED R AXIS, ECG10: 8 DEGREES
CALCULATED T AXIS, ECG11: 45 DEGREES
DIAGNOSIS, 93000: NORMAL
P-R INTERVAL, ECG05: 182 MS
Q-T INTERVAL, ECG07: 424 MS
QRS DURATION, ECG06: 88 MS
QTC CALCULATION (BEZET), ECG08: 386 MS
SERVICE CMNT-IMP: NORMAL
VENTRICULAR RATE, ECG03: 50 BPM

## 2020-01-29 ENCOUNTER — ANESTHESIA EVENT (OUTPATIENT)
Dept: SURGERY | Age: 70
DRG: 470 | End: 2020-01-29
Payer: COMMERCIAL

## 2020-01-30 ENCOUNTER — ANESTHESIA (OUTPATIENT)
Dept: SURGERY | Age: 70
DRG: 470 | End: 2020-01-30
Payer: COMMERCIAL

## 2020-01-30 ENCOUNTER — HOSPITAL ENCOUNTER (INPATIENT)
Age: 70
LOS: 1 days | Discharge: HOME HEALTH | DRG: 470 | End: 2020-01-31
Attending: ORTHOPAEDIC SURGERY | Admitting: ORTHOPAEDIC SURGERY
Payer: COMMERCIAL

## 2020-01-30 DIAGNOSIS — M17.11 PRIMARY OSTEOARTHRITIS OF RIGHT KNEE: Primary | ICD-10-CM

## 2020-01-30 PROBLEM — M17.9 OA (OSTEOARTHRITIS) OF KNEE: Status: ACTIVE | Noted: 2020-01-30

## 2020-01-30 PROBLEM — M17.9 OSTEOARTHROSIS OF KNEE: Status: ACTIVE | Noted: 2020-01-30

## 2020-01-30 LAB
GLUCOSE BLD STRIP.AUTO-MCNC: 101 MG/DL (ref 65–100)
SERVICE CMNT-IMP: ABNORMAL

## 2020-01-30 PROCEDURE — 77030002933 HC SUT MCRYL J&J -A: Performed by: ORTHOPAEDIC SURGERY

## 2020-01-30 PROCEDURE — 77030008684 HC TU ET CUF COVD -B: Performed by: ANESTHESIOLOGY

## 2020-01-30 PROCEDURE — 77030003601 HC NDL NRV BLK BBMI -A

## 2020-01-30 PROCEDURE — 77030040361 HC SLV COMPR DVT MDII -B

## 2020-01-30 PROCEDURE — 74011000250 HC RX REV CODE- 250: Performed by: ORTHOPAEDIC SURGERY

## 2020-01-30 PROCEDURE — C9290 INJ, BUPIVACAINE LIPOSOME: HCPCS | Performed by: ORTHOPAEDIC SURGERY

## 2020-01-30 PROCEDURE — 77030018836 HC SOL IRR NACL ICUM -A: Performed by: ORTHOPAEDIC SURGERY

## 2020-01-30 PROCEDURE — 74011000258 HC RX REV CODE- 258: Performed by: ORTHOPAEDIC SURGERY

## 2020-01-30 PROCEDURE — 76010000173 HC OR TIME 3 TO 3.5 HR INTENSV-TIER 1: Performed by: ORTHOPAEDIC SURGERY

## 2020-01-30 PROCEDURE — 97116 GAIT TRAINING THERAPY: CPT

## 2020-01-30 PROCEDURE — 77030040922 HC BLNKT HYPOTHRM STRY -A

## 2020-01-30 PROCEDURE — 77030036638 HC ACC KT GPS KNE V2 EXAC -D: Performed by: ORTHOPAEDIC SURGERY

## 2020-01-30 PROCEDURE — 74011250636 HC RX REV CODE- 250/636: Performed by: NURSE ANESTHETIST, CERTIFIED REGISTERED

## 2020-01-30 PROCEDURE — 77030018846 HC SOL IRR STRL H20 ICUM -A: Performed by: ORTHOPAEDIC SURGERY

## 2020-01-30 PROCEDURE — 74011250637 HC RX REV CODE- 250/637: Performed by: ORTHOPAEDIC SURGERY

## 2020-01-30 PROCEDURE — 77030013079 HC BLNKT BAIR HGGR 3M -A: Performed by: ANESTHESIOLOGY

## 2020-01-30 PROCEDURE — 77030039497 HC CST PAD STERILE CHCS -A: Performed by: ORTHOPAEDIC SURGERY

## 2020-01-30 PROCEDURE — 74011000250 HC RX REV CODE- 250: Performed by: NURSE ANESTHETIST, CERTIFIED REGISTERED

## 2020-01-30 PROCEDURE — 77030000032 HC CUF TRNQT ZIMM -B: Performed by: ORTHOPAEDIC SURGERY

## 2020-01-30 PROCEDURE — 77030027138 HC INCENT SPIROMETER -A

## 2020-01-30 PROCEDURE — 77030026438 HC STYL ET INTUB CARD -A: Performed by: ANESTHESIOLOGY

## 2020-01-30 PROCEDURE — 3E0T3BZ INTRODUCTION OF ANESTHETIC AGENT INTO PERIPHERAL NERVES AND PLEXI, PERCUTANEOUS APPROACH: ICD-10-PCS | Performed by: ANESTHESIOLOGY

## 2020-01-30 PROCEDURE — 76210000006 HC OR PH I REC 0.5 TO 1 HR: Performed by: ORTHOPAEDIC SURGERY

## 2020-01-30 PROCEDURE — 0SRC0J9 REPLACEMENT OF RIGHT KNEE JOINT WITH SYNTHETIC SUBSTITUTE, CEMENTED, OPEN APPROACH: ICD-10-PCS | Performed by: ORTHOPAEDIC SURGERY

## 2020-01-30 PROCEDURE — 77030028907 HC WRP KNEE WO BGS SOLM -B

## 2020-01-30 PROCEDURE — 97110 THERAPEUTIC EXERCISES: CPT

## 2020-01-30 PROCEDURE — 77030035236 HC SUT PDS STRATFX BARB J&J -B: Performed by: ORTHOPAEDIC SURGERY

## 2020-01-30 PROCEDURE — 76060000037 HC ANESTHESIA 3 TO 3.5 HR: Performed by: ORTHOPAEDIC SURGERY

## 2020-01-30 PROCEDURE — 74011250636 HC RX REV CODE- 250/636: Performed by: ANESTHESIOLOGY

## 2020-01-30 PROCEDURE — C1713 ANCHOR/SCREW BN/BN,TIS/BN: HCPCS | Performed by: ORTHOPAEDIC SURGERY

## 2020-01-30 PROCEDURE — 77030031139 HC SUT VCRL2 J&J -A: Performed by: ORTHOPAEDIC SURGERY

## 2020-01-30 PROCEDURE — 64450 NJX AA&/STRD OTHER PN/BRANCH: CPT

## 2020-01-30 PROCEDURE — 77030011640 HC PAD GRND REM COVD -A: Performed by: ORTHOPAEDIC SURGERY

## 2020-01-30 PROCEDURE — 74011250636 HC RX REV CODE- 250/636: Performed by: ORTHOPAEDIC SURGERY

## 2020-01-30 PROCEDURE — 77030018673: Performed by: ORTHOPAEDIC SURGERY

## 2020-01-30 PROCEDURE — 82962 GLUCOSE BLOOD TEST: CPT

## 2020-01-30 PROCEDURE — 65270000029 HC RM PRIVATE

## 2020-01-30 PROCEDURE — 97161 PT EVAL LOW COMPLEX 20 MIN: CPT

## 2020-01-30 PROCEDURE — 8E0YXBZ COMPUTER ASSISTED PROCEDURE OF LOWER EXTREMITY: ICD-10-PCS | Performed by: ORTHOPAEDIC SURGERY

## 2020-01-30 PROCEDURE — C1776 JOINT DEVICE (IMPLANTABLE): HCPCS | Performed by: ORTHOPAEDIC SURGERY

## 2020-01-30 PROCEDURE — 77030006835 HC BLD SAW SAG STRY -B: Performed by: ORTHOPAEDIC SURGERY

## 2020-01-30 DEVICE — CEMENT BNE 80 GM SYS GENTA CEMEX: Type: IMPLANTABLE DEVICE | Site: KNEE | Status: FUNCTIONAL

## 2020-01-30 DEVICE — COMPONENT PAT 32MM DIA 7MM THCK 3 PEG 3 RND PRI STD CEM NP: Type: IMPLANTABLE DEVICE | Site: KNEE | Status: FUNCTIONAL

## 2020-01-30 DEVICE — IMPLANTABLE DEVICE: Type: IMPLANTABLE DEVICE | Site: KNEE | Status: FUNCTIONAL

## 2020-01-30 DEVICE — COMPONENT KNEE CEM STD POLYETH: Type: IMPLANTABLE DEVICE | Site: KNEE | Status: FUNCTIONAL

## 2020-01-30 RX ORDER — EPHEDRINE SULFATE/0.9% NACL/PF 50 MG/5 ML
SYRINGE (ML) INTRAVENOUS AS NEEDED
Status: DISCONTINUED | OUTPATIENT
Start: 2020-01-30 | End: 2020-01-30 | Stop reason: HOSPADM

## 2020-01-30 RX ORDER — LIDOCAINE HYDROCHLORIDE 10 MG/ML
0.1 INJECTION, SOLUTION EPIDURAL; INFILTRATION; INTRACAUDAL; PERINEURAL AS NEEDED
Status: DISCONTINUED | OUTPATIENT
Start: 2020-01-30 | End: 2020-01-30 | Stop reason: HOSPADM

## 2020-01-30 RX ORDER — PHENYLEPHRINE HCL IN 0.9% NACL 0.4MG/10ML
SYRINGE (ML) INTRAVENOUS AS NEEDED
Status: DISCONTINUED | OUTPATIENT
Start: 2020-01-30 | End: 2020-01-30 | Stop reason: HOSPADM

## 2020-01-30 RX ORDER — MIDAZOLAM HYDROCHLORIDE 1 MG/ML
1 INJECTION, SOLUTION INTRAMUSCULAR; INTRAVENOUS AS NEEDED
Status: DISCONTINUED | OUTPATIENT
Start: 2020-01-30 | End: 2020-01-30 | Stop reason: HOSPADM

## 2020-01-30 RX ORDER — SODIUM CHLORIDE 0.9 % (FLUSH) 0.9 %
5-40 SYRINGE (ML) INJECTION EVERY 8 HOURS
Status: DISCONTINUED | OUTPATIENT
Start: 2020-01-30 | End: 2020-01-31 | Stop reason: HOSPADM

## 2020-01-30 RX ORDER — POLYETHYLENE GLYCOL 3350 17 G/17G
17 POWDER, FOR SOLUTION ORAL DAILY
Status: DISCONTINUED | OUTPATIENT
Start: 2020-01-31 | End: 2020-01-31 | Stop reason: HOSPADM

## 2020-01-30 RX ORDER — KETOROLAC TROMETHAMINE 30 MG/ML
INJECTION, SOLUTION INTRAMUSCULAR; INTRAVENOUS AS NEEDED
Status: DISCONTINUED | OUTPATIENT
Start: 2020-01-30 | End: 2020-01-30 | Stop reason: HOSPADM

## 2020-01-30 RX ORDER — ONDANSETRON 2 MG/ML
4 INJECTION INTRAMUSCULAR; INTRAVENOUS
Status: ACTIVE | OUTPATIENT
Start: 2020-01-30 | End: 2020-01-31

## 2020-01-30 RX ORDER — AMOXICILLIN 250 MG
1 CAPSULE ORAL 2 TIMES DAILY
Status: DISCONTINUED | OUTPATIENT
Start: 2020-01-30 | End: 2020-01-31 | Stop reason: HOSPADM

## 2020-01-30 RX ORDER — SODIUM CHLORIDE, SODIUM LACTATE, POTASSIUM CHLORIDE, CALCIUM CHLORIDE 600; 310; 30; 20 MG/100ML; MG/100ML; MG/100ML; MG/100ML
100 INJECTION, SOLUTION INTRAVENOUS CONTINUOUS
Status: DISCONTINUED | OUTPATIENT
Start: 2020-01-30 | End: 2020-01-30 | Stop reason: HOSPADM

## 2020-01-30 RX ORDER — SODIUM CHLORIDE 0.9 % (FLUSH) 0.9 %
5-40 SYRINGE (ML) INJECTION AS NEEDED
Status: DISCONTINUED | OUTPATIENT
Start: 2020-01-30 | End: 2020-01-30 | Stop reason: HOSPADM

## 2020-01-30 RX ORDER — GABAPENTIN 300 MG/1
300 CAPSULE ORAL 3 TIMES DAILY
Status: DISCONTINUED | OUTPATIENT
Start: 2020-01-30 | End: 2020-01-31 | Stop reason: HOSPADM

## 2020-01-30 RX ORDER — HYDROXYZINE HYDROCHLORIDE 10 MG/1
10 TABLET, FILM COATED ORAL
Status: DISCONTINUED | OUTPATIENT
Start: 2020-01-30 | End: 2020-01-31 | Stop reason: HOSPADM

## 2020-01-30 RX ORDER — SODIUM CHLORIDE 0.9 % (FLUSH) 0.9 %
5-40 SYRINGE (ML) INJECTION EVERY 8 HOURS
Status: DISCONTINUED | OUTPATIENT
Start: 2020-01-30 | End: 2020-01-30 | Stop reason: HOSPADM

## 2020-01-30 RX ORDER — WARFARIN SODIUM 5 MG/1
5 TABLET ORAL ONCE
Status: COMPLETED | OUTPATIENT
Start: 2020-01-30 | End: 2020-01-30

## 2020-01-30 RX ORDER — DEXAMETHASONE SODIUM PHOSPHATE 4 MG/ML
INJECTION, SOLUTION INTRA-ARTICULAR; INTRALESIONAL; INTRAMUSCULAR; INTRAVENOUS; SOFT TISSUE AS NEEDED
Status: DISCONTINUED | OUTPATIENT
Start: 2020-01-30 | End: 2020-01-30 | Stop reason: HOSPADM

## 2020-01-30 RX ORDER — FENTANYL CITRATE 50 UG/ML
25 INJECTION, SOLUTION INTRAMUSCULAR; INTRAVENOUS
Status: DISCONTINUED | OUTPATIENT
Start: 2020-01-30 | End: 2020-01-30 | Stop reason: HOSPADM

## 2020-01-30 RX ORDER — BACITRACIN 500 [USP'U]/G
OINTMENT TOPICAL AS NEEDED
Status: DISCONTINUED | OUTPATIENT
Start: 2020-01-30 | End: 2020-01-30 | Stop reason: HOSPADM

## 2020-01-30 RX ORDER — NALOXONE HYDROCHLORIDE 0.4 MG/ML
0.4 INJECTION, SOLUTION INTRAMUSCULAR; INTRAVENOUS; SUBCUTANEOUS AS NEEDED
Status: DISCONTINUED | OUTPATIENT
Start: 2020-01-30 | End: 2020-01-31 | Stop reason: HOSPADM

## 2020-01-30 RX ORDER — CEFAZOLIN SODIUM/WATER 2 G/20 ML
2 SYRINGE (ML) INTRAVENOUS EVERY 8 HOURS
Status: COMPLETED | OUTPATIENT
Start: 2020-01-30 | End: 2020-01-31

## 2020-01-30 RX ORDER — FENTANYL CITRATE 50 UG/ML
50 INJECTION, SOLUTION INTRAMUSCULAR; INTRAVENOUS AS NEEDED
Status: DISCONTINUED | OUTPATIENT
Start: 2020-01-30 | End: 2020-01-30 | Stop reason: HOSPADM

## 2020-01-30 RX ORDER — BACITRACIN 500 [USP'U]/G
OINTMENT TOPICAL 3 TIMES DAILY
Status: DISCONTINUED | OUTPATIENT
Start: 2020-01-30 | End: 2020-01-30

## 2020-01-30 RX ORDER — NEOSTIGMINE METHYLSULFATE 1 MG/ML
INJECTION, SOLUTION INTRAVENOUS AS NEEDED
Status: DISCONTINUED | OUTPATIENT
Start: 2020-01-30 | End: 2020-01-30 | Stop reason: HOSPADM

## 2020-01-30 RX ORDER — MIDAZOLAM HYDROCHLORIDE 1 MG/ML
0.5 INJECTION, SOLUTION INTRAMUSCULAR; INTRAVENOUS
Status: DISCONTINUED | OUTPATIENT
Start: 2020-01-30 | End: 2020-01-30 | Stop reason: HOSPADM

## 2020-01-30 RX ORDER — PROPOFOL 10 MG/ML
INJECTION, EMULSION INTRAVENOUS AS NEEDED
Status: DISCONTINUED | OUTPATIENT
Start: 2020-01-30 | End: 2020-01-30 | Stop reason: HOSPADM

## 2020-01-30 RX ORDER — CEFAZOLIN SODIUM/WATER 2 G/20 ML
2 SYRINGE (ML) INTRAVENOUS ONCE
Status: COMPLETED | OUTPATIENT
Start: 2020-01-30 | End: 2020-01-30

## 2020-01-30 RX ORDER — ROCURONIUM BROMIDE 10 MG/ML
INJECTION, SOLUTION INTRAVENOUS AS NEEDED
Status: DISCONTINUED | OUTPATIENT
Start: 2020-01-30 | End: 2020-01-30 | Stop reason: HOSPADM

## 2020-01-30 RX ORDER — CEFAZOLIN SODIUM 1 G/3ML
INJECTION, POWDER, FOR SOLUTION INTRAMUSCULAR; INTRAVENOUS AS NEEDED
Status: DISCONTINUED | OUTPATIENT
Start: 2020-01-30 | End: 2020-01-30

## 2020-01-30 RX ORDER — ONDANSETRON 2 MG/ML
INJECTION INTRAMUSCULAR; INTRAVENOUS AS NEEDED
Status: DISCONTINUED | OUTPATIENT
Start: 2020-01-30 | End: 2020-01-30 | Stop reason: HOSPADM

## 2020-01-30 RX ORDER — OXYCODONE HYDROCHLORIDE 5 MG/1
10 TABLET ORAL
Status: DISCONTINUED | OUTPATIENT
Start: 2020-01-30 | End: 2020-01-31 | Stop reason: HOSPADM

## 2020-01-30 RX ORDER — KETOROLAC TROMETHAMINE 30 MG/ML
15 INJECTION, SOLUTION INTRAMUSCULAR; INTRAVENOUS EVERY 6 HOURS
Status: COMPLETED | OUTPATIENT
Start: 2020-01-30 | End: 2020-01-31

## 2020-01-30 RX ORDER — OXYCODONE HYDROCHLORIDE 5 MG/1
5 TABLET ORAL
Status: DISCONTINUED | OUTPATIENT
Start: 2020-01-30 | End: 2020-01-31 | Stop reason: HOSPADM

## 2020-01-30 RX ORDER — FACIAL-BODY WIPES
10 EACH TOPICAL DAILY PRN
Status: DISCONTINUED | OUTPATIENT
Start: 2020-02-01 | End: 2020-01-31 | Stop reason: HOSPADM

## 2020-01-30 RX ORDER — SUCCINYLCHOLINE CHLORIDE 20 MG/ML
INJECTION INTRAMUSCULAR; INTRAVENOUS AS NEEDED
Status: DISCONTINUED | OUTPATIENT
Start: 2020-01-30 | End: 2020-01-30 | Stop reason: HOSPADM

## 2020-01-30 RX ORDER — ROPIVACAINE HYDROCHLORIDE 5 MG/ML
150 INJECTION, SOLUTION EPIDURAL; INFILTRATION; PERINEURAL AS NEEDED
Status: DISCONTINUED | OUTPATIENT
Start: 2020-01-30 | End: 2020-01-30 | Stop reason: HOSPADM

## 2020-01-30 RX ORDER — SODIUM CHLORIDE 0.9 % (FLUSH) 0.9 %
5-40 SYRINGE (ML) INJECTION AS NEEDED
Status: DISCONTINUED | OUTPATIENT
Start: 2020-01-30 | End: 2020-01-31 | Stop reason: HOSPADM

## 2020-01-30 RX ORDER — ACETAMINOPHEN 325 MG/1
650 TABLET ORAL
Status: DISCONTINUED | OUTPATIENT
Start: 2020-01-30 | End: 2020-01-31 | Stop reason: HOSPADM

## 2020-01-30 RX ORDER — DIPHENHYDRAMINE HYDROCHLORIDE 50 MG/ML
12.5 INJECTION, SOLUTION INTRAMUSCULAR; INTRAVENOUS AS NEEDED
Status: DISCONTINUED | OUTPATIENT
Start: 2020-01-30 | End: 2020-01-30 | Stop reason: HOSPADM

## 2020-01-30 RX ORDER — HYDROMORPHONE HYDROCHLORIDE 1 MG/ML
0.5 INJECTION, SOLUTION INTRAMUSCULAR; INTRAVENOUS; SUBCUTANEOUS
Status: ACTIVE | OUTPATIENT
Start: 2020-01-30 | End: 2020-01-31

## 2020-01-30 RX ORDER — LIDOCAINE HYDROCHLORIDE 20 MG/ML
INJECTION, SOLUTION EPIDURAL; INFILTRATION; INTRACAUDAL; PERINEURAL AS NEEDED
Status: DISCONTINUED | OUTPATIENT
Start: 2020-01-30 | End: 2020-01-30 | Stop reason: HOSPADM

## 2020-01-30 RX ORDER — MORPHINE SULFATE 4 MG/ML
INJECTION INTRAVENOUS AS NEEDED
Status: DISCONTINUED | OUTPATIENT
Start: 2020-01-30 | End: 2020-01-30 | Stop reason: HOSPADM

## 2020-01-30 RX ORDER — FAMOTIDINE 20 MG/1
20 TABLET, FILM COATED ORAL 2 TIMES DAILY
Status: DISCONTINUED | OUTPATIENT
Start: 2020-01-30 | End: 2020-01-31 | Stop reason: HOSPADM

## 2020-01-30 RX ORDER — FENTANYL CITRATE 50 UG/ML
INJECTION, SOLUTION INTRAMUSCULAR; INTRAVENOUS AS NEEDED
Status: DISCONTINUED | OUTPATIENT
Start: 2020-01-30 | End: 2020-01-30 | Stop reason: HOSPADM

## 2020-01-30 RX ORDER — ROPIVACAINE HYDROCHLORIDE 5 MG/ML
INJECTION, SOLUTION EPIDURAL; INFILTRATION; PERINEURAL
Status: COMPLETED | OUTPATIENT
Start: 2020-01-30 | End: 2020-01-30

## 2020-01-30 RX ORDER — HYDROMORPHONE HYDROCHLORIDE 1 MG/ML
0.2 INJECTION, SOLUTION INTRAMUSCULAR; INTRAVENOUS; SUBCUTANEOUS
Status: DISCONTINUED | OUTPATIENT
Start: 2020-01-30 | End: 2020-01-30 | Stop reason: HOSPADM

## 2020-01-30 RX ORDER — GLYCOPYRROLATE 0.2 MG/ML
INJECTION INTRAMUSCULAR; INTRAVENOUS AS NEEDED
Status: DISCONTINUED | OUTPATIENT
Start: 2020-01-30 | End: 2020-01-30 | Stop reason: HOSPADM

## 2020-01-30 RX ORDER — SODIUM CHLORIDE 9 MG/ML
125 INJECTION, SOLUTION INTRAVENOUS CONTINUOUS
Status: DISPENSED | OUTPATIENT
Start: 2020-01-30 | End: 2020-01-31

## 2020-01-30 RX ORDER — PROPOFOL 10 MG/ML
INJECTION, EMULSION INTRAVENOUS
Status: DISCONTINUED | OUTPATIENT
Start: 2020-01-30 | End: 2020-01-30 | Stop reason: HOSPADM

## 2020-01-30 RX ADMIN — ROPIVACAINE HYDROCHLORIDE 150 MG: 5 INJECTION, SOLUTION EPIDURAL; INFILTRATION; PERINEURAL at 07:15

## 2020-01-30 RX ADMIN — DEXAMETHASONE SODIUM PHOSPHATE 4 MG: 4 INJECTION, SOLUTION INTRAMUSCULAR; INTRAVENOUS at 07:52

## 2020-01-30 RX ADMIN — ROPIVACAINE HYDROCHLORIDE 30 ML: 5 INJECTION, SOLUTION EPIDURAL; INFILTRATION; PERINEURAL at 07:22

## 2020-01-30 RX ADMIN — ROCURONIUM BROMIDE 10 MG: 10 SOLUTION INTRAVENOUS at 09:34

## 2020-01-30 RX ADMIN — SENNOSIDES AND DOCUSATE SODIUM 1 TABLET: 8.6; 5 TABLET ORAL at 19:01

## 2020-01-30 RX ADMIN — LIDOCAINE HYDROCHLORIDE 80 MG: 20 INJECTION, SOLUTION EPIDURAL; INFILTRATION; INTRACAUDAL; PERINEURAL at 07:45

## 2020-01-30 RX ADMIN — ONDANSETRON HYDROCHLORIDE 4 MG: 2 INJECTION, SOLUTION INTRAMUSCULAR; INTRAVENOUS at 07:52

## 2020-01-30 RX ADMIN — ROCURONIUM BROMIDE 10 MG: 10 SOLUTION INTRAVENOUS at 07:45

## 2020-01-30 RX ADMIN — ACETAMINOPHEN 650 MG: 325 TABLET ORAL at 19:01

## 2020-01-30 RX ADMIN — PROPOFOL 120 MG: 10 INJECTION, EMULSION INTRAVENOUS at 07:45

## 2020-01-30 RX ADMIN — Medication 80 MCG: at 08:58

## 2020-01-30 RX ADMIN — SODIUM CHLORIDE, SODIUM LACTATE, POTASSIUM CHLORIDE, AND CALCIUM CHLORIDE 100 ML/HR: 600; 310; 30; 20 INJECTION, SOLUTION INTRAVENOUS at 06:43

## 2020-01-30 RX ADMIN — Medication 10 MG: at 08:34

## 2020-01-30 RX ADMIN — ROCURONIUM BROMIDE 10 MG: 10 SOLUTION INTRAVENOUS at 08:31

## 2020-01-30 RX ADMIN — Medication 3 MG: at 10:13

## 2020-01-30 RX ADMIN — MIDAZOLAM 2 MG: 1 INJECTION INTRAMUSCULAR; INTRAVENOUS at 07:13

## 2020-01-30 RX ADMIN — TRANEXAMIC ACID 1 G: 100 INJECTION, SOLUTION INTRAVENOUS at 07:50

## 2020-01-30 RX ADMIN — SODIUM CHLORIDE, SODIUM LACTATE, POTASSIUM CHLORIDE, AND CALCIUM CHLORIDE 100 ML/HR: 600; 310; 30; 20 INJECTION, SOLUTION INTRAVENOUS at 06:40

## 2020-01-30 RX ADMIN — FENTANYL CITRATE 50 MCG: 50 INJECTION, SOLUTION INTRAMUSCULAR; INTRAVENOUS at 08:05

## 2020-01-30 RX ADMIN — ROCURONIUM BROMIDE 30 MG: 10 SOLUTION INTRAVENOUS at 07:57

## 2020-01-30 RX ADMIN — Medication 2 G: at 16:51

## 2020-01-30 RX ADMIN — KETOROLAC TROMETHAMINE 15 MG: 30 INJECTION, SOLUTION INTRAMUSCULAR; INTRAVENOUS at 10:15

## 2020-01-30 RX ADMIN — SODIUM CHLORIDE 125 ML/HR: 900 INJECTION, SOLUTION INTRAVENOUS at 16:07

## 2020-01-30 RX ADMIN — FAMOTIDINE 20 MG: 20 TABLET ORAL at 19:01

## 2020-01-30 RX ADMIN — SODIUM CHLORIDE 125 ML/HR: 900 INJECTION, SOLUTION INTRAVENOUS at 11:15

## 2020-01-30 RX ADMIN — MORPHINE SULFATE 4 MG: 4 INJECTION INTRAVENOUS at 10:15

## 2020-01-30 RX ADMIN — KETOROLAC TROMETHAMINE 15 MG: 30 INJECTION, SOLUTION INTRAMUSCULAR at 16:52

## 2020-01-30 RX ADMIN — WARFARIN SODIUM 5 MG: 5 TABLET ORAL at 16:51

## 2020-01-30 RX ADMIN — Medication 10 ML: at 16:52

## 2020-01-30 RX ADMIN — Medication 2 G: at 07:53

## 2020-01-30 RX ADMIN — GABAPENTIN 300 MG: 300 CAPSULE ORAL at 16:51

## 2020-01-30 RX ADMIN — PROPOFOL 40 MG: 10 INJECTION, EMULSION INTRAVENOUS at 07:34

## 2020-01-30 RX ADMIN — FENTANYL CITRATE 50 MCG: 50 INJECTION, SOLUTION INTRAMUSCULAR; INTRAVENOUS at 07:13

## 2020-01-30 RX ADMIN — Medication 10 ML: at 22:00

## 2020-01-30 RX ADMIN — Medication 40 MCG: at 09:50

## 2020-01-30 RX ADMIN — GLYCOPYRROLATE 0.4 MG: 0.2 INJECTION, SOLUTION INTRAMUSCULAR; INTRAVENOUS at 10:13

## 2020-01-30 RX ADMIN — SUCCINYLCHOLINE CHLORIDE 140 MG: 20 INJECTION, SOLUTION INTRAMUSCULAR; INTRAVENOUS at 07:46

## 2020-01-30 RX ADMIN — PROPOFOL 70 MCG/KG/MIN: 10 INJECTION, EMULSION INTRAVENOUS at 07:34

## 2020-01-30 NOTE — PROGRESS NOTES
Pharmacist Note - Warfarin Dosing  Consult provided for this 72 y.o. female to manage warfarin for DVT ppx s/p RTK    INR Goal: 1.7- 2.2    Preop Dose: Elizabeth Old- none    Drugs that may increase INR: None  Drugs that may decrease INR: None  Other current anticoagulants/ drugs that may increase bleeding risk: NSAID  Risk factors: Age > 65  Daily INR ordered: YES    No results for input(s): HGB, INR, HGBEXT, INREXT in the last 72 hours. Date               INR                 Dose  1/21 1  --  1/30  --  5 mg    Assessment/ Plan: Will order warfarin 5 mg PO x 1 dose. Pharmacy will continue to monitor daily and adjust therapy as indicated.

## 2020-01-30 NOTE — ANESTHESIA PROCEDURE NOTES
March 8, 2017      Kim Tuttle  [address] [place] [address]          To whom it may concern,     Kim Tuttle was seen in clinic. Please excuse her on 03/06/2017 and 03/07/2017 due to post-concussion syndrome.       Sincerely,    Dr. Warren    Peripheral Block    Performed by: Chidi Palma MD  Authorized by: Chidi Palma MD       Pre-procedure: Indications: at surgeon's request and post-op pain management    Preanesthetic Checklist: patient identified, risks and benefits discussed, site marked, timeout performed, anesthesia consent given and patient being monitored      Block Type:   Block Type:   Adductor canal  Laterality:  Right  Monitoring:  Standard ASA monitoring, responsive to questions, continuous pulse ox, frequent vital sign checks, heart rate and oxygen  Injection Technique:  Single shot  Procedures: ultrasound guided    Patient Position: supine  Prep: alcohol    Location:  Mid thigh  Needle Gauge:  21 G  Needle Localization:  Ultrasound guidance    Assessment:    Injection Assessment:  Incremental injection every 5 mL, local visualized surrounding nerve on ultrasound, negative aspiration for blood, no intravascular symptoms, ultrasound image on chart, no paresthesia and negative aspiration for CSF  Patient tolerance:  Patient tolerated the procedure well with no immediate complications

## 2020-01-30 NOTE — PROGRESS NOTES
Problem: Mobility Impaired (Adult and Pediatric)  Goal: *Acute Goals and Plan of Care (Insert Text)  Description  FUNCTIONAL STATUS PRIOR TO ADMISSION: Patient was independent and active without use of DME.    HOME SUPPORT PRIOR TO ADMISSION: The patient lived alone with no local support. Works full time. Physical Therapy Goals  Initiated 1/30/2020    1. Patient will move from supine to sit and sit to supine , scoot up and down and roll side to side in bed with modified independence within 4 days. 2. Patient will perform sit to stand with modified independence within 4 days. 3. Patient will ambulate with modified independence for 150 feet with the least restrictive device within 4 days. 4. Patient will ascend/descend 4 stairs with cane and one handrail with modified independence within 4 days. 5. Patient will perform home exercise program per protocol with independence within 4 days. 6. Patient will demonstrate AROM 0-90 degrees in operative joint within 4 days. Outcome: Progressing Towards Goal   PHYSICAL THERAPY EVALUATION  Patient: Georgina Pickett (89 y.o. female)  Date: 1/30/2020  Primary Diagnosis: OA (osteoarthritis) of knee [M17.10]  Osteoarthrosis of knee [M17.10]  Procedure(s) (LRB):  RIGHT TOTAL KNEE ARTHROPLASTY (Right) Day of Surgery   Precautions:   Fall, WBAT      ASSESSMENT  Based on the objective data described below, the patient presents with  impairment in functional mobility, activity tolerance and balance s/p R TKA. PLOF: Independent with ADLs and IADLs, works full time. Lives alone in one story home (one step-down to great room), 4 steps with rails to enter. Patient's mobility was on target for POD#0. Will address more exercises, increase gait distance, negotiate stairs and assess for discharge at am PT session tomorrow. Patient instructed NOT to get up from bed, chair or commode without calling for assistance.  She will benefit from [facility] Renaldo Ortiz PT in order to achieve maximum level of safe, functional mobility, balance and return to independent PLOF. Initiated post-TKA exercise protocol. Current Level of Function Impacting Discharge (mobility/balance): All mobility performed with contact guard assistance. Ambulated 35 ft with RW and gait belt. Functional Outcome Measure: The patient scored 45/100 on the Barthel outcome measure which is indicative of moderate dependence on caregivers for assistance with mobility and ADLs. Other factors to consider for discharge: Lives alone/Motivated/A & O x 4/Independent PLOF      Patient will benefit from skilled therapy intervention to address the above noted impairments. PLAN :  Recommendations and Planned Interventions: bed mobility training, transfer training, gait training, therapeutic exercises, patient and family training/education, and therapeutic activities      Frequency/Duration: Patient will be followed by physical therapy:  twice daily to address goals. Recommendation for discharge: (in order for the patient to meet his/her long term goals)  Physical therapy at least 2 days/week in the home     This discharge recommendation:  Has been made in collaboration with the attending provider and/or case management    IF patient discharges home will need the following DME: patient owns DME required for discharge         SUBJECTIVE:   Patient stated I am ready to get up.     OBJECTIVE DATA SUMMARY:   HISTORY:    Past Medical History:   Diagnosis Date    Achilles tendon tear     GERD (gastroesophageal reflux disease)     Osteoarthritis     Varicose vein of leg      Past Surgical History:   Procedure Laterality Date    HX APPENDECTOMY  1984    HX BACK SURGERY  2019    LUMBAR FUSION L3-5    HX BREAST BIOPSY Right     FATTY LUMP - BENIGN    HX CYSTOCELE REPAIR      HX HYSTERECTOMY      HX KNEE ARTHROSCOPY Left     MENISCUS REPAIR    HX ORTHOPAEDIC      STEM CELL INJECTIONS (21) FOR LT. ANKLE WOUND    HX ORTHOPAEDIC Left ACHILLES TENDON REPAIR, DIDN'T WORK    HX RECTOCELE REPAIR      HX UROLOGICAL      CYSTOCELE, RECTOCELE REPAIR    HX WRIST FRACTURE TX Right        Personal factors and/or comorbidities impacting plan of care: Lives alone/Motivated/A & O x 4/Independent PLOF     Home Situation  Home Environment: Private residence  # Steps to Enter: 4  Rails to Enter: Yes  Hand Rails : Bilateral  Wheelchair Ramp: No  One/Two Story Residence: One story  Living Alone: Yes  Support Systems: Family member(s), Home care staff  Current DME Used/Available at Home: Cane, straight, Raised toilet seat, Shower chair, Walker, rolling  Tub or Shower Type: Tub/Shower combination    EXAMINATION/PRESENTATION/DECISION MAKING:   Critical Behavior:        Range Of Motion:  AROM: Generally decreased, functional(AROM 0-90)           PROM: Generally decreased, functional           Strength:    Strength: Generally decreased, functional                    Tone & Sensation:   Tone: Normal              Sensation: Intact               Coordination:  Coordination: Within functional limits  Vision:      Functional Mobility:  Bed Mobility:     Supine to Sit: Contact guard assistance  Sit to Supine: Contact guard assistance  Scooting: Contact guard assistance  Transfers:  Sit to Stand: Contact guard assistance  Stand to Sit: Contact guard assistance                       Balance:   Sitting: Intact  Standing: Intact; With support  Ambulation/Gait Training:  Distance (ft): 35 Feet (ft)  Assistive Device: Walker, rolling;Gait belt  Ambulation - Level of Assistance: Contact guard assistance        Gait Abnormalities: Antalgic  Right Side Weight Bearing: As tolerated     Base of Support: Widened;Shift to left  Stance: Right decreased(some R knee buckling)  Speed/Leatha: Slow  Step Length: Left shortened  Swing Pattern: Right asymmetrical                 Therapeutic Exercises:    Ankle Pumps  Quad sets (5 second hold)  X 10 reps every hour   Heel slides x 10 Functional Measure:  Barthel Index:    Bathin  Bladder: 10  Bowels: 10  Groomin  Dressin  Feeding: 10  Mobility: 0  Stairs: 0  Toilet Use: 5  Transfer (Bed to Chair and Back): 10  Total: 45/100       The Barthel ADL Index: Guidelines  1. The index should be used as a record of what a patient does, not as a record of what a patient could do. 2. The main aim is to establish degree of independence from any help, physical or verbal, however minor and for whatever reason. 3. The need for supervision renders the patient not independent. 4. A patient's performance should be established using the best available evidence. Asking the patient, friends/relatives and nurses are the usual sources, but direct observation and common sense are also important. However direct testing is not needed. 5. Usually the patient's performance over the preceding 24-48 hours is important, but occasionally longer periods will be relevant. 6. Middle categories imply that the patient supplies over 50 per cent of the effort. 7. Use of aids to be independent is allowed. Brionna Winston., Barthel, D.W. ([id number]). Functional evaluation: the Barthel Index. [facility] (14)2. Inge Rodriguez wendy JIMBO Rice, Lolly Guo., Manolo Junior., [place], [address] [place] [address] (). Measuring the change indisability after inpatient rehabilitation; comparison of the responsiveness of the Barthel Index and Functional [place]anan Measure. Journal of Neurology, Neurosurgery, and Psychiatry, 66(4), 227-931. Nuno Peralta, N.J.A, KELLY Villegas.NERI, & Hilary Pinzon MEUNICE. (2004.) Assessment of post-stroke quality of life in cost-effectiveness studies: The usefulness of the Barthel Index and the EuroQoL-5D.  Quality of Life Research, 15, 139-88           Physical Therapy Evaluation Charge Determination   History Examination Presentation Decision-Making   LOW Complexity : Zero comorbidities / personal factors that will impact the outcome / POC LOW Complexity : 1-2 Standardized tests and measures addressing body structure, function, activity limitation and / or participation in recreation  LOW Complexity : Stable, uncomplicated  LOW Complexity : FOTO score of       Based on the above components, the patient evaluation is determined to be of the following complexity level: LOW     Pain Ratin/10    Activity Tolerance:   Good  Please refer to the flowsheet for vital signs taken during this treatment. Vitals:    20 1354 20 1420 20 1425 20 1535   BP: 146/71 124/72 138/90 105/62   BP 1 Location: Right arm Left arm Left arm Left arm   BP Patient Position: At rest Supine; Head of bed elevated (Comment degrees) Sitting    Pulse: 74 73 72 84   Resp: 14   14   Temp: 98.4 °F (36.9 °C)   98.4 °F (36.9 °C)   SpO2: 96%   95%   Weight:            After treatment patient left in no apparent distress:   Supine in bed, Call bell within reach, Side rails x 3, and nurse notified. COMMUNICATION/EDUCATION:   The patients plan of care was discussed with: Registered Nurse and Physician. Fall prevention education was provided and the patient/caregiver indicated understanding., Patient/family have participated as able in goal setting and plan of care. , and Patient/family agree to work toward stated goals and plan of care.     Thank you for this referral.  Israel Marinelli   Time Calculation: 35 mins

## 2020-01-30 NOTE — H&P
JOSEPH PRE-OP HISTORY AND PHYSICAL    Subjective:     Patient is a 72 y.o. female presented with a history of right knee pain. Onset of symptoms was gradual with gradually worsening course since that time. She is being admitted for surgical management of this condition. Patient Active Problem List    Diagnosis Date Noted    S/P lumbar spinal fusion 10/29/2018    Lumbar stenosis 10/25/2018    Spinal stenosis, lumbar 10/25/2018    Achilles tendon tear 05/01/2015     Past Medical History:   Diagnosis Date    Achilles tendon tear     GERD (gastroesophageal reflux disease)     Osteoarthritis     Varicose vein of leg       Past Surgical History:   Procedure Laterality Date    HX APPENDECTOMY  1984    HX BACK SURGERY  2019    LUMBAR FUSION L3-5    HX BREAST BIOPSY Right     FATTY LUMP - BENIGN    HX CYSTOCELE REPAIR      HX HYSTERECTOMY      HX KNEE ARTHROSCOPY Left     MENISCUS REPAIR    HX ORTHOPAEDIC      STEM CELL INJECTIONS (21) FOR LT. ANKLE WOUND    HX ORTHOPAEDIC Left     ACHILLES TENDON REPAIR, DIDN'T WORK    HX RECTOCELE REPAIR      HX UROLOGICAL      CYSTOCELE, RECTOCELE REPAIR    HX WRIST FRACTURE TX Right       Prior to Admission medications    Medication Sig Start Date End Date Taking? Authorizing Provider   clobetasoL (TEMOVATE) 0.05 % ointment Apply  to affected area daily as needed for Skin Irritation. Yes Provider, Historical   ibuprofen (ADVIL) 200 mg tablet Take 600 mg by mouth. Yes Provider, Historical   gabapentin (NEURONTIN) 300 mg capsule Take 300 mg by mouth three (3) times daily as needed. Yes Provider, Historical   famotidine (PEPCID AC) 20 mg tablet Take 20 mg by mouth daily as needed. Yes Provider, Historical   bisacodyl (DULCOLAX) 10 mg suppository Insert 10 mg into rectum daily as needed.  10/31/18   Sridhar Mistry NP     Allergies   Allergen Reactions    Oxycodone Other (comments)     \"makes me constipated\"    Tramadol Hives      Social History Tobacco Use    Smoking status: Former Smoker     Packs/day: 0.25     Years: 45.00     Pack years: 11.25     Last attempt to quit: 3/10/2018     Years since quittin.8    Smokeless tobacco: Never Used    Tobacco comment: 6 CIGARETTES A DAY WHEN SMOKED   Substance Use Topics    Alcohol use: Yes     Comment: rare      Family History   Problem Relation Age of Onset    Hypertension Mother     Stroke Mother     Cancer Mother         THYROID CA    Cancer Father         ? BRAIN    Hypertension Maternal Grandmother     Diabetes Maternal Grandfather     Hypertension Maternal Grandfather     Hypertension Paternal Grandmother     Hypertension Paternal Grandfather     Anesth Problems Neg Hx       Review of Systems  A comprehensive review of systems was negative except for that written in the HPI. Objective:     Patient Vitals for the past 8 hrs:   BP Temp Pulse Resp SpO2 Weight   20 0628 119/64 97.6 °F (36.4 °C) 60 18 96 % 96.6 kg (213 lb)     Visit Vitals  /64 (BP 1 Location: Right arm, BP Patient Position: At rest;Head of bed elevated (Comment degrees))   Pulse 60   Temp 97.6 °F (36.4 °C)   Resp 18   Wt 96.6 kg (213 lb)   SpO2 96%   BMI 38.96 kg/m²     General:  Alert, cooperative, no distress, appears stated age. Head:  Normocephalic, without obvious abnormality, atraumatic. Eyes:  Conjunctivae/corneas clear. PERRL, EOMs intact. Fundi benign. Ears:  Normal TMs and external ear canals both ears. Nose: Nares normal. Septum midline. Mucosa normal. No drainage or sinus tenderness. Throat: Lips, mucosa, and tongue normal. Teeth and gums normal.   Neck: Supple, symmetrical, trachea midline, no adenopathy, thyroid: no enlargement/tenderness/nodules, no carotid bruit and no JVD. Back:   Symmetric, no curvature. ROM normal. No CVA tenderness. Lungs:   Clear to auscultation bilaterally. Chest wall:  No tenderness or deformity.    Heart:  Regular rate and rhythm, S1, S2 normal, no murmur, click, rub or gallop. Abdomen:   Soft, non-tender. Bowel sounds normal. No masses,  No organomegaly. Extremities: Right knee: pain with limited ROM. Pulses: 2+ and symmetric all extremities. Skin: Skin color, texture, turgor normal. No rashes or lesions. Lymph nodes: Cervical, supraclavicular, and axillary nodes normal.   Neurologic: CNII-XII intact. Normal strength, sensation and reflexes throughout. Assessment:     Active Problems:    * No active hospital problems. *      Plan:     The various methods of treatment have been discussed with the patient and family. After consideration of risks, benefits and other options for treatment, the patient has consented to surgical intervention. Risks of infection, DVT, PE, MI, CVA and unforeseen events described to the patient. Additionally discussed the possibility of not being able to resolve all preop pain. Patient understands metal and plastic will be implanted in the body and understands the potential for revision surgery. Patient wishes to proceed with elective surgery.

## 2020-01-30 NOTE — PERIOP NOTES
TRANSFER - OUT REPORT:    Verbal report given to Pieter(name) on Quincy Hobson  being transferred to 572(unit) for routine post - op       Report consisted of patients Situation, Background, Assessment and   Recommendations(SBAR). Time Pre op antibiotic [id number]  Anesthesia Stop time: 1034  [place] Present on Transfer to [address] for [place] on Chart:NO  Discharge Prescriptions with Chart:NO    Information from the following report(s) Procedure Summary and Accordion was reviewed with the receiving nurse. Opportunity for questions and clarification was provided. Is the patient on 02? N    Is the patient on a monitor? NO    Is the nurse transporting with the patient? NO    Surgical Waiting Area notified of patient's transfer from PACU? YES      The following personal items collected during your admission accompanied patient upon transfer:   Dental Appliance: Dental Appliances: None  Vision:    Hearing Aid:    Jewelry: Jewelry: None  Clothing: Clothing: Other (comment)(clothes and shoes to pacu)  Other Valuables:  Other Valuables: Eyeglasses(glasses to pacu)  Valuables sent to safe:    CANE, GLASSES, AND BAG OF CLOTHING RETURNED TO PT INM PACU

## 2020-01-30 NOTE — ANESTHESIA POSTPROCEDURE EVALUATION
Procedure(s):  RIGHT TOTAL KNEE ARTHROPLASTY. general, regional    Anesthesia Post Evaluation      Multimodal analgesia: multimodal analgesia used between 6 hours prior to anesthesia start to PACU discharge  Patient location during evaluation: bedside  Patient participation: waiting for patient participation  Level of consciousness: awake  Pain management: adequate  Airway patency: patent  Anesthetic complications: no  Cardiovascular status: acceptable  Respiratory status: unassisted  Hydration status: acceptable  Comments: Post-Anesthesia Evaluation and Assessment    I have evaluated the patient and they are ready for PACU discharge. Patient: Pieter Greenberg MRN: [id number]  SSN: [id number]   YOB: 1950  Age: 72 y.o. Sex: female      Cardiovascular Function/Vital Signs  /55   Pulse 75   Temp 36.7 °C (98 °F)   Resp 14   Wt 96.6 kg (213 lb)   SpO2 98%   BMI 38.96 kg/m²     Patient is status post General anesthesia for Procedure(s):  RIGHT TOTAL KNEE ARTHROPLASTY. Nausea/Vomiting: None    Postoperative hydration reviewed and adequate. Pain:  Pain Scale 1: Numeric (0 - 10) (01/30/20 1652)  Pain Intensity 1: 0 (01/30/20 4058)   Managed    Neurological Status:   Neuro (WDL): Exceptions to WDL (01/30/20 0631)  Neuro  RLE Motor Response: Numbness;Tingling (01/30/20 0631)   At baseline    Mental Status, Level of Consciousness: Alert and  oriented to person, place, and time    Pulmonary Status:   O2 Device: Nasal cannula (01/30/20 1039)   Adequate oxygenation and airway patent    Complications related to anesthesia: None    Post-anesthesia assessment completed. No concerns    Signed By: Radha Herndon MD    January 30, 2020                   Vitals Value Taken Time   /55 1/30/2020 10:39 AM   Temp 36.7 °C (98 °F) 1/30/2020 10:39 AM   Pulse 78 1/30/2020 10:40 AM   Resp 15 1/30/2020 10:40 AM   SpO2 98 % 1/30/2020 10:40 AM   Vitals shown include unvalidated device data.

## 2020-01-30 NOTE — PROGRESS NOTES
Ortho Post-Op Note    1/30/2020  3:59 PM    POD:  Day of Surgery  S/P:  Procedure(s):  RIGHT TOTAL KNEE ARTHROPLASTY    Afebrile/VSS, NAD, A&O x3  Doing well without complaints of nausea  Pain well controlled  Calves soft/NTTP Bilaterally  Leg soft. Dressing clean and dry  Moving lower extremities well. Neurocirculatory exam intact and within normal range. Lab Results   Component Value Date/Time    HGB 12.8 01/21/2020 09:35 AM    INR 1.0 01/21/2020 09:35 AM     Recent Labs     01/21/20  0935   CREA 0.75   BUN 21*     Estimated Creatinine Clearance: 76.8 mL/min (by C-G formula based on SCr of 0.75 mg/dL). PLAN:  DVT prophylaxis: Warfarin  WBAT with PT-mobilization  Pain Control: tylenol.  Oxycodone, gabapentin  Plan to D/C Home with HH/PT in 1-2 days      [place], [place]

## 2020-01-30 NOTE — OP NOTES
[address]   OPERATIVE REPORT    Name:  Harvey Darby  MR#:  [id number]  :  1950  ACCOUNT #:  [de-identified]  DATE OF SERVICE:  2020      PREOPERATIVE DIAGNOSIS:  End-stage osteoarthritis of the right knee with valgus deformity. POSTOPERATIVE DIAGNOSIS:  End-stage osteoarthritis of the right knee with valgus deformity    PROCEDURE PERFORMED:  Right total knee arthroplasty. SURGEON:  Kelsy Hannah MD    ASSISTANT:  Reynold Madrid DO    ANESTHESIA:  General with 266 mg of Exparel. COMPLICATIONS:  Zero. SPECIMENS REMOVED:  Bone, discarded. IMPLANTS:  Exactech Truliant knee size 4 tibia, size 4 femur with a 9-mm posterior-stabilized poly and a 32 patella. All components cemented. ESTIMATED BLOOD LOSS:  150 mL. IV FLUIDS:  Crystalloids were given. PREOPERATIVE MEDICATIONS:  2 g of Ancef. HISTORY:  The patient is a 58-year-old who I have seen in the office multiple times. She has valgus deformity, recurrent pain, and effusion lateral base of her knee. We entertained conservative treatment for quite sometime to the point where she was having an increase in her discomfort and a significant decline in her ADLs. Her x-rays revealed bone-on-bone arthritis. To that effect, we had a conversation of total knee replacement. I went over the surgery, the technique, and the expected recovery. I explained to the patient there is no guarantee all of her symptoms would be eliminated. She understood metal and plastic will be implanted. She understood the potential for revision surgery as well. We talked about generalized associated risk of infection, DVT, PE, MI, CVA, and other unforeseen events could occur in a perioperative fashion. Understanding all the risks and benefits as mentioned above, the patient contemplated her options and wished to proceed. Received preoperative medical clearance and signed the consent for surgery.     OPERATIVE PLAN:  The patient was taken back to surgery and placed supine on the operating table. Anesthesiologist attempted a spinal anesthetic but this was unsuccessful. The patient was placed in the supine position with all bony prominences well protected. Tourniquet was applied around the right upper thigh. The patient was administered general anesthetic with endotracheal intubation. 2 g of Ancef was administered intravenously. A sterile prep and drape were performed on the right lower extremity. Time-out was performed. All parties agreed the right knee was the correct knee. Following the time-out, an Esmarch was used to exsanguinate the extremity. Tourniquet was inflated to 300 mmHg. An anterior midline incision was made with a 10-blade. Sharp dissection was taken down to the skin and blunt dissection was taken down the extensor mechanism. A standard medial parapatellar arthrotomy was performed. Anteromedial dissection was performed along the tibia. The portion of the fat pad was removed. The patella was everted. The patella was incised. A freehand resection of the patella was performed with bone saw. This measured to be 32 and appropriate drill holes were placed in the patella. At that time, the outrigger for the GPS system was applied to the superior lateral ridge of the distal femur. We then referenced all the salient points on the femur. We then placed the outrigger on the tibia and used the computer to reference all the salient points on the tibia. The computer came up with a size 4.5 or 4. We adjusted this to accommodate the 4 and the appropriate distal cut and posterior cut seemed to match. At that time, the outrigger on the femur was deployed and held in place with bone pegs. We then referenced on the computer to create the right distal cut depth as well as varus-valgus angulation. The distal cut was then attached with bone screws and the cut was made respectively. The ACL and the PCL were then removed. We then placed the 4-in-1 cutting block into place and the cuts were made respectively. We then placed the outrigger on the tibia and used the computer reference varus-valgus angulation and depth of cut. The cut block was held in place with bone screws. We checked our cut, it was appropriate, and the cut was made with a bone saw. Proximal tibia was removed. We checked our extension flexion gaps. They seemed to be appropriate for 9. The posterior compartment was debrided off any meniscal tissue or bony debris. Exparel was injected in the posterior compartment. We then placed a size 4 femoral trial in the femur. The notch was then reamed. We then trialed the knee with a 9 and this was appropriate with great range of motion and stability. Trial components were removed. We sized the tibia to be a size 4. It was then drilled and broached in standard fashion. Bone stock was pulsatile irrigated. The bone stock was dried. Antibiotic-impregnated cement was mixed in the back table. We cemented the tibia followed by the femur. A 9-mm trial was inserted and compressed. Any excess cement was removed. We then cemented the patella. The rest of the Exparel was injected through the anterior compartment of the knee. After final curing of the cement, we placed the knee through a range of motion. Once again, it had great range of motion and stability. The trial was removed. The knee was cleansed with sterile liquid. The final size 9 was inserted. The knee was reduced. Tourniquet was let down. Bleeding was controlled with electrocautery. Wound was copiously irrigated and dried. #2 Vicryl was used to close the superior portion of the quad and Stratafix was used to close the distal portion. 2-0 Vicryl was placed in the subcutaneous tissue and running 4-0 Monocryl was placed in the epidermis. Steri-Strips, Adaptic, and bacitracin were applied over the incision.   The patient was awakened from general anesthetic in stable condition and transferred to the recovery room.         MD ANGELO Rosario/[email or web address]  D:  01/30/2020 10:19  T:  01/30/2020 14:09  JOB #:  [id number]

## 2020-01-30 NOTE — BRIEF OP NOTE
BRIEF OPERATIVE NOTE    Date of Procedure: 1/30/2020   Preoperative Diagnosis: OA RIGHT KNEE  Postoperative Diagnosis: OSTEOARTHRITIS RIGHT KNEE    Procedure(s):  RIGHT TOTAL KNEE ARTHROPLASTY  Surgeon(s) and Role:     Natty Preciado MD - Primary     * Leanne Villegas DO - Resident - Assisting         Surgical Assistant: tech    Surgical Staff:  Circ-1: Mariposa Berry RN  Circ-Relief: Ana Ring RN  Scrub Tech-1: Finas Creed  Surg Asst-1: Prospect Dilling  Event Time In Time Out   Incision Start 7517    Incision Close       Anesthesia: General with block  Estimated Blood Loss: 150  Specimens:bone discarded  Findings: oa   Complications: none  Implants:   Implant Name Type Inv.  Item Serial No.  Lot No. LRB No. Used Action   CEMENT BNE GENTAMC CEMEX 80GM -- 1500/SG US - SN/A  CEMENT BNE GENTAMC CEMEX 80GM -- 1500/SG US N/A [employer] [id number] Right 1 Implanted   INSERT 3-PEG PAT 32MM --  - [id number] Joint Component INSERT 3-PEG PAT 32MM --  [id number] [employer]  Right 1 Implanted   FEM PS SEEMA RT SZ 4 -- TRULIANT - [id number] Joint Component FEM PS SEEMA RT SZ 4 -- TRULIANT  [employer]  Right 1 Implanted   TRULIANT FIT TIBIAL TRAY Joint Component  [id number] [employer]  Right 1 Implanted   TRULIANT TIBIAL INSERT POSTERIOR STABILIZED   [id number] [employer] N/A Right 1 Implanted

## 2020-01-31 VITALS
OXYGEN SATURATION: 95 % | TEMPERATURE: 97.9 F | SYSTOLIC BLOOD PRESSURE: 110 MMHG | HEART RATE: 74 BPM | DIASTOLIC BLOOD PRESSURE: 55 MMHG | WEIGHT: 213 LBS | BODY MASS INDEX: 38.96 KG/M2 | RESPIRATION RATE: 12 BRPM

## 2020-01-31 LAB
HGB BLD-MCNC: 9.7 G/DL (ref 11.5–16)
INR PPP: 1.1 (ref 0.9–1.1)
PROTHROMBIN TIME: 10.8 SEC (ref 9–11.1)

## 2020-01-31 PROCEDURE — 74011250637 HC RX REV CODE- 250/637: Performed by: ORTHOPAEDIC SURGERY

## 2020-01-31 PROCEDURE — 74011250636 HC RX REV CODE- 250/636: Performed by: ORTHOPAEDIC SURGERY

## 2020-01-31 PROCEDURE — 85610 PROTHROMBIN TIME: CPT

## 2020-01-31 PROCEDURE — 85018 HEMOGLOBIN: CPT

## 2020-01-31 PROCEDURE — 36415 COLL VENOUS BLD VENIPUNCTURE: CPT

## 2020-01-31 PROCEDURE — 97116 GAIT TRAINING THERAPY: CPT

## 2020-01-31 RX ORDER — WARFARIN 2.5 MG/1
2.5 TABLET ORAL DAILY
Qty: 60 TAB | Refills: 3 | Status: SHIPPED | OUTPATIENT
Start: 2020-01-31

## 2020-01-31 RX ORDER — OXYCODONE HYDROCHLORIDE 5 MG/1
10 TABLET ORAL
Qty: 50 TAB | Refills: 0 | Status: SHIPPED | OUTPATIENT
Start: 2020-01-31 | End: 2020-02-03

## 2020-01-31 RX ORDER — WARFARIN SODIUM 5 MG/1
5 TABLET ORAL ONCE
Status: COMPLETED | OUTPATIENT
Start: 2020-01-31 | End: 2020-01-31

## 2020-01-31 RX ADMIN — ACETAMINOPHEN 650 MG: 325 TABLET ORAL at 08:37

## 2020-01-31 RX ADMIN — WARFARIN SODIUM 5 MG: 5 TABLET ORAL at 11:38

## 2020-01-31 RX ADMIN — KETOROLAC TROMETHAMINE 15 MG: 30 INJECTION, SOLUTION INTRAMUSCULAR at 04:11

## 2020-01-31 RX ADMIN — POLYETHYLENE GLYCOL 3350 17 G: 17 POWDER, FOR SOLUTION ORAL at 08:37

## 2020-01-31 RX ADMIN — Medication 2 G: at 00:10

## 2020-01-31 RX ADMIN — GABAPENTIN 300 MG: 300 CAPSULE ORAL at 00:09

## 2020-01-31 RX ADMIN — KETOROLAC TROMETHAMINE 15 MG: 30 INJECTION, SOLUTION INTRAMUSCULAR at 09:46

## 2020-01-31 RX ADMIN — Medication 10 ML: at 07:16

## 2020-01-31 RX ADMIN — GABAPENTIN 300 MG: 300 CAPSULE ORAL at 08:37

## 2020-01-31 RX ADMIN — SENNOSIDES AND DOCUSATE SODIUM 1 TABLET: 8.6; 5 TABLET ORAL at 08:37

## 2020-01-31 RX ADMIN — KETOROLAC TROMETHAMINE 15 MG: 30 INJECTION, SOLUTION INTRAMUSCULAR at 00:09

## 2020-01-31 RX ADMIN — SODIUM CHLORIDE 125 ML/HR: 900 INJECTION, SOLUTION INTRAVENOUS at 00:09

## 2020-01-31 RX ADMIN — FAMOTIDINE 20 MG: 20 TABLET ORAL at 08:36

## 2020-01-31 NOTE — PROGRESS NOTES
Orthopedic Joint Progress Note    2020  Admit Date: 2020  Admit Diagnosis: OA (osteoarthritis) of knee [M17.10]  Osteoarthrosis of knee [M17.10]    1 Day Post-Op    Subjective:     Idaglia Niño Pain is controlled. Denies CP, SOB, Numbness. Review of Systems: A review of systems was negative. Objective:     PT/OT:     PATIENT MOBILITY    Bed Mobility  Supine to Sit: Contact guard assistance  Sit to Supine: Contact guard assistance  Scooting: Contact guard assistance  Transfers  Sit to Stand: Contact guard assistance  Stand to Sit: Contact guard assistance  Toilet Transfers : Contact guard assistance      Gait  Base of Support: Widened, Shift to left  Speed/Leatha: Slow  Step Length: Left shortened  Swing Pattern: Right asymmetrical  Stance: Right decreased(some R knee buckling)  Gait Abnormalities: Antalgic  Ambulation - Level of Assistance: Contact guard assistance  Distance (ft): 35 Feet (ft)  Assistive Device: Walker, rolling, Gait belt   Weight Bearing Status  Right Side Weight Bearing: As tolerated        Vital Signs:    Blood pressure 113/77, pulse 66, temperature 98.3 °F (36.8 °C), resp. rate 14, weight 96.6 kg (213 lb), SpO2 96 %.   Temp (24hrs), Av.3 °F (36.8 °C), Min:98 °F (36.7 °C), Max:98.4 °F (36.9 °C)      Pain Control:   Pain Assessment  Pain Scale 1: Numeric (0 - 10)  Pain Intensity 1: 2    Meds:  Current Facility-Administered Medications   Medication Dose Route Frequency    gabapentin (NEURONTIN) capsule 300 mg  300 mg Oral TID    0.9% sodium chloride infusion  125 mL/hr IntraVENous CONTINUOUS    sodium chloride 0.9 % bolus infusion 500 mL  500 mL IntraVENous ONCE PRN    sodium chloride (NS) flush 5-40 mL  5-40 mL IntraVENous Q8H    sodium chloride (NS) flush 5-40 mL  5-40 mL IntraVENous PRN    naloxone (NARCAN) injection 0.4 mg  0.4 mg IntraVENous PRN    senna-docusate (PERICOLACE) 8.6-50 mg per tablet 1 Tab  1 Tab Oral BID    polyethylene glycol (MIRALAX) packet 17 g  17 g Oral DAILY    [START ON 2/1/2020] bisacodyL (DULCOLAX) suppository 10 mg  10 mg Rectal DAILY PRN    acetaminophen (TYLENOL) tablet 650 mg  650 mg Oral Q6H PRN    oxyCODONE IR (ROXICODONE) tablet 5 mg  5 mg Oral Q3H PRN    oxyCODONE IR (ROXICODONE) tablet 10 mg  10 mg Oral Q3H PRN    ketorolac (TORADOL) injection 15 mg  15 mg IntraVENous Q6H    HYDROmorphone (PF) (DILAUDID) injection 0.5 mg  0.5 mg IntraVENous Q4H PRN    ondansetron (ZOFRAN) injection 4 mg  4 mg IntraVENous Q4H PRN    hydroxyzine HCL (ATARAX) tablet 10 mg  10 mg Oral Q8H PRN    famotidine (PEPCID) tablet 20 mg  20 mg Oral BID    Warfarin - Pharmacy dosing   Other Rx Dosing/Monitoring        LAB:    Lab Results   Component Value Date/Time    INR 1.1 01/31/2020 04:13 AM    INR 1.0 01/21/2020 09:35 AM    INR 1.0 10/10/2018 12:36 PM     Lab Results   Component Value Date/Time    HGB 9.7 (L) 01/31/2020 04:13 AM    HGB 12.8 01/21/2020 09:35 AM    HGB 9.1 (L) 10/30/2018 02:52 AM       Wound Back Lower (Active)   Number of days: 463       Wound Back Lower;Mid (Active)   Number of days: 463       Wound Knee Right Incision x1 (Active)   Dressing Status Clean, dry, and intact 1/30/2020  4:09 PM   Dressing Type 4 x 4;ABD pad;Cast padding;Elastic bandage 1/30/2020  4:09 PM   Drainage Amount None 1/30/2020  4:09 PM   Wound Odor None 1/30/2020  4:09 PM   Number of days: 1         Physical Exam:  General: Awake, alert, NAD  RLE: Dressing clean, dry, intact. SILT SP/DP/Tibial nerves. Toes pink and warm with BCR. +TA/EHL/GS motor. Assessment:      Active Problems:    OA (osteoarthritis) of knee (1/30/2020)      Osteoarthrosis of knee (1/30/2020)         Plan:     Continue PT/OT/Rehab  Consult: Rehab team including PT, OT, recreational therapy, and      Patient status post right TKA on 1/30. Doing well. Hgb 9.7 today.     - WBAT RLE   - PT/OT   - Analgesia PRN   - DVT Prophylaxis: SCD, Coumadin   - Likely discharge home today

## 2020-01-31 NOTE — PROGRESS NOTES
I have reviewed discharge instructions with the patient. The patient verbalized understanding. Patient had no concerns or questions regarding discharge instructions or join discharge video. Patients belongings gathered including clothing, ice packs, incentive spirometer and prescriptions. Patient wheeled down to patient discharge where her friend met her.

## 2020-01-31 NOTE — DISCHARGE INSTRUCTIONS
[facility]. Total Knee Replacement   Post-Op Discharge Instructions Knee   Dr. Ian Sal    Patient Name  El Song  Date of procedure  1/30/2020    Procedure  Procedure(s):  RIGHT TOTAL KNEE ARTHROPLASTY  Surgeon  Surgeon(s) and Role:     Judie Coombs MD - Primary     * Kristen Calle DO - Resident - Assisting  PCP: Verenice Navarro MD    Follow up care   Follow up visit with Dr. Re Oneal in 2-3 weeks. Call [phone], extension [phone] to make an appointment    Activity at home   Take a short walk every hour; except at night when sleeping   Do your Home Exercise Program 3 times every day    After exercising lie down and elevate your leg on pillows for 15-30 minutes to decrease swelling   Refer to your patient notebook for more information   Preventing blood clots   Take Warfarin (Coumadin) every day as prescribed.  Do not take aspirin or anti-inflammatory medicine while taking Warfarin   Wear elastic stockings (TEDS) for 4 weeks. You may remove them daily for 1 hour when shoering/sponge-bathing.  Call Dr. Re Oneal if you have side effects of blood thinning medication: bleeding, bruising, upset stomach, or diarrhea.  Call Dr. Re Oneal for signs of a blood clot in your leg: calf pain, tenderness, redness, swelling of lower leg   Preventing lung congestion   Use your incentive spirometer 4 times a day; do 10 repetitions each time   Remember to keep the small blue ball between the two arrows when taking a slow, deep breath   Pain Management   Get up and walk a short distance to relieve pain and stiffness.  Place ice wrap on your knee except when you are walking. The gel ice packs should be changed about every 4 hours   Elevate your leg on pillows for 15-30 minutes    If needed, take a narcotic pain pill every 4-6 hours as prescribed.  Take all medications with a small amount of food.     As your pain decreases, take the narcotics less often or take ½ of a pill   Call Dr. Jayne Meyer if you have side effects from your narcotic pain medication: itching, drowsiness, dizziness, upset stomach, dry mouth, constipation or if you medication is not relieving your pain. Diet after surgery   You may resume your normal diet. Include vegetables, fruit, whole grains, lean meats, and low-fat dairy products. Eat food high in fiber    Drink plenty of fluids, including 8 cups of water daily   Take Senokot-s twice a day to prevent constipation    Avoid after surgery   Do not take any over-the-counter medication for pain except Tylenol   Do not take more than 3000mg (3 grams) of Tylenol in 24 hours.  Do not drink alcoholic beverages   Do not smoke   Do not drive until seen for follow up appointment   Do not place frozen gel pack directly on your skin. It can cause frostbite.  Do not take a tub bath, swim or get in a hot tub for 6 weeks  Prevention of falls and safety at home   Set up an area where you can rest comfortably leaving space around furniture to allow you to walk with your walker   Keep stairs, hallways and bathrooms well lit; especially at night   Arrange for care for your pets   Keep your home free of clutter   Call Dr. Jayne Meyer at [phone] for:   Pain that is not relieved by pain medication, ice and activity   Side effects of medications   Increased/spread of bruising   Warning signs of infection:  ? persistent fever greater than 100 degrees  ? shaking or chills  ? increased redness, tenderness, swelling or drainage from incision  ? increased pain during activity or rest   Warning signs of a blood clot in your leg:  ? increased pain in your calf  ? tenderness or redness  ? increased swelling or knee, calf, ankle or foot    Call [phone] after 5pm or on a weekend.  The on call physician will return your phone call  Call your Primary Care Doctor for:    Concerns about your medical conditions such as diabetes, high blood pressure, asthma, congestive heart failure   Blood sugars greater than 180   Persistent headache or dizziness   Coughing or congestion   Constipation or diarrhea   Burning when you go to the bathroom   Abnormal heart rate (fast or slow)      Call 911 and go to the nearest hospital for:    Sudden increased shortness of breath   Sudden onset of chest pain   Difficulty breathing   Localized chest pain with coughing or taking a deep breath     Home Health Care   Physical therapy   o 3 times a week for 3 weeks. o  Routine exercises, transfers, gait training, active straight leg raises  [address]  o Draw PT/INR every Wednesday for 3 weeks. o Fax the results to Dr. Mackenzie Dempsey at [phone].   o If results are abnormal call [phone], extension [phone].

## 2020-01-31 NOTE — PROGRESS NOTES
Problem: Mobility Impaired (Adult and Pediatric)  Goal: *Acute Goals and Plan of Care (Insert Text)  Description  FUNCTIONAL STATUS PRIOR TO ADMISSION: Patient was independent and active without use of DME.    HOME SUPPORT PRIOR TO ADMISSION: The patient lived alone with no local support. Works full time. Physical Therapy Goals  Initiated 1/30/2020    1. Patient will move from supine to sit and sit to supine , scoot up and down and roll side to side in bed with modified independence within 4 days. 2. Patient will perform sit to stand with modified independence within 4 days. 3. Patient will ambulate with modified independence for 150 feet with the least restrictive device within 4 days. 4. Patient will ascend/descend 4 stairs with cane and one handrail with modified independence within 4 days. 5. Patient will perform home exercise program per protocol with independence within 4 days. 6. Patient will demonstrate AROM 0-90 degrees in operative joint within 4 days. Outcome: Resolved/Met   PHYSICAL THERAPY TREATMENT/DISCHARGE  Patient: Calvin Bonds (61 y.o. female)  Date: 1/31/2020  Diagnosis: OA (osteoarthritis) of knee [M17.10]  Osteoarthrosis of knee [M17.10]   <principal problem not specified>  Procedure(s) (LRB):  RIGHT TOTAL KNEE ARTHROPLASTY (Right) 1 Day Post-Op  Precautions: Fall, WBAT  Chart, physical therapy assessment, plan of care and goals were reviewed. ASSESSMENT  Patient continues with skilled PT services and has met acute care PT goals. Patient is cleared for discharge from PT standpoint. Educated patient on Discharge Instructions relating to PT program progression post-discharge. She demonstrated/verbalized understanding. She will benefit from [address] [place] [address] Renaldo Ortiz PT in order to achieve maximum level of safe, functional mobility, balance and return to independent PLOF. Patient is independent with post-TKA exercises.     Barthel Functional Score has improved to 95/100, indicating minimal dependence on caregivers for assistance with mobility and ADLs. Other factors to consider for discharge: Motivated/A & O x 4/Supportive Family/Independent PLOF          PLAN :  Patient will be discharged from acute skilled physical therapy at this time. Rationale for discharge:  Goals achieved    Recommendation for discharge: (in order for the patient to meet his/her long term goals)  Physical therapy at least 2 days/week in the home     This discharge recommendation:  Has been made in collaboration with the attending provider and/or case management    IF patient discharges home will need the following DME: patient owns DME required for discharge       SUBJECTIVE:   Patient stated I want to go home.     OBJECTIVE DATA SUMMARY:   Critical Behavior:   A & O x 4  Appropriate safety awareness and decision making skills. Functional Mobility Training:  Bed Mobility:     Supine to Sit: Modified independent  Sit to Supine: Modified independent  Scooting: Modified independent        Transfers:  Sit to Stand: Modified independent  Stand to Sit: Modified independent        Bed to Chair: Modified independent                    Balance:  Sitting: Intact  Standing: Intact; With support  Ambulation/Gait Training:  Distance (ft): 300 Feet (ft)  Assistive Device: Walker, rolling;Gait belt  Ambulation - Level of Assistance: Modified independent        Gait Abnormalities: Antalgic  Right Side Weight Bearing: As tolerated     Base of Support: Widened  Stance: Right decreased          Stairs:  Number of Stairs Trained: 4  Stairs - Level of Assistance: Modified independent   Rail Use: Left (left rail and folded walker (has done this previously))    Therapeutic Exercises:   Patient attended pre-op JOINT CLASS, is independent with post-op TKA exercise protocol and has same in written, illlustrated form.      Functional Measures:  Barthel Index:    Bathin  Bladder: 10  Bowels: 10  Groomin  Dressing: 10  Feeding: 10  Mobility: 15  Stairs: 10  Toilet Use: 10  Transfer (Bed to Chair and Back): 15  Total: 95/100       The Barthel ADL Index: Guidelines  1. The index should be used as a record of what a patient does, not as a record of what a patient could do. 2. The main aim is to establish degree of independence from any help, physical or verbal, however minor and for whatever reason. 3. The need for supervision renders the patient not independent. 4. A patient's performance should be established using the best available evidence. Asking the patient, friends/relatives and nurses are the usual sources, but direct observation and common sense are also important. However direct testing is not needed. 5. Usually the patient's performance over the preceding 24-48 hours is important, but occasionally longer periods will be relevant. 6. Middle categories imply that the patient supplies over 50 per cent of the effort. 7. Use of aids to be independent is allowed. Sanna Grover., Barthel, DMagaliW. (8882). Functional evaluation: the Barthel Index. [facility] (14)2. Rhiannon Gil wendy ADRIEN RiceF, Najma Christopher., Danie Tate., Delia Harada, [address] [place] [address] (). Measuring the change indisability after inpatient rehabilitation; comparison of the responsiveness of the Barthel Index and Functional Childress Measure. Journal of Neurology, Neurosurgery, and Psychiatry, 66(4), 287-979. Zacarias Zambrano, N.[place].A, JASON Villegas, & Yang Herrera MMagaliA. (2004.) Assessment of post-stroke quality of life in cost-effectiveness studies: The usefulness of the Barthel Index and the EuroQoL-5D. Quality of Life Research, 13, 427-43      Pain Ratin/10    Activity Tolerance:   Good      After treatment patient left in no apparent distress:   Sitting in chair, Call bell within reach, and nurse notified.      COMMUNICATION/COLLABORATION:   The patients plan of care was discussed with: Registered Nurse and     Tangela Sharpe   Time Calculation: 27 mins

## 2020-01-31 NOTE — PROGRESS NOTES
ANGIE: home with home health. [facility] accepted patient. Care Management Interventions  PCP Verified by CM: Yes  Mode of Transport at Discharge: Other (see comment)(family/car)  Transition of Care Consult (CM Consult): Home Health, Discharge Brian Dominguez [phone] [address]: No  Reason Outside [place]: Patient already serviced by other home care/hospice agency(patient prefers [facility])  MyChart Signup: No  Discharge Durable Medical Equipment: No  Physical Therapy Consult: Yes  Occupational Therapy Consult: No  Speech Therapy Consult: No  Current Support Network: Lives Alone, Own Home  Confirm Follow Up Transport: Family  The Plan for Transition of Care is Related to the Following Treatment Goals : home health  The Patient and/or Patient Representative was Provided with a Choice of Provider and Agrees with the Discharge Plan?: Yes  Freedom of Choice List was Provided with Basic Dialogue that Supports the Patient's Individualized Plan of Care/Goals, Treatment Preferences and Shares the Quality Data Associated with the Providers?: Yes  Discharge Location  Discharge Placement: Home with home health     Reason for Admission:  RIGHT TOTAL KNEE ARTHROPLASTY                     RUR Score:   6%                  Plan for utilizing home health:  Patient prefers [facility]. FOC placed on hard chart. Current Advanced Directive/Advance Care Plan: on file                         Transition of Care Plan:     Home with home health.     GRACIELA Leggett/CRM

## 2020-02-06 NOTE — DISCHARGE SUMMARY
Discharge Summary     Patient ID:  Keith Paredes  [id number]  72 y.o.  1950    Admit Date: 1/30/2020    Discharge Date:    Admission Diagnoses: OA (osteoarthritis) of knee [M17.10]  Osteoarthrosis of knee [M17.10]    Surgeon: Arelis Kovacs MD    Last Procedure: Procedure(s):  RIGHT TOTAL KNEE ARTHROPLASTY      Hospital Course: Normal hospital course for this procedure. Significant Diagnostic Studies: none    Discharge Diagnosis: Active Problems:    OA (osteoarthritis) of knee (1/30/2020)      Osteoarthrosis of knee (1/30/2020)         Condition on Discharge: good    Disposition:Home    Followup Care:  Discharge Condition: good  See surgical instructions  Regular Diet  Keep wound clean and dry    Follow-up Information     Follow up With Specialties Details Why Contact Info    Dayton Feliz MD Internal Medicine   [address] [facility] [place] [address] [facility]. [facility] [facility]  [phone]          [place] med list:   Discharge Medication List as of 1/31/2020  1:23 PM      START taking these medications    Details   oxyCODONE IR (ROXICODONE) 5 mg immediate release tablet Take 2 Tabs by mouth every four (4) hours as needed for Pain for up to 3 days. Max Daily Amount: 60 mg., Print, Disp-50 Tab, R-0      warfarin (COUMADIN) 2.5 mg tablet Take 1 Tab by mouth daily. , Print, Disp-60 Tab, R-3         CONTINUE these medications which have NOT CHANGED    Details   clobetasoL (TEMOVATE) 0.05 % ointment Apply  to affected area daily as needed for Skin Irritation. , Historical Med      gabapentin (NEURONTIN) 300 mg capsule Take 300 mg by mouth three (3) times daily as needed., Historical Med      famotidine (PEPCID AC) 20 mg tablet Take 20 mg by mouth daily as needed., Historical Med      bisacodyl (DULCOLAX) 10 mg suppository Insert 10 mg into rectum daily as needed. , Print, Disp-8 Suppository, R-0         STOP taking these medications       ibuprofen (ADVIL) 200 mg tablet Comments:   Reason for Stopping:                  Home Going Instructions:   Patient to follow up in one - two weeks. Notify my office if develop fever, chills, redness, unbearble pain or temp.>102. Patient to follow discharge instructions. Patient to call [phone] to set up follow up appointment.         Signed:  Maricel Quintana MD  2/6/2020  7:23 AM

## 2022-03-18 PROBLEM — Z98.1 S/P LUMBAR SPINAL FUSION: Status: ACTIVE | Noted: 2018-10-29

## 2022-03-19 PROBLEM — M48.061 LUMBAR STENOSIS: Status: ACTIVE | Noted: 2018-10-25

## 2022-03-19 PROBLEM — M17.9 OSTEOARTHROSIS OF KNEE: Status: ACTIVE | Noted: 2020-01-30

## 2022-03-19 PROBLEM — M48.061 SPINAL STENOSIS, LUMBAR: Status: ACTIVE | Noted: 2018-10-25

## 2022-03-20 PROBLEM — M17.9 OA (OSTEOARTHRITIS) OF KNEE: Status: ACTIVE | Noted: 2020-01-30

## 2022-12-14 ENCOUNTER — TRANSCRIBE ORDER (OUTPATIENT)
Dept: SCHEDULING | Age: 72
End: 2022-12-14

## 2022-12-14 DIAGNOSIS — Z78.0 ASYMPTOMATIC MENOPAUSAL STATE: ICD-10-CM

## 2022-12-14 DIAGNOSIS — Z13.820 ENCOUNTER FOR SCREENING FOR OSTEOPOROSIS: Primary | ICD-10-CM

## 2022-12-14 DIAGNOSIS — Z12.31 SCREENING MAMMOGRAM FOR BREAST CANCER: ICD-10-CM

## 2023-04-22 DIAGNOSIS — Z13.820 ENCOUNTER FOR SCREENING FOR OSTEOPOROSIS: Primary | ICD-10-CM

## 2023-04-22 DIAGNOSIS — Z78.0 ASYMPTOMATIC MENOPAUSAL STATE: ICD-10-CM

## 2023-04-23 DIAGNOSIS — Z12.31 SCREENING MAMMOGRAM FOR BREAST CANCER: Primary | ICD-10-CM

## 2023-09-27 ENCOUNTER — HOSPITAL ENCOUNTER (OUTPATIENT)
Facility: HOSPITAL | Age: 73
Discharge: HOME | End: 2023-09-30
Payer: MEDICARE

## 2023-09-27 VITALS
DIASTOLIC BLOOD PRESSURE: 72 MMHG | WEIGHT: 201.6 LBS | HEART RATE: 77 BPM | RESPIRATION RATE: 20 BRPM | HEIGHT: 62 IN | SYSTOLIC BLOOD PRESSURE: 115 MMHG | TEMPERATURE: 98 F | BODY MASS INDEX: 37.1 KG/M2

## 2023-09-27 LAB
ANION GAP SERPL CALC-SCNC: 2 MMOL/L (ref 5–15)
APPEARANCE UR: CLEAR
BACTERIA URNS QL MICRO: NEGATIVE /HPF
BASOPHILS # BLD: 0 K/UL (ref 0–0.1)
BASOPHILS NFR BLD: 1 % (ref 0–1)
BILIRUB UR QL: NEGATIVE
BUN SERPL-MCNC: 28 MG/DL (ref 6–20)
BUN/CREAT SERPL: 34 (ref 12–20)
CALCIUM SERPL-MCNC: 9.1 MG/DL (ref 8.5–10.1)
CHLORIDE SERPL-SCNC: 109 MMOL/L (ref 97–108)
CO2 SERPL-SCNC: 26 MMOL/L (ref 21–32)
COLOR UR: NORMAL
CREAT SERPL-MCNC: 0.83 MG/DL (ref 0.55–1.02)
DIFFERENTIAL METHOD BLD: NORMAL
EKG ATRIAL RATE: 59 BPM
EKG DIAGNOSIS: NORMAL
EKG P AXIS: 53 DEGREES
EKG P-R INTERVAL: 160 MS
EKG Q-T INTERVAL: 422 MS
EKG QRS DURATION: 80 MS
EKG QTC CALCULATION (BAZETT): 417 MS
EKG R AXIS: 30 DEGREES
EKG T AXIS: 38 DEGREES
EKG VENTRICULAR RATE: 59 BPM
EOSINOPHIL # BLD: 0.2 K/UL (ref 0–0.4)
EOSINOPHIL NFR BLD: 4 % (ref 0–7)
EPITH CASTS URNS QL MICRO: NORMAL /LPF
ERYTHROCYTE [DISTWIDTH] IN BLOOD BY AUTOMATED COUNT: 12.6 % (ref 11.5–14.5)
EST. AVERAGE GLUCOSE BLD GHB EST-MCNC: 120 MG/DL
GLUCOSE SERPL-MCNC: 131 MG/DL (ref 65–100)
GLUCOSE UR STRIP.AUTO-MCNC: NEGATIVE MG/DL
HBA1C MFR BLD: 5.8 % (ref 4–5.6)
HCT VFR BLD AUTO: 38.3 % (ref 35–47)
HGB BLD-MCNC: 12.3 G/DL (ref 11.5–16)
HGB UR QL STRIP: NEGATIVE
HYALINE CASTS URNS QL MICRO: NORMAL /LPF (ref 0–5)
IMM GRANULOCYTES # BLD AUTO: 0 K/UL (ref 0–0.04)
IMM GRANULOCYTES NFR BLD AUTO: 0 % (ref 0–0.5)
INR PPP: 1 (ref 0.9–1.1)
KETONES UR QL STRIP.AUTO: NEGATIVE MG/DL
LEUKOCYTE ESTERASE UR QL STRIP.AUTO: NEGATIVE
LYMPHOCYTES # BLD: 1.5 K/UL (ref 0.8–3.5)
LYMPHOCYTES NFR BLD: 28 % (ref 12–49)
MCH RBC QN AUTO: 29.9 PG (ref 26–34)
MCHC RBC AUTO-ENTMCNC: 32.1 G/DL (ref 30–36.5)
MCV RBC AUTO: 93 FL (ref 80–99)
MONOCYTES # BLD: 0.6 K/UL (ref 0–1)
MONOCYTES NFR BLD: 11 % (ref 5–13)
NEUTS SEG # BLD: 3 K/UL (ref 1.8–8)
NEUTS SEG NFR BLD: 56 % (ref 32–75)
NITRITE UR QL STRIP.AUTO: NEGATIVE
NRBC # BLD: 0 K/UL (ref 0–0.01)
NRBC BLD-RTO: 0 PER 100 WBC
PH UR STRIP: 5 (ref 5–8)
PLATELET # BLD AUTO: 284 K/UL (ref 150–400)
PMV BLD AUTO: 10.3 FL (ref 8.9–12.9)
POTASSIUM SERPL-SCNC: 4.2 MMOL/L (ref 3.5–5.1)
PROT UR STRIP-MCNC: NEGATIVE MG/DL
PROTHROMBIN TIME: 10.2 SEC (ref 9–11.1)
RBC # BLD AUTO: 4.12 M/UL (ref 3.8–5.2)
RBC #/AREA URNS HPF: NORMAL /HPF (ref 0–5)
SODIUM SERPL-SCNC: 137 MMOL/L (ref 136–145)
SP GR UR REFRACTOMETRY: 1.02 (ref 1–1.03)
URINE CULTURE IF INDICATED: NORMAL
UROBILINOGEN UR QL STRIP.AUTO: 0.2 EU/DL (ref 0.2–1)
WBC # BLD AUTO: 5.3 K/UL (ref 3.6–11)
WBC URNS QL MICRO: NORMAL /HPF (ref 0–4)

## 2023-09-27 PROCEDURE — 86900 BLOOD TYPING SEROLOGIC ABO: CPT

## 2023-09-27 PROCEDURE — 83036 HEMOGLOBIN GLYCOSYLATED A1C: CPT

## 2023-09-27 PROCEDURE — 85025 COMPLETE CBC W/AUTO DIFF WBC: CPT

## 2023-09-27 PROCEDURE — 86901 BLOOD TYPING SEROLOGIC RH(D): CPT

## 2023-09-27 PROCEDURE — 81001 URINALYSIS AUTO W/SCOPE: CPT

## 2023-09-27 PROCEDURE — 93005 ELECTROCARDIOGRAM TRACING: CPT | Performed by: ORTHOPAEDIC SURGERY

## 2023-09-27 PROCEDURE — 36415 COLL VENOUS BLD VENIPUNCTURE: CPT

## 2023-09-27 PROCEDURE — 86850 RBC ANTIBODY SCREEN: CPT

## 2023-09-27 PROCEDURE — 80048 BASIC METABOLIC PNL TOTAL CA: CPT

## 2023-09-27 PROCEDURE — 85610 PROTHROMBIN TIME: CPT

## 2023-09-27 RX ORDER — IBUPROFEN 200 MG
600 TABLET ORAL 2 TIMES DAILY
COMMUNITY

## 2023-09-27 RX ORDER — DOCUSATE SODIUM 100 MG/1
100 CAPSULE, LIQUID FILLED ORAL DAILY
COMMUNITY

## 2023-09-27 RX ORDER — M-VIT,TX,IRON,MINS/CALC/FOLIC 27MG-0.4MG
1 TABLET ORAL DAILY
COMMUNITY

## 2023-09-27 RX ORDER — MULTIVIT-MIN/IRON/FOLIC ACID/K 18-600-40
CAPSULE ORAL DAILY
COMMUNITY

## 2023-09-28 LAB
ABO + RH BLD: NORMAL
BACTERIA SPEC CULT: NORMAL
BACTERIA SPEC CULT: NORMAL
BLOOD GROUP ANTIBODIES SERPL: NORMAL
SERVICE CMNT-IMP: NORMAL
SPECIMEN EXP DATE BLD: NORMAL

## 2023-09-29 NOTE — PERIOP NOTE
ML ON VM FOR DR. Sarahi Kelly ASSISTANT/NICK/[phone] TO ADVISE THAT MRSA SWAB DID NOT SHOW MRSA, BUT WAS POS FOR STAPH AUREUS.   INDICATED IN MSG THAT Agustin Li NP IS NOT IN TODAY AND THAT THEY WOULD NEED TO TREAT PT.
bandage clean and dry! Do not touch your surgical wound. Use clean, freshly washed towels and washcloths every time you shower; do not share bath linens with others. Until your surgical wound is healed, wear clothing and sleep on bed linens each day that are clean and freshly washed. Do not allow pets to sleep in your bed with you or touch your surgical wound. Do not smoke - smoking delays wound healing. This may be a good time to stop smoking. If you have diabetes, it is important for you to manage your blood sugar levels properly before your surgery as well as after your surgery. Poorly managed blood sugar levels slow down wound healing and prevent you from healing completely. Prevention of Infection  Testing for Staphylococcus aureus on your skin before surgery    Staphylococcus aureus (staph) is a common bacteria that is found on the body. It normally does not cause infection on healthy skin. Before surgery, you will be tested to see if you have staph by swabbing the inside of your nose. When you have an incision with surgery, the goal is to protect that incision from infection. Removal of the staph bacteria before surgery can decrease the risk of a surgical site infection. If your nose swab is positive for staph you will be called. Your treatment will include 2 steps:  Prescription for Mupirocin ointment to be used in each nostril twice a day for 5 days. Showering with Chlorhexidine (CHG) liquid soap for 5 days prior to surgery. How to use Mupirocin ointment in your nose   the prescription from your pharmacy. You will receive a large tube of ointment which will be big enough for all of your treatments. You will apply this ointment to each nostril 2  times a day for 5 days. Wash your hands with  gel or soap and water for 20 seconds before using ointment. Place a pea-sized amount of ointment on a cotton Q-tip. Apply ointment just inside of each nostril with the Q-tip.  Do not

## 2023-10-05 ENCOUNTER — ANESTHESIA (OUTPATIENT)
Facility: HOSPITAL | Age: 73
End: 2023-10-05
Payer: COMMERCIAL

## 2023-10-05 ENCOUNTER — ANESTHESIA EVENT (OUTPATIENT)
Facility: HOSPITAL | Age: 73
End: 2023-10-05
Payer: COMMERCIAL

## 2023-10-05 ENCOUNTER — HOSPITAL ENCOUNTER (INPATIENT)
Facility: HOSPITAL | Age: 73
LOS: 1 days | Discharge: HOME | DRG: 982 | End: 2023-10-07
Attending: ORTHOPAEDIC SURGERY | Admitting: ORTHOPAEDIC SURGERY
Payer: COMMERCIAL

## 2023-10-05 DIAGNOSIS — M17.12 OSTEOARTHRITIS OF LEFT KNEE, UNSPECIFIED OSTEOARTHRITIS TYPE: Primary | ICD-10-CM

## 2023-10-05 DIAGNOSIS — R00.0 HEART RATE FAST: ICD-10-CM

## 2023-10-05 LAB
GLUCOSE BLD STRIP.AUTO-MCNC: 101 MG/DL (ref 65–117)
SERVICE CMNT-IMP: NORMAL

## 2023-10-05 PROCEDURE — 0SRD0J9 REPLACEMENT OF LEFT KNEE JOINT WITH SYNTHETIC SUBSTITUTE, CEMENTED, OPEN APPROACH: ICD-10-PCS | Performed by: ORTHOPAEDIC SURGERY

## 2023-10-05 PROCEDURE — 6360000002 HC RX W HCPCS: Performed by: STUDENT IN AN ORGANIZED HEALTH CARE EDUCATION/TRAINING PROGRAM

## 2023-10-05 PROCEDURE — 94760 N-INVAS EAR/PLS OXIMETRY 1: CPT

## 2023-10-05 PROCEDURE — 2580000003 HC RX 258: Performed by: NURSE ANESTHETIST, CERTIFIED REGISTERED

## 2023-10-05 PROCEDURE — 6370000000 HC RX 637 (ALT 250 FOR IP): Performed by: ORTHOPAEDIC SURGERY

## 2023-10-05 PROCEDURE — 64447 NJX AA&/STRD FEMORAL NRV IMG: CPT | Performed by: ANESTHESIOLOGY

## 2023-10-05 PROCEDURE — 6360000002 HC RX W HCPCS: Performed by: ORTHOPAEDIC SURGERY

## 2023-10-05 PROCEDURE — 2580000003 HC RX 258: Performed by: ORTHOPAEDIC SURGERY

## 2023-10-05 PROCEDURE — G0378 HOSPITAL OBSERVATION PER HR: HCPCS

## 2023-10-05 PROCEDURE — 2500000003 HC RX 250 WO HCPCS: Performed by: NURSE ANESTHETIST, CERTIFIED REGISTERED

## 2023-10-05 PROCEDURE — 2580000003 HC RX 258: Performed by: STUDENT IN AN ORGANIZED HEALTH CARE EDUCATION/TRAINING PROGRAM

## 2023-10-05 PROCEDURE — 27447 TOTAL KNEE ARTHROPLASTY: CPT | Performed by: ORTHOPAEDIC SURGERY

## 2023-10-05 PROCEDURE — 6360000002 HC RX W HCPCS: Performed by: NURSE ANESTHETIST, CERTIFIED REGISTERED

## 2023-10-05 PROCEDURE — 3600000005 HC SURGERY LEVEL 5 BASE: Performed by: ORTHOPAEDIC SURGERY

## 2023-10-05 PROCEDURE — C1713 ANCHOR/SCREW BN/BN,TIS/BN: HCPCS | Performed by: ORTHOPAEDIC SURGERY

## 2023-10-05 PROCEDURE — 7100000000 HC PACU RECOVERY - FIRST 15 MIN: Performed by: ORTHOPAEDIC SURGERY

## 2023-10-05 PROCEDURE — 7100000001 HC PACU RECOVERY - ADDTL 15 MIN: Performed by: ORTHOPAEDIC SURGERY

## 2023-10-05 PROCEDURE — 2709999900 HC NON-CHARGEABLE SUPPLY: Performed by: ORTHOPAEDIC SURGERY

## 2023-10-05 PROCEDURE — C9290 INJ, BUPIVACAINE LIPOSOME: HCPCS | Performed by: ORTHOPAEDIC SURGERY

## 2023-10-05 PROCEDURE — 82962 GLUCOSE BLOOD TEST: CPT

## 2023-10-05 PROCEDURE — 2500000003 HC RX 250 WO HCPCS: Performed by: ORTHOPAEDIC SURGERY

## 2023-10-05 PROCEDURE — C1776 JOINT DEVICE (IMPLANTABLE): HCPCS | Performed by: ORTHOPAEDIC SURGERY

## 2023-10-05 PROCEDURE — 3700000001 HC ADD 15 MINUTES (ANESTHESIA): Performed by: ORTHOPAEDIC SURGERY

## 2023-10-05 PROCEDURE — 3600000015 HC SURGERY LEVEL 5 ADDTL 15MIN: Performed by: ORTHOPAEDIC SURGERY

## 2023-10-05 PROCEDURE — 3700000000 HC ANESTHESIA ATTENDED CARE: Performed by: ORTHOPAEDIC SURGERY

## 2023-10-05 DEVICE — IMPLANTABLE DEVICE
Type: IMPLANTABLE DEVICE | Site: KNEE | Status: FUNCTIONAL
Brand: OPTETRAK

## 2023-10-05 DEVICE — CEMENT BNE MED VISC 80 GM GENTAMICIN PALACOS MV+G PRO: Type: IMPLANTABLE DEVICE | Site: KNEE | Status: FUNCTIONAL

## 2023-10-05 DEVICE — COMPONENT TOT KNEE CAPPED K1 STD HEMI CEM: Type: IMPLANTABLE DEVICE | Status: FUNCTIONAL

## 2023-10-05 DEVICE — IMPLANTABLE DEVICE
Type: IMPLANTABLE DEVICE | Site: KNEE | Status: FUNCTIONAL
Brand: TRULIANT

## 2023-10-05 RX ORDER — SODIUM CHLORIDE 9 MG/ML
INJECTION, SOLUTION INTRAVENOUS PRN
Status: DISCONTINUED | OUTPATIENT
Start: 2023-10-05 | End: 2023-10-07 | Stop reason: HOSPADM

## 2023-10-05 RX ORDER — DEXAMETHASONE SODIUM PHOSPHATE 10 MG/ML
8 INJECTION, SOLUTION INTRAMUSCULAR; INTRAVENOUS ONCE
Status: DISCONTINUED | OUTPATIENT
Start: 2023-10-05 | End: 2023-10-05 | Stop reason: HOSPADM

## 2023-10-05 RX ORDER — OXYCODONE HYDROCHLORIDE 5 MG/1
10 TABLET ORAL EVERY 4 HOURS PRN
Status: DISCONTINUED | OUTPATIENT
Start: 2023-10-05 | End: 2023-10-07 | Stop reason: HOSPADM

## 2023-10-05 RX ORDER — FENTANYL CITRATE 50 UG/ML
INJECTION, SOLUTION INTRAMUSCULAR; INTRAVENOUS PRN
Status: DISCONTINUED | OUTPATIENT
Start: 2023-10-05 | End: 2023-10-05 | Stop reason: SDUPTHER

## 2023-10-05 RX ORDER — SODIUM CHLORIDE, SODIUM LACTATE, POTASSIUM CHLORIDE, CALCIUM CHLORIDE 600; 310; 30; 20 MG/100ML; MG/100ML; MG/100ML; MG/100ML
INJECTION, SOLUTION INTRAVENOUS CONTINUOUS
Status: DISCONTINUED | OUTPATIENT
Start: 2023-10-05 | End: 2023-10-05 | Stop reason: HOSPADM

## 2023-10-05 RX ORDER — PROPOFOL 10 MG/ML
INJECTION, EMULSION INTRAVENOUS PRN
Status: DISCONTINUED | OUTPATIENT
Start: 2023-10-05 | End: 2023-10-05 | Stop reason: SDUPTHER

## 2023-10-05 RX ORDER — KETAMINE HCL IN NACL, ISO-OSM 100MG/10ML
SYRINGE (ML) INJECTION PRN
Status: DISCONTINUED | OUTPATIENT
Start: 2023-10-05 | End: 2023-10-05 | Stop reason: SDUPTHER

## 2023-10-05 RX ORDER — FENTANYL CITRATE 50 UG/ML
100 INJECTION, SOLUTION INTRAMUSCULAR; INTRAVENOUS
Status: COMPLETED | OUTPATIENT
Start: 2023-10-05 | End: 2023-10-05

## 2023-10-05 RX ORDER — PROCHLORPERAZINE EDISYLATE 5 MG/ML
5 INJECTION INTRAMUSCULAR; INTRAVENOUS
Status: DISCONTINUED | OUTPATIENT
Start: 2023-10-05 | End: 2023-10-05 | Stop reason: HOSPADM

## 2023-10-05 RX ORDER — SODIUM CHLORIDE 9 MG/ML
INJECTION, SOLUTION INTRAVENOUS PRN
Status: DISCONTINUED | OUTPATIENT
Start: 2023-10-05 | End: 2023-10-05 | Stop reason: HOSPADM

## 2023-10-05 RX ORDER — GINSENG 100 MG
CAPSULE ORAL 3 TIMES DAILY
Status: DISCONTINUED | OUTPATIENT
Start: 2023-10-05 | End: 2023-10-05

## 2023-10-05 RX ORDER — SODIUM CHLORIDE 0.9 % (FLUSH) 0.9 %
5-40 SYRINGE (ML) INJECTION PRN
Status: DISCONTINUED | OUTPATIENT
Start: 2023-10-05 | End: 2023-10-05 | Stop reason: HOSPADM

## 2023-10-05 RX ORDER — MIDAZOLAM HYDROCHLORIDE 2 MG/2ML
2 INJECTION, SOLUTION INTRAMUSCULAR; INTRAVENOUS
Status: COMPLETED | OUTPATIENT
Start: 2023-10-05 | End: 2023-10-05

## 2023-10-05 RX ORDER — HYDROMORPHONE HYDROCHLORIDE 1 MG/ML
0.5 INJECTION, SOLUTION INTRAMUSCULAR; INTRAVENOUS; SUBCUTANEOUS EVERY 5 MIN PRN
Status: DISCONTINUED | OUTPATIENT
Start: 2023-10-05 | End: 2023-10-05 | Stop reason: HOSPADM

## 2023-10-05 RX ORDER — OXYCODONE HYDROCHLORIDE 5 MG/1
5 TABLET ORAL
Status: DISCONTINUED | OUTPATIENT
Start: 2023-10-05 | End: 2023-10-05 | Stop reason: HOSPADM

## 2023-10-05 RX ORDER — SENNOSIDES A AND B 8.6 MG/1
1 TABLET, FILM COATED ORAL DAILY PRN
Status: DISCONTINUED | OUTPATIENT
Start: 2023-10-05 | End: 2023-10-07 | Stop reason: HOSPADM

## 2023-10-05 RX ORDER — WARFARIN SODIUM 5 MG/1
5 TABLET ORAL
Status: COMPLETED | OUTPATIENT
Start: 2023-10-05 | End: 2023-10-05

## 2023-10-05 RX ORDER — SODIUM CHLORIDE 0.9 % (FLUSH) 0.9 %
5-40 SYRINGE (ML) INJECTION EVERY 12 HOURS SCHEDULED
Status: DISCONTINUED | OUTPATIENT
Start: 2023-10-05 | End: 2023-10-05 | Stop reason: HOSPADM

## 2023-10-05 RX ORDER — FENTANYL CITRATE 50 UG/ML
25 INJECTION, SOLUTION INTRAMUSCULAR; INTRAVENOUS EVERY 5 MIN PRN
Status: DISCONTINUED | OUTPATIENT
Start: 2023-10-05 | End: 2023-10-05 | Stop reason: HOSPADM

## 2023-10-05 RX ORDER — OXYCODONE HYDROCHLORIDE 5 MG/1
5-10 TABLET ORAL EVERY 4 HOURS PRN
Qty: 40 TABLET | Refills: 0 | Status: SHIPPED | OUTPATIENT
Start: 2023-10-05 | End: 2023-10-08

## 2023-10-05 RX ORDER — BISACODYL 5 MG/1
5 TABLET, DELAYED RELEASE ORAL DAILY PRN
Status: DISCONTINUED | OUTPATIENT
Start: 2023-10-05 | End: 2023-10-07 | Stop reason: HOSPADM

## 2023-10-05 RX ORDER — ROCURONIUM BROMIDE 10 MG/ML
INJECTION, SOLUTION INTRAVENOUS PRN
Status: DISCONTINUED | OUTPATIENT
Start: 2023-10-05 | End: 2023-10-05 | Stop reason: SDUPTHER

## 2023-10-05 RX ORDER — ONDANSETRON 2 MG/ML
INJECTION INTRAMUSCULAR; INTRAVENOUS PRN
Status: DISCONTINUED | OUTPATIENT
Start: 2023-10-05 | End: 2023-10-05 | Stop reason: SDUPTHER

## 2023-10-05 RX ORDER — DEXAMETHASONE SODIUM PHOSPHATE 4 MG/ML
INJECTION, SOLUTION INTRA-ARTICULAR; INTRALESIONAL; INTRAMUSCULAR; INTRAVENOUS; SOFT TISSUE PRN
Status: DISCONTINUED | OUTPATIENT
Start: 2023-10-05 | End: 2023-10-05 | Stop reason: SDUPTHER

## 2023-10-05 RX ORDER — SUCCINYLCHOLINE/SOD CL,ISO/PF 200MG/10ML
SYRINGE (ML) INTRAVENOUS PRN
Status: DISCONTINUED | OUTPATIENT
Start: 2023-10-05 | End: 2023-10-05 | Stop reason: SDUPTHER

## 2023-10-05 RX ORDER — FAMOTIDINE 20 MG/1
20 TABLET, FILM COATED ORAL 2 TIMES DAILY
Status: DISCONTINUED | OUTPATIENT
Start: 2023-10-05 | End: 2023-10-07 | Stop reason: HOSPADM

## 2023-10-05 RX ORDER — LIDOCAINE HYDROCHLORIDE 20 MG/ML
INJECTION, SOLUTION EPIDURAL; INFILTRATION; INTRACAUDAL; PERINEURAL PRN
Status: DISCONTINUED | OUTPATIENT
Start: 2023-10-05 | End: 2023-10-05 | Stop reason: SDUPTHER

## 2023-10-05 RX ORDER — WARFARIN SODIUM 2.5 MG/1
2.5 TABLET ORAL DAILY
Qty: 60 TABLET | Refills: 3 | Status: SHIPPED | OUTPATIENT
Start: 2023-10-05

## 2023-10-05 RX ORDER — HYDROMORPHONE HYDROCHLORIDE 1 MG/ML
0.5 INJECTION, SOLUTION INTRAMUSCULAR; INTRAVENOUS; SUBCUTANEOUS
Status: DISCONTINUED | OUTPATIENT
Start: 2023-10-05 | End: 2023-10-07 | Stop reason: HOSPADM

## 2023-10-05 RX ORDER — ROPIVACAINE HYDROCHLORIDE 5 MG/ML
INJECTION, SOLUTION EPIDURAL; INFILTRATION; PERINEURAL
Status: COMPLETED | OUTPATIENT
Start: 2023-10-05 | End: 2023-10-05

## 2023-10-05 RX ORDER — ONDANSETRON 4 MG/1
4 TABLET, ORALLY DISINTEGRATING ORAL EVERY 8 HOURS PRN
Status: DISCONTINUED | OUTPATIENT
Start: 2023-10-05 | End: 2023-10-07 | Stop reason: HOSPADM

## 2023-10-05 RX ORDER — ACETAMINOPHEN 500 MG
1000 TABLET ORAL ONCE
Status: DISCONTINUED | OUTPATIENT
Start: 2023-10-05 | End: 2023-10-05 | Stop reason: HOSPADM

## 2023-10-05 RX ORDER — ONDANSETRON 2 MG/ML
4 INJECTION INTRAMUSCULAR; INTRAVENOUS
Status: DISCONTINUED | OUTPATIENT
Start: 2023-10-05 | End: 2023-10-05 | Stop reason: HOSPADM

## 2023-10-05 RX ORDER — SODIUM CHLORIDE 9 MG/ML
INJECTION, SOLUTION INTRAVENOUS CONTINUOUS
Status: DISCONTINUED | OUTPATIENT
Start: 2023-10-05 | End: 2023-10-07

## 2023-10-05 RX ORDER — GINSENG 100 MG
CAPSULE ORAL PRN
Status: DISCONTINUED | OUTPATIENT
Start: 2023-10-05 | End: 2023-10-05 | Stop reason: HOSPADM

## 2023-10-05 RX ORDER — HYDROMORPHONE HYDROCHLORIDE 2 MG/ML
INJECTION, SOLUTION INTRAMUSCULAR; INTRAVENOUS; SUBCUTANEOUS PRN
Status: DISCONTINUED | OUTPATIENT
Start: 2023-10-05 | End: 2023-10-05 | Stop reason: SDUPTHER

## 2023-10-05 RX ORDER — HYDRALAZINE HYDROCHLORIDE 20 MG/ML
10 INJECTION INTRAMUSCULAR; INTRAVENOUS
Status: DISCONTINUED | OUTPATIENT
Start: 2023-10-05 | End: 2023-10-05 | Stop reason: HOSPADM

## 2023-10-05 RX ORDER — ONDANSETRON 2 MG/ML
4 INJECTION INTRAMUSCULAR; INTRAVENOUS EVERY 6 HOURS PRN
Status: DISCONTINUED | OUTPATIENT
Start: 2023-10-05 | End: 2023-10-07 | Stop reason: HOSPADM

## 2023-10-05 RX ORDER — TRANEXAMIC ACID 100 MG/ML
INJECTION, SOLUTION INTRAVENOUS PRN
Status: DISCONTINUED | OUTPATIENT
Start: 2023-10-05 | End: 2023-10-05 | Stop reason: HOSPADM

## 2023-10-05 RX ORDER — SODIUM CHLORIDE 0.9 % (FLUSH) 0.9 %
5-40 SYRINGE (ML) INJECTION PRN
Status: DISCONTINUED | OUTPATIENT
Start: 2023-10-05 | End: 2023-10-07 | Stop reason: HOSPADM

## 2023-10-05 RX ORDER — HYDROXYZINE HYDROCHLORIDE 10 MG/1
10 TABLET, FILM COATED ORAL EVERY 8 HOURS PRN
Status: DISCONTINUED | OUTPATIENT
Start: 2023-10-05 | End: 2023-10-07 | Stop reason: HOSPADM

## 2023-10-05 RX ORDER — ACETAMINOPHEN 325 MG/1
650 TABLET ORAL EVERY 6 HOURS
Status: DISCONTINUED | OUTPATIENT
Start: 2023-10-05 | End: 2023-10-07 | Stop reason: HOSPADM

## 2023-10-05 RX ORDER — VITAMIN B COMPLEX
2000 TABLET ORAL DAILY
Status: DISCONTINUED | OUTPATIENT
Start: 2023-10-05 | End: 2023-10-07 | Stop reason: HOSPADM

## 2023-10-05 RX ORDER — OXYCODONE HYDROCHLORIDE 5 MG/1
5 TABLET ORAL EVERY 4 HOURS PRN
Status: DISCONTINUED | OUTPATIENT
Start: 2023-10-05 | End: 2023-10-07 | Stop reason: HOSPADM

## 2023-10-05 RX ORDER — LIDOCAINE HYDROCHLORIDE 10 MG/ML
1 INJECTION, SOLUTION EPIDURAL; INFILTRATION; INTRACAUDAL; PERINEURAL
Status: DISCONTINUED | OUTPATIENT
Start: 2023-10-05 | End: 2023-10-05 | Stop reason: HOSPADM

## 2023-10-05 RX ORDER — M-VIT,TX,IRON,MINS/CALC/FOLIC 27MG-0.4MG
1 TABLET ORAL DAILY
Status: DISCONTINUED | OUTPATIENT
Start: 2023-10-05 | End: 2023-10-07 | Stop reason: HOSPADM

## 2023-10-05 RX ORDER — SODIUM CHLORIDE 0.9 % (FLUSH) 0.9 %
5-40 SYRINGE (ML) INJECTION EVERY 12 HOURS SCHEDULED
Status: DISCONTINUED | OUTPATIENT
Start: 2023-10-05 | End: 2023-10-07 | Stop reason: HOSPADM

## 2023-10-05 RX ADMIN — Medication 140 MG: at 11:24

## 2023-10-05 RX ADMIN — Medication 1 TABLET: at 16:52

## 2023-10-05 RX ADMIN — SUGAMMADEX 200 MG: 100 INJECTION, SOLUTION INTRAVENOUS at 14:00

## 2023-10-05 RX ADMIN — BISACODYL 5 MG: 5 TABLET, COATED ORAL at 18:47

## 2023-10-05 RX ADMIN — ACETAMINOPHEN 650 MG: 325 TABLET ORAL at 22:35

## 2023-10-05 RX ADMIN — WARFARIN SODIUM 5 MG: 5 TABLET ORAL at 18:41

## 2023-10-05 RX ADMIN — SODIUM CHLORIDE: 9 INJECTION, SOLUTION INTRAVENOUS at 13:56

## 2023-10-05 RX ADMIN — OXYCODONE HYDROCHLORIDE 5 MG: 5 TABLET ORAL at 18:41

## 2023-10-05 RX ADMIN — WATER 2000 MG: 1 INJECTION INTRAMUSCULAR; INTRAVENOUS; SUBCUTANEOUS at 11:35

## 2023-10-05 RX ADMIN — ACETAMINOPHEN 650 MG: 325 TABLET ORAL at 16:53

## 2023-10-05 RX ADMIN — ROCURONIUM BROMIDE 35 MG: 10 INJECTION, SOLUTION INTRAVENOUS at 11:35

## 2023-10-05 RX ADMIN — ONDANSETRON 4 MG: 2 INJECTION INTRAMUSCULAR; INTRAVENOUS at 13:55

## 2023-10-05 RX ADMIN — HYDROMORPHONE HYDROCHLORIDE 1 MG: 2 INJECTION, SOLUTION INTRAMUSCULAR; INTRAVENOUS; SUBCUTANEOUS at 12:03

## 2023-10-05 RX ADMIN — DEXAMETHASONE SODIUM PHOSPHATE 4 MG: 4 INJECTION, SOLUTION INTRAMUSCULAR; INTRAVENOUS at 11:57

## 2023-10-05 RX ADMIN — HYDROMORPHONE HYDROCHLORIDE 0.5 MG: 1 INJECTION, SOLUTION INTRAMUSCULAR; INTRAVENOUS; SUBCUTANEOUS at 22:36

## 2023-10-05 RX ADMIN — MIDAZOLAM 2 MG: 1 INJECTION INTRAMUSCULAR; INTRAVENOUS at 11:04

## 2023-10-05 RX ADMIN — FENTANYL CITRATE 50 MCG: 50 INJECTION, SOLUTION INTRAMUSCULAR; INTRAVENOUS at 11:24

## 2023-10-05 RX ADMIN — LIDOCAINE HYDROCHLORIDE 80 MG: 20 INJECTION, SOLUTION EPIDURAL; INFILTRATION; INTRACAUDAL; PERINEURAL at 11:24

## 2023-10-05 RX ADMIN — ROCURONIUM BROMIDE 5 MG: 10 INJECTION, SOLUTION INTRAVENOUS at 11:24

## 2023-10-05 RX ADMIN — SODIUM CHLORIDE, PRESERVATIVE FREE 10 ML: 5 INJECTION INTRAVENOUS at 22:40

## 2023-10-05 RX ADMIN — SODIUM CHLORIDE: 9 INJECTION, SOLUTION INTRAVENOUS at 14:48

## 2023-10-05 RX ADMIN — PROPOFOL 50 MG: 10 INJECTION, EMULSION INTRAVENOUS at 12:03

## 2023-10-05 RX ADMIN — PROPOFOL 150 MG: 10 INJECTION, EMULSION INTRAVENOUS at 11:24

## 2023-10-05 RX ADMIN — Medication 25 MG: at 11:35

## 2023-10-05 RX ADMIN — SODIUM CHLORIDE, POTASSIUM CHLORIDE, SODIUM LACTATE AND CALCIUM CHLORIDE: 600; 310; 30; 20 INJECTION, SOLUTION INTRAVENOUS at 10:41

## 2023-10-05 RX ADMIN — WATER 2000 MG: 1 INJECTION INTRAMUSCULAR; INTRAVENOUS; SUBCUTANEOUS at 18:41

## 2023-10-05 RX ADMIN — ROPIVACAINE HYDROCHLORIDE 30 ML: 5 INJECTION, SOLUTION EPIDURAL; INFILTRATION; PERINEURAL at 11:00

## 2023-10-05 RX ADMIN — PHENYLEPHRINE HYDROCHLORIDE 80 MCG: 10 INJECTION INTRAVENOUS at 11:30

## 2023-10-05 RX ADMIN — FENTANYL CITRATE 25 MCG: 50 INJECTION, SOLUTION INTRAMUSCULAR; INTRAVENOUS at 11:02

## 2023-10-05 RX ADMIN — FAMOTIDINE 20 MG: 20 TABLET ORAL at 22:35

## 2023-10-05 RX ADMIN — PHENYLEPHRINE HYDROCHLORIDE 40 MCG/MIN: 10 INJECTION INTRAVENOUS at 11:29

## 2023-10-05 ASSESSMENT — PAIN DESCRIPTION - LOCATION: LOCATION: KNEE

## 2023-10-05 ASSESSMENT — PAIN DESCRIPTION - ORIENTATION: ORIENTATION: LEFT

## 2023-10-05 ASSESSMENT — PAIN SCALES - GENERAL
PAINLEVEL_OUTOF10: 5
PAINLEVEL_OUTOF10: 7

## 2023-10-05 ASSESSMENT — PAIN - FUNCTIONAL ASSESSMENT: PAIN_FUNCTIONAL_ASSESSMENT: 0-10

## 2023-10-05 ASSESSMENT — PAIN DESCRIPTION - DESCRIPTORS: DESCRIPTORS: TIGHTNESS;THROBBING;ACHING

## 2023-10-05 NOTE — PERIOP NOTE
Irrisept irrigation used during the procedure  Ref # [id number]  Lot # [id number]  Exp.  Date 5/31/2026

## 2023-10-05 NOTE — ANESTHESIA PROCEDURE NOTES
Peripheral Block    Patient location during procedure: pre-op  Reason for block: post-op pain management and at surgeon's request  Start time: 10/5/2023 11:00 AM  End time: 10/5/2023 11:04 AM  Staffing  Performed: anesthesiologist   Anesthesiologist: Domo Tyson MD  Performed by: Domo Tyson MD  Authorized by: Domo Tyson MD    Preanesthetic Checklist  Completed: patient identified, IV checked, site marked, risks and benefits discussed, surgical/procedural consents, equipment checked, pre-op evaluation, timeout performed, anesthesia consent given, oxygen available, monitors applied/VS acknowledged and fire risk safety assessment completed and verbalized  Peripheral Block   Patient position: supine  Prep: ChloraPrep  Provider prep: mask and sterile gloves  Patient monitoring: cardiac monitor, continuous pulse ox, frequent blood pressure checks, IV access and oxygen  Block type: Saphenous  Laterality: left  Injection technique: single-shot  Guidance: ultrasound guided  Local infiltration: ropivacaine  Infiltration strength: 0.5 %  Local infiltration: ropivacaineDose: 30 mL    Needle   Needle type: insulated echogenic nerve stimulator needle   Needle gauge: 21 G  Needle localization: anatomical landmarks and ultrasound guidance  Needle length: 10 cm  Assessment   Injection assessment: negative aspiration for heme, no paresthesia on injection, local visualized surrounding nerve on ultrasound and no intravascular symptoms  Paresthesia pain: none  Slow fractionated injection: yes  Hemodynamics: stable  Real-time US image taken/store: yes  Outcomes: uncomplicated and patient tolerated procedure well    Medications Administered  ropivacaine (NAROPIN) injection 0.5% - Perineural   30 mL - 10/5/2023 11:00:00 AM

## 2023-10-05 NOTE — BRIEF OP NOTE
Brief Postoperative Note      Patient: Nida Hills  YOB: 1950  MRN: [id number]    Date of Procedure: 10/5/2023    Pre-Op Diagnosis Codes:     * Primary osteoarthritis of left knee [M17.12]    Post-Op Diagnosis: Post-Op Diagnosis Codes:     * Primary osteoarthritis of left knee [M17.12]       Procedure(s):  LEFT TOTAL KNEE ARTHROPLASTY (GEN W/BLOCK)    Surgeon(s):  DO Wil Guillen MD    Assistant:  Lima De La Cruz    Anesthesia: General with local    Estimated Blood Loss (mL): less than 289     Complications: None    Specimens:   Bone discarded    Implants:  Implant Name Type Inv.  Item Serial No.  Lot No. LRB No. Used Action   CEMENT BNE MED VISC 80 GM GENTAMICIN PALACOS MV+G PRO - SNA  CEMENT BNE MED VISC 80 GM GENTAMICIN PALACOS MV+G PRO NA [employer]- [id number] Left 1 Implanted   COMPONENT PAT 32MM NGA 7MM THCK 3 PEG 3 RND GELA STD MARCIA NP - [id number]  COMPONENT PAT 32MM NGA 7MM THCK 3 PEG 3 RND GELA STD MARCIA NP [id number] [employer]WD NA Left 1 Implanted   COMPONENT FEM SZ 4 LT CRUC RET MARCIA TRULIANT - [id number]  COMPONENT FEM SZ 4 LT CRUC RET MARCIA TRULIANT [id number] [employer]WD NA Left 1 Implanted   COMPONENT TIB TY MARCIA 4F/3.5T KNEE TRULIANT - [id number]  COMPONENT TIB TY MARCIA 4F/3.5T KNEE TRULIANT [id number] [employer]WD NA Left 1 Implanted   INSERT TIB CR 4 9 MM TRULIANT - [id number]  INSERT TIB CR 4 9 MM TRULIANT [id number] [employer]WD NA Left 1 Implanted             Findings: oa      Electronically signed by Wil Gonzalez MD on 10/5/2023 at 1:51 PM

## 2023-10-05 NOTE — OP NOTE
[facility]  OPERATIVE REPORT    Name:  Orion Dias  MR#:  [id number]  :  1950  ACCOUNT #:  [de-identified]  DATE OF SERVICE:  10/05/2023    PREOPERATIVE DIAGNOSIS:  End-stage osteoarthritis with valgus deformity of the left knee. POSTOPERATIVE DIAGNOSIS:  End-stage osteoarthritis with valgus deformity of the left knee. PROCEDURE PERFORMED:  Left total knee arthroplasty. SURGEON:  Binh Garcia MD    ASSISTANT:  Osiel Shin DO    ANESTHESIA:  General with 266 mg of Exparel. COMPLICATIONS:  Negative. SPECIMENS REMOVED:  Bone, discarded. IMPLANTS:  Exactech Truliant knee, size 4 femur, size 3.5 tibia, and a size 9 CRC poly. Patella was a 32 patella. ESTIMATED BLOOD LOSS:  Less than 150 mL. IV FLUIDS:  Crystalloids given. PERIOPERATIVE MEDICINE:  2 g of Ancef. INDICATIONS:  The patient is a 78-year-old who presented to my office with increasing discomfort in the left knee. She had recurrent effusion and discomfort of the left knee. She had recurrent pain that was mostly located along the lateral aspect of the knee. In consultation with the patient, we discussed the findings. She and I have had a history. I replaced her right knee and she has done quite well. She presented to this office because she wanted to proceed with a total knee replacement on the left. She had bone-on-bone arthritis with valgus deformity and it was completely reasonable to consider total knee replacement. I refreshed her memory in regards to the surgery. We talked about the technique. We also talked about the risks such as but not limited to postoperative pain, thigh pain, bruising, numbness and tingling, loss of range of motion, continued pain, infection, DVT, PE, MI, revision surgery, and other unforeseen events that could occur in a perioperative fashion. The patient obviously understood metal and plastic will be implanted to body.   Lastly, the patient understood a

## 2023-10-05 NOTE — PERIOP NOTE
1420: informed Dr. Kristi Christianson of pt HR jumping between 80s to 130s. Monitor reads sinus rhythm. MD to come to bedside to assess pt.     1434: Dr. Kristi Christianson at bedside to assess patients HR and rhythm. MD okay with HR and rhythm. No new orders at this time.  MD okay with pt to transfer to floor

## 2023-10-05 NOTE — H&P
[facility] PRE-OP HISTORY AND PHYSICAL    Subjective:     Patient is a 68 y.o. female presented with a history of left knee pain. Onset of symptoms was gradual with unchanged course since that time. She is being admitted for surgical management of this condition. Patient Active Problem List    Diagnosis Date Noted    Osteoarthrosis of knee 01/30/2020    OA (osteoarthritis) of knee 01/30/2020    S/P lumbar spinal fusion 10/29/2018    Spinal stenosis, lumbar 10/25/2018    Lumbar stenosis 10/25/2018    Achilles tendon tear 05/01/2015     Past Medical History:   Diagnosis Date    Achilles tendon tear     GERD (gastroesophageal reflux disease)     Osteoarthritis     Varicose vein of leg       Past Surgical History:   Procedure Laterality Date    APPENDECTOMY  1984    BACK SURGERY  2019    LUMBAR FUSION L3-5    BREAST BIOPSY Right     FATTY LUMP - BENIGN    CYSTOCELE REPAIR      HYSTERECTOMY (CERVIX STATUS UNKNOWN)  1997    JOINT REPLACEMENT Right 2020    KNEE ARTHROSCOPY Left     MENISCUS REPAIR    ORTHOPEDIC SURGERY      STEM CELL INJECTIONS (21) FOR LT. ANKLE WOUND    ORTHOPEDIC SURGERY Left     ACHILLES TENDON REPAIR, DIDN'T WORK    RECTOCELE REPAIR      UROLOGICAL SURGERY      CYSTOCELE, RECTOCELE REPAIR    WRIST FRACTURE SURGERY Right       Prior to Admission medications    Medication Sig Start Date End Date Taking?  Authorizing Provider   mupirocin (BACTROBAN) 2 % ointment Apply to each nostril twice a day for 7days 9/29/23 10/6/23  Marvin Jackson MD   ibuprofen (ADVIL;MOTRIN) 200 MG tablet Take 3 tablets by mouth 2 times daily    Callum Meyer MD   Ibuprofen-diphenhydrAMINE Cit (ADVIL PM PO) Take by mouth nightly    Callum Meyer MD   docusate sodium (COLACE) 100 MG capsule Take 1 capsule by mouth daily    Callum Meyer MD   Multiple Vitamins-Minerals (THERAPEUTIC MULTIVITAMIN-MINERALS) tablet Take 1 tablet by mouth daily    Callum Meyer MD   Cholecalciferol (VITAMIN D) 50 MCG no masses, no organomegaly   Extremities:   Left Knee: pain with limited ROM. NVI   Pulses:   2+ and symmetric all extremities   Skin:   Skin color, texture, turgor normal, no rashes or lesions   Lymph nodes:   Cervical, supraclavicular, and axillary nodes normal   Neurologic:   CNII-XII intact. Normal strength, sensation and reflexes       throughout           Assessment:     Active Problems:    * No active hospital problems. *  Resolved Problems:    * No resolved hospital problems. *      Plan:     The various methods of treatment have been discussed with the patient and family. After consideration of risks, benefits and other options for treatment, the patient has consented to surgical intervention. Risks of infection, DVT, PE, MI, CVA and unforeseen events described to the patient. Additionally discussed the possibility of not being able to resolve all preop pain. Patient understands metal and plastic will be implanted in the body and understands the potential for revision surgery. Patient wishes to proceed with elective surgery.

## 2023-10-05 NOTE — ANESTHESIA POSTPROCEDURE EVALUATION
Department of Anesthesiology  Postprocedure Note    Patient: Abdiel Bourgeois  MRN: [id number]  YOB: 1950  Date of evaluation: 10/5/2023      Procedure Summary     Date: 10/05/23 Room / Location: [facility] MAIN OR 18 / [facility] MAIN OR    Anesthesia Start: 1115 Anesthesia Stop: 9367    Procedure: LEFT TOTAL KNEE ARTHROPLASTY (GEN W/BLOCK) (Left: Knee) Diagnosis:       Primary osteoarthritis of left knee      (Primary osteoarthritis of left knee [M17.12])    Providers: Matt Morrissey MD Responsible Provider: Frank Mitchell MD    Anesthesia Type: General, Regional ASA Status: 2          Anesthesia Type: General, Regional    Chirag Phase I: Chirag Score: 9    Chirag Phase II:        Anesthesia Post Evaluation    Patient location during evaluation: PACU  Patient participation: complete - patient participated  Level of consciousness: awake  Airway patency: patent  Nausea & Vomiting: no nausea  Complications: no  Cardiovascular status: blood pressure returned to baseline and hemodynamically stable  Respiratory status: acceptable  Hydration status: stable  Multimodal analgesia pain management approach

## 2023-10-06 ENCOUNTER — APPOINTMENT (OUTPATIENT)
Facility: HOSPITAL | Age: 73
DRG: 982 | End: 2023-10-06
Attending: ORTHOPAEDIC SURGERY
Payer: COMMERCIAL

## 2023-10-06 PROBLEM — R00.0 HEART RATE FAST: Status: ACTIVE | Noted: 2023-10-06

## 2023-10-06 LAB
ERYTHROCYTE [DISTWIDTH] IN BLOOD BY AUTOMATED COUNT: 13.1 % (ref 11.5–14.5)
HCT VFR BLD AUTO: 31.8 % (ref 35–47)
HGB BLD-MCNC: 10.2 G/DL (ref 11.5–16)
INR PPP: 1 (ref 0.9–1.1)
MCH RBC QN AUTO: 30.1 PG (ref 26–34)
MCHC RBC AUTO-ENTMCNC: 32.1 G/DL (ref 30–36.5)
MCV RBC AUTO: 93.8 FL (ref 80–99)
NRBC # BLD: 0 K/UL (ref 0–0.01)
NRBC BLD-RTO: 0 PER 100 WBC
PLATELET # BLD AUTO: 263 K/UL (ref 150–400)
PMV BLD AUTO: 10 FL (ref 8.9–12.9)
PROTHROMBIN TIME: 10.6 SEC (ref 9–11.1)
RBC # BLD AUTO: 3.39 M/UL (ref 3.8–5.2)
WBC # BLD AUTO: 10 K/UL (ref 3.6–11)

## 2023-10-06 PROCEDURE — 97116 GAIT TRAINING THERAPY: CPT

## 2023-10-06 PROCEDURE — 2580000003 HC RX 258: Performed by: ORTHOPAEDIC SURGERY

## 2023-10-06 PROCEDURE — 97530 THERAPEUTIC ACTIVITIES: CPT

## 2023-10-06 PROCEDURE — 36415 COLL VENOUS BLD VENIPUNCTURE: CPT

## 2023-10-06 PROCEDURE — 93005 ELECTROCARDIOGRAM TRACING: CPT | Performed by: PHYSICIAN ASSISTANT

## 2023-10-06 PROCEDURE — 6360000002 HC RX W HCPCS: Performed by: ORTHOPAEDIC SURGERY

## 2023-10-06 PROCEDURE — 85027 COMPLETE CBC AUTOMATED: CPT

## 2023-10-06 PROCEDURE — 6370000000 HC RX 637 (ALT 250 FOR IP): Performed by: ORTHOPAEDIC SURGERY

## 2023-10-06 PROCEDURE — G0378 HOSPITAL OBSERVATION PER HR: HCPCS

## 2023-10-06 PROCEDURE — 85610 PROTHROMBIN TIME: CPT

## 2023-10-06 PROCEDURE — 97161 PT EVAL LOW COMPLEX 20 MIN: CPT

## 2023-10-06 RX ORDER — WARFARIN SODIUM 2.5 MG/1
2.5 TABLET ORAL
Status: COMPLETED | OUTPATIENT
Start: 2023-10-06 | End: 2023-10-06

## 2023-10-06 RX ADMIN — Medication 2000 UNITS: at 09:19

## 2023-10-06 RX ADMIN — ACETAMINOPHEN 650 MG: 325 TABLET ORAL at 03:30

## 2023-10-06 RX ADMIN — WATER 2000 MG: 1 INJECTION INTRAMUSCULAR; INTRAVENOUS; SUBCUTANEOUS at 03:29

## 2023-10-06 RX ADMIN — OXYCODONE HYDROCHLORIDE 5 MG: 5 TABLET ORAL at 12:18

## 2023-10-06 RX ADMIN — OXYCODONE HYDROCHLORIDE 5 MG: 5 TABLET ORAL at 18:29

## 2023-10-06 RX ADMIN — ACETAMINOPHEN 650 MG: 325 TABLET ORAL at 20:34

## 2023-10-06 RX ADMIN — FAMOTIDINE 20 MG: 20 TABLET ORAL at 09:19

## 2023-10-06 RX ADMIN — ACETAMINOPHEN 650 MG: 325 TABLET ORAL at 09:17

## 2023-10-06 RX ADMIN — FAMOTIDINE 20 MG: 20 TABLET ORAL at 20:34

## 2023-10-06 RX ADMIN — SODIUM CHLORIDE: 9 INJECTION, SOLUTION INTRAVENOUS at 20:34

## 2023-10-06 RX ADMIN — WARFARIN SODIUM 2.5 MG: 2.5 TABLET ORAL at 12:18

## 2023-10-06 RX ADMIN — Medication 1 TABLET: at 09:17

## 2023-10-06 ASSESSMENT — PAIN DESCRIPTION - LOCATION
LOCATION: KNEE

## 2023-10-06 ASSESSMENT — PAIN DESCRIPTION - ORIENTATION: ORIENTATION: LEFT

## 2023-10-06 ASSESSMENT — PAIN SCALES - GENERAL
PAINLEVEL_OUTOF10: 4

## 2023-10-06 ASSESSMENT — PAIN DESCRIPTION - DESCRIPTORS: DESCRIPTORS: ACHING

## 2023-10-06 ASSESSMENT — PAIN - FUNCTIONAL ASSESSMENT: PAIN_FUNCTIONAL_ASSESSMENT: ACTIVITIES ARE NOT PREVENTED

## 2023-10-06 NOTE — PLAN OF CARE
Problem: Physical Therapy - Adult  Goal: By Discharge: Performs mobility at highest level of function for planned discharge setting. See evaluation for individualized goals. Description: FUNCTIONAL STATUS PRIOR TO ADMISSION: Patient was independent and active without use of DME.    HOME SUPPORT PRIOR TO ADMISSION: The patient lived alone but did not require assistance. Physical Therapy Goals  Initiated 10/6/2023  1. Patient will move from supine to sit and sit to supine, scoot up and down, and roll side to side in bed with modified independence within 4 day(s). 2.  Patient will perform sit to stand with modified independence within 4 day(s). 3.  Patient will transfer from bed to chair and chair to bed with modified independence using the least restrictive device within 4 day(s). 4.  Patient will ambulate with modified independence for 150 feet with the least restrictive device within 4 day(s). 5.  Patient will ascend/descend 4 stairs with both handrail(s) with modified independence within 4 day(s). 6. Patient will perform post-YESSENIA home exercise program per protocol with independence within 4 days. 7. Patient will demonstrate AROM 0-90 degrees in operative joint within 4 days. 10/6/2023 1507 by Giovana Garcia PT  Outcome: Completed   PHYSICAL THERAPY TREATMENT/DISCHARGE    Patient: Macie Bee (68 y.o. female)  Date: 10/6/2023  Diagnosis: Primary osteoarthritis of left knee [M17.12]  Osteoarthritis of left knee, unspecified osteoarthritis type [M17.12] Osteoarthritis of left knee, unspecified osteoarthritis type  Procedure(s) (LRB):  LEFT TOTAL KNEE ARTHROPLASTY (GEN W/BLOCK) (Left) 1 Day Post-Op  Precautions:                FALL/WBAT RIGHT      ASSESSMENT:  Patient has been followed by skilled PT services and has met acute care PT goals. Patient is cleared for discharge from PT standpoint. Patient is independent with post TKA exercise protocol and has same in written, illustrated form.  PT Strategies  Education Method: Demonstration;Verbal;Teach Back  Barriers to Learning: None  Education Outcome: Verbalized understanding;Demonstrated understanding      Lindsey Moore, PT  Minutes: 95

## 2023-10-06 NOTE — CARE COORDINATION
Care Management Initial Assessment       RUR: N/A  Readmission? No  1st IM letter given? No  1st  letter given: No    CM met with patient at bedside to introduce self and explain role. Patient is independent prior to admission. Patient owns a walking stick, RW, and shower transfer seat. Patient lives in a 1 level house with 4 steps to enter. Patient receives help from friends. Patient uses [email or web address] on [address] and [employer]	[place]. [address] referrals pending. Patient requested CM to add friend Katelynn Silverio ([phone]) to her emergency contact list. Patient confirms friend will transport her home at time of discharge. 11:56AM: [facility] accepted patient. 10/06/23 1054   Service Assessment   Patient Orientation Alert and Oriented;Person;Place;Situation;Self   Cognition Alert   History Provided By Patient   Primary Zaheer Gentry is: Named in [address]   PCP Verified by CM Yes  (Dr. Etienne Loja)   Last Visit to PCP Within last 3 months   Prior Functional Level Independent in ADLs/IADLs   Can patient return to prior living arrangement Yes   Ability to make needs known: Good   Family able to assist with home care needs: Yes   Financial Resources Medicare   Social/Functional History   Type of [facility]  One level   Home Access Stairs to enter with rails   Entrance Stairs - Number of Steps 4   Home Equipment Walker, rolling  (shower tranfer seat, walking stick)   Receives Help From Friend(s)   Discharge Planning   Type of Residence House   DME Ordered? No   Services At/After Discharge   Services At/After Discharge Home Health   Condition of Participation: Discharge Planning   The Plan for Transition of Care is related to the following treatment goals: Home health   The Patient and/or Patient Representative was provided with a Choice of Provider?  Patient   The Patient and/Or Patient Representative

## 2023-10-07 ENCOUNTER — APPOINTMENT (OUTPATIENT)
Facility: HOSPITAL | Age: 73
DRG: 982 | End: 2023-10-07
Attending: ORTHOPAEDIC SURGERY
Payer: COMMERCIAL

## 2023-10-07 VITALS
TEMPERATURE: 98.4 F | SYSTOLIC BLOOD PRESSURE: 124 MMHG | BODY MASS INDEX: 36.86 KG/M2 | OXYGEN SATURATION: 94 % | DIASTOLIC BLOOD PRESSURE: 60 MMHG | HEART RATE: 88 BPM | RESPIRATION RATE: 17 BRPM | HEIGHT: 62 IN

## 2023-10-07 LAB
ALBUMIN SERPL-MCNC: 2.5 G/DL (ref 3.5–5)
ALBUMIN/GLOB SERPL: 1 (ref 1.1–2.2)
ALP SERPL-CCNC: 100 U/L (ref 45–117)
ALT SERPL-CCNC: 17 U/L (ref 12–78)
ANION GAP SERPL CALC-SCNC: 6 MMOL/L (ref 5–15)
AST SERPL-CCNC: 18 U/L (ref 15–37)
BASOPHILS # BLD: 0 K/UL (ref 0–0.1)
BASOPHILS NFR BLD: 0 % (ref 0–1)
BILIRUB SERPL-MCNC: 0.4 MG/DL (ref 0.2–1)
BUN SERPL-MCNC: 14 MG/DL (ref 6–20)
BUN/CREAT SERPL: 23 (ref 12–20)
CALCIUM SERPL-MCNC: 8.1 MG/DL (ref 8.5–10.1)
CHLORIDE SERPL-SCNC: 112 MMOL/L (ref 97–108)
CO2 SERPL-SCNC: 23 MMOL/L (ref 21–32)
CREAT SERPL-MCNC: 0.61 MG/DL (ref 0.55–1.02)
D DIMER PPP FEU-MCNC: 3.21 MG/L FEU (ref 0–0.65)
DIFFERENTIAL METHOD BLD: ABNORMAL
EKG ATRIAL RATE: 97 BPM
EKG DIAGNOSIS: NORMAL
EKG P AXIS: 30 DEGREES
EKG P-R INTERVAL: 164 MS
EKG Q-T INTERVAL: 324 MS
EKG QRS DURATION: 86 MS
EKG QTC CALCULATION (BAZETT): 411 MS
EKG R AXIS: 28 DEGREES
EKG T AXIS: 28 DEGREES
EKG VENTRICULAR RATE: 97 BPM
EOSINOPHIL # BLD: 0.1 K/UL (ref 0–0.4)
EOSINOPHIL NFR BLD: 1 % (ref 0–7)
ERYTHROCYTE [DISTWIDTH] IN BLOOD BY AUTOMATED COUNT: 12.9 % (ref 11.5–14.5)
FERRITIN SERPL-MCNC: 64 NG/ML (ref 26–388)
FOLATE SERPL-MCNC: 19.4 NG/ML (ref 5–21)
GLOBULIN SER CALC-MCNC: 2.5 G/DL (ref 2–4)
GLUCOSE SERPL-MCNC: 143 MG/DL (ref 65–100)
HCT VFR BLD AUTO: 29.9 % (ref 35–47)
HGB BLD-MCNC: 9.7 G/DL (ref 11.5–16)
IMM GRANULOCYTES # BLD AUTO: 0 K/UL (ref 0–0.04)
IMM GRANULOCYTES NFR BLD AUTO: 0 % (ref 0–0.5)
INR PPP: 1.1 (ref 0.9–1.1)
IRON SATN MFR SERPL: 6 % (ref 20–50)
IRON SERPL-MCNC: 12 UG/DL (ref 35–150)
LYMPHOCYTES # BLD: 1.8 K/UL (ref 0.8–3.5)
LYMPHOCYTES NFR BLD: 20 % (ref 12–49)
MAGNESIUM SERPL-MCNC: 1.9 MG/DL (ref 1.6–2.4)
MCH RBC QN AUTO: 30.6 PG (ref 26–34)
MCHC RBC AUTO-ENTMCNC: 32.4 G/DL (ref 30–36.5)
MCV RBC AUTO: 94.3 FL (ref 80–99)
MONOCYTES # BLD: 1.1 K/UL (ref 0–1)
MONOCYTES NFR BLD: 12 % (ref 5–13)
NEUTS SEG # BLD: 6 K/UL (ref 1.8–8)
NEUTS SEG NFR BLD: 67 % (ref 32–75)
NRBC # BLD: 0 K/UL (ref 0–0.01)
NRBC BLD-RTO: 0 PER 100 WBC
NT PRO BNP: 706 PG/ML
PHOSPHATE SERPL-MCNC: 2.9 MG/DL (ref 2.6–4.7)
PLATELET # BLD AUTO: 243 K/UL (ref 150–400)
PMV BLD AUTO: 9.9 FL (ref 8.9–12.9)
POTASSIUM SERPL-SCNC: 3.7 MMOL/L (ref 3.5–5.1)
PROT SERPL-MCNC: 5 G/DL (ref 6.4–8.2)
PROTHROMBIN TIME: 11.9 SEC (ref 9–11.1)
RBC # BLD AUTO: 3.17 M/UL (ref 3.8–5.2)
SODIUM SERPL-SCNC: 141 MMOL/L (ref 136–145)
TIBC SERPL-MCNC: 213 UG/DL (ref 250–450)
TROPONIN I SERPL HS-MCNC: 8 NG/L (ref 0–51)
TSH SERPL DL<=0.05 MIU/L-ACNC: 0.98 UIU/ML (ref 0.36–3.74)
VIT B12 SERPL-MCNC: 619 PG/ML (ref 193–986)
WBC # BLD AUTO: 8.9 K/UL (ref 3.6–11)

## 2023-10-07 PROCEDURE — 84484 ASSAY OF TROPONIN QUANT: CPT

## 2023-10-07 PROCEDURE — 6370000000 HC RX 637 (ALT 250 FOR IP): Performed by: ORTHOPAEDIC SURGERY

## 2023-10-07 PROCEDURE — G0378 HOSPITAL OBSERVATION PER HR: HCPCS

## 2023-10-07 PROCEDURE — 82607 VITAMIN B-12: CPT

## 2023-10-07 PROCEDURE — 85025 COMPLETE CBC W/AUTO DIFF WBC: CPT

## 2023-10-07 PROCEDURE — 97116 GAIT TRAINING THERAPY: CPT

## 2023-10-07 PROCEDURE — 82746 ASSAY OF FOLIC ACID SERUM: CPT

## 2023-10-07 PROCEDURE — 93010 ELECTROCARDIOGRAM REPORT: CPT | Performed by: INTERNAL MEDICINE

## 2023-10-07 PROCEDURE — 83735 ASSAY OF MAGNESIUM: CPT

## 2023-10-07 PROCEDURE — 82728 ASSAY OF FERRITIN: CPT

## 2023-10-07 PROCEDURE — 2580000003 HC RX 258: Performed by: INTERNAL MEDICINE

## 2023-10-07 PROCEDURE — 84443 ASSAY THYROID STIM HORMONE: CPT

## 2023-10-07 PROCEDURE — 2580000003 HC RX 258: Performed by: ORTHOPAEDIC SURGERY

## 2023-10-07 PROCEDURE — 6360000004 HC RX CONTRAST MEDICATION: Performed by: RADIOLOGY

## 2023-10-07 PROCEDURE — 80053 COMPREHEN METABOLIC PANEL: CPT

## 2023-10-07 PROCEDURE — 97530 THERAPEUTIC ACTIVITIES: CPT

## 2023-10-07 PROCEDURE — 85379 FIBRIN DEGRADATION QUANT: CPT

## 2023-10-07 PROCEDURE — 71275 CT ANGIOGRAPHY CHEST: CPT

## 2023-10-07 PROCEDURE — 83550 IRON BINDING TEST: CPT

## 2023-10-07 PROCEDURE — 1100000003 HC PRIVATE W/ TELEMETRY

## 2023-10-07 PROCEDURE — 36415 COLL VENOUS BLD VENIPUNCTURE: CPT

## 2023-10-07 PROCEDURE — 93970 EXTREMITY STUDY: CPT

## 2023-10-07 PROCEDURE — 85610 PROTHROMBIN TIME: CPT

## 2023-10-07 PROCEDURE — 83540 ASSAY OF IRON: CPT

## 2023-10-07 PROCEDURE — 83880 ASSAY OF NATRIURETIC PEPTIDE: CPT

## 2023-10-07 PROCEDURE — 84100 ASSAY OF PHOSPHORUS: CPT

## 2023-10-07 RX ORDER — SODIUM CHLORIDE 9 MG/ML
INJECTION, SOLUTION INTRAVENOUS CONTINUOUS
Status: DISCONTINUED | OUTPATIENT
Start: 2023-10-07 | End: 2023-10-07 | Stop reason: HOSPADM

## 2023-10-07 RX ORDER — WARFARIN SODIUM 4 MG/1
4 TABLET ORAL
Status: COMPLETED | OUTPATIENT
Start: 2023-10-07 | End: 2023-10-07

## 2023-10-07 RX ADMIN — Medication 1 TABLET: at 09:09

## 2023-10-07 RX ADMIN — Medication 2000 UNITS: at 09:14

## 2023-10-07 RX ADMIN — SODIUM CHLORIDE: 9 INJECTION, SOLUTION INTRAVENOUS at 00:44

## 2023-10-07 RX ADMIN — OXYCODONE HYDROCHLORIDE 5 MG: 5 TABLET ORAL at 12:36

## 2023-10-07 RX ADMIN — FAMOTIDINE 20 MG: 20 TABLET ORAL at 09:09

## 2023-10-07 RX ADMIN — SODIUM CHLORIDE: 9 INJECTION, SOLUTION INTRAVENOUS at 09:14

## 2023-10-07 RX ADMIN — ACETAMINOPHEN 650 MG: 325 TABLET ORAL at 09:09

## 2023-10-07 RX ADMIN — WARFARIN SODIUM 4 MG: 4 TABLET ORAL at 12:36

## 2023-10-07 RX ADMIN — SODIUM CHLORIDE, PRESERVATIVE FREE 10 ML: 5 INJECTION INTRAVENOUS at 09:09

## 2023-10-07 RX ADMIN — IOPAMIDOL 90 ML: 755 INJECTION, SOLUTION INTRAVENOUS at 02:56

## 2023-10-07 ASSESSMENT — PAIN DESCRIPTION - LOCATION: LOCATION: KNEE

## 2023-10-07 ASSESSMENT — PAIN DESCRIPTION - ORIENTATION: ORIENTATION: LEFT

## 2023-10-07 ASSESSMENT — PAIN SCALES - GENERAL
PAINLEVEL_OUTOF10: 5
PAINLEVEL_OUTOF10: 2

## 2023-10-07 ASSESSMENT — PAIN DESCRIPTION - DESCRIPTORS: DESCRIPTORS: TIGHTNESS;THROBBING;ACHING

## 2023-10-07 NOTE — PLAN OF CARE
Problem: Physical Therapy - Adult  Goal: By Discharge: Performs mobility at highest level of function for planned discharge setting. See evaluation for individualized goals. Description: Problem: Physical Therapy - Adult  Goal: By Discharge: Performs mobility at highest level of function for planned discharge setting. See evaluation for individualized goals. Description: FUNCTIONAL STATUS PRIOR TO ADMISSION: Patient was independent and active without use of DME.    HOME SUPPORT PRIOR TO ADMISSION: The patient lived alone but did not require assistance. Physical Therapy Goals  Initiated 10/6/2023  1. Patient will move from supine to sit and sit to supine, scoot up and down, and roll side to side in bed with modified independence within 4 day(s). 2.  Patient will perform sit to stand with modified independence within 4 day(s). 3.  Patient will transfer from bed to chair and chair to bed with modified independence using the least restrictive device within 4 day(s). 4.  Patient will ambulate with modified independence for 150 feet with the least restrictive device within 4 day(s). 5.  Patient will ascend/descend 4 stairs with both handrail(s) with modified independence within 4 day(s). 6. Patient will perform post-YESSENIA home exercise program per protocol with independence within 4 days. 7. Patient will demonstrate AROM 0-90 degrees in operative joint within 4 days. Outcome: Progressing   PHYSICAL THERAPY TREATMENT    Patient: Todd Barnett (68 y.o. female)  Date: 10/7/2023  Diagnosis: Primary osteoarthritis of left knee [M17.12]  Osteoarthritis of left knee, unspecified osteoarthritis type [M17.12] Osteoarthritis of left knee, unspecified osteoarthritis type  Procedure(s) (LRB):  LEFT TOTAL KNEE ARTHROPLASTY (GEN W/BLOCK) (Left) 2 Days Post-Op  Precautions:                      ASSESSMENT:  Patient continues to benefit from skilled PT services and is progressing towards goals.  Patient was cleared by []                                        []                                           Straight Leg Raises   []                                        []                                        []                                        []                                                                                                                                                                                                                                                                Pain Rating:  Pt did not quantify L knee pain  Pain Intervention(s):   ice    Activity Tolerance:   Fair     After treatment:   Patient left in no apparent distress sitting up in chair and Call bell within reach      COMMUNICATION/EDUCATION:   The patient's plan of care was discussed with: registered nurse and physician    Patient Education  Education Given To: Patient  Education Provided: Role of Therapy;Transfer Training;Plan of Care;Equipment; Energy Conservation;Home Exercise Program;Fall Prevention Strategies  Education Method: Demonstration;Verbal;Teach Back  Barriers to Learning: None  Education Outcome: Verbalized understanding;Demonstrated understanding      Nuria Mccloud PT  Minutes: 28

## 2023-10-07 NOTE — CONSULTS
[id number] [facility]    Name:  Annie mAos  MR#:  [id number]  :  1950  ACCOUNT #:  [de-identified]  DATE OF SERVICE:  10/07/2023      The patient was seen and evaluated for medical consult by me on 10/07/2023. PRIMARY CARE PHYSICIAN:  Ruy Aragon MD    REQUESTING PHYSICIAN:  Jessica Wheeler MD    SOURCE OF INFORMATION:  The patient and review of electronic medical records. REASON FOR CONSULTATION:  Postoperative tachycardia. CHIEF COMPLAINT:  Right knee pain. HISTORY OF PRESENT ILLNESS:  This is a 71-year-old woman with past medical history significant for degenerative disk disease, was brought to the hospital and underwent elective left total knee arthroplasty. The procedure was uneventful. The patient was admitted to the orthopedic service after the surgery. The patient was doing relatively well until yesterday evening when the patient developed tachycardia. The patient denies associated chest pain or shortness of breath. No prior history of heart disease. Medical consult was requested to assist in further evaluation and treatment of postoperative tachycardia. PAST MEDICAL HISTORY:  Degenerative disk disease. ALLERGIES:  NO KNOWN DRUG ALLERGIES. MEDICATIONS PRIOR TO ADMISSION:  1. Motrin 200 mg three tablets twice daily. 2.  Multivitamin 1 tablet daily. FAMILY HISTORY:  This was reviewed. Her mother had thyroid cancer, hypertension, and stroke. Her father had brain lesion. PAST SURGICAL HISTORY:  This is significant for lumbar fusion and left knee arthroplasty. SOCIAL HISTORY:  The patient is a former smoker, quit tobacco abuse in . No history of alcohol abuse. REVIEW OF SYSTEMS:  HEAD, EYES, EARS, NOSE AND THROAT:  No headache. No dizziness. No blurring of vision. No photophobia. RESPIRATORY SYSTEM:  No cough. No shortness of breath. No hemoptysis. CARDIOVASCULAR SYSTEM:  No chest pain. No orthopnea.   No

## 2023-10-07 NOTE — ED NOTES
Hospitalist Atiya Wharton) notified of patients HR being in the 130s-140s consistently. Pt not in any distress, asymptomatic. VSS. No new orders given at this time. Plan of care continued.

## 2023-10-10 LAB
EKG ATRIAL RATE: 94 BPM
EKG DIAGNOSIS: NORMAL
EKG P AXIS: 46 DEGREES
EKG P-R INTERVAL: 160 MS
EKG Q-T INTERVAL: 332 MS
EKG QRS DURATION: 84 MS
EKG QTC CALCULATION (BAZETT): 415 MS
EKG R AXIS: 32 DEGREES
EKG T AXIS: 43 DEGREES
EKG VENTRICULAR RATE: 94 BPM

## 2023-10-10 PROCEDURE — 93010 ELECTROCARDIOGRAM REPORT: CPT | Performed by: INTERNAL MEDICINE

## 2023-10-11 NOTE — DISCHARGE SUMMARY
Patient ID:  Karlee Kimble  [id number]  68 y.o.  1950    Admit Date: 10/5/2023    Discharge Date: 10/11/2023    Admission Diagnoses: Primary osteoarthritis of left knee [M17.12]  Osteoarthritis of left knee, unspecified osteoarthritis type [M17.12]    Discharge Diagnoses:  Principal Problem:    Osteoarthritis of left knee, unspecified osteoarthritis type  Active Problems:    Heart rate fast  Resolved Problems:    * No resolved hospital problems. *       Admission Condition: Good    Discharge Condition: Good    Last Procedure: Procedure(s):  LEFT TOTAL KNEE ARTHROPLASTY (GEN W/BLOCK)      Medications:   No current facility-administered medications for this encounter. Current Outpatient Medications   Medication Sig    warfarin (COUMADIN) 2.5 MG tablet Take 1 tablet by mouth daily    docusate sodium (COLACE) 100 MG capsule Take 1 capsule by mouth daily    Multiple Vitamins-Minerals (THERAPEUTIC MULTIVITAMIN-MINERALS) tablet Take 1 tablet by mouth daily    Cholecalciferol (VITAMIN D) 50 MCG (2000 UT) CAPS capsule Take by mouth daily       Hospital Course: The patient was admitted to the hospital on the day of surgery and underwent total knee arthroplasty. They tolerated the procedure well. Pain was controlled post-operatively with a multimodal pain regiment. Physical therapy began to work with the patient on POD#0 and continued daily. 24 hours of perioperative antibiotics were completed. Warfarin was given for DVT prophylaxis beginning on POD#1. Patient did develop asymptomatic tachycardia. The Hospitalist team was consulted. CTA was ordered and no PE was discovered. It was felt that her tachycardia was secondary to post operative anemia. They remained asymptomatic and her HR decreased into the 80s. The patient progressed well and was discharged home in stable condition once they cleared therapy.      Consults: Hospitalist    Significant Diagnostic Studies: post op xrays showed a stable Procedure(s):  LEFT

## 2023-10-25 ENCOUNTER — HOSPITAL ENCOUNTER (EMERGENCY)
Facility: HOSPITAL | Age: 73
Discharge: HOME | End: 2023-10-25
Attending: EMERGENCY MEDICINE
Payer: COMMERCIAL

## 2023-10-25 VITALS
TEMPERATURE: 98 F | BODY MASS INDEX: 36.63 KG/M2 | HEART RATE: 80 BPM | OXYGEN SATURATION: 96 % | HEIGHT: 62 IN | DIASTOLIC BLOOD PRESSURE: 77 MMHG | WEIGHT: 199.08 LBS | RESPIRATION RATE: 16 BRPM | SYSTOLIC BLOOD PRESSURE: 130 MMHG

## 2023-10-25 DIAGNOSIS — R04.0 EPISTAXIS: Primary | ICD-10-CM

## 2023-10-25 LAB
ALBUMIN SERPL-MCNC: 3.1 G/DL (ref 3.5–5)
ALBUMIN/GLOB SERPL: 0.8 (ref 1.1–2.2)
ALP SERPL-CCNC: 146 U/L (ref 45–117)
ALT SERPL-CCNC: 16 U/L (ref 12–78)
ANION GAP SERPL CALC-SCNC: 3 MMOL/L (ref 5–15)
AST SERPL-CCNC: 13 U/L (ref 15–37)
BASOPHILS # BLD: 0 K/UL (ref 0–0.1)
BASOPHILS NFR BLD: 1 % (ref 0–1)
BILIRUB SERPL-MCNC: 0.4 MG/DL (ref 0.2–1)
BUN SERPL-MCNC: 22 MG/DL (ref 6–20)
BUN/CREAT SERPL: 26 (ref 12–20)
CALCIUM SERPL-MCNC: 9.2 MG/DL (ref 8.5–10.1)
CHLORIDE SERPL-SCNC: 109 MMOL/L (ref 97–108)
CO2 SERPL-SCNC: 26 MMOL/L (ref 21–32)
COMMENT:: NORMAL
CREAT SERPL-MCNC: 0.84 MG/DL (ref 0.55–1.02)
DIFFERENTIAL METHOD BLD: ABNORMAL
EKG ATRIAL RATE: 77 BPM
EKG DIAGNOSIS: NORMAL
EKG P AXIS: 49 DEGREES
EKG P-R INTERVAL: 152 MS
EKG Q-T INTERVAL: 344 MS
EKG QRS DURATION: 86 MS
EKG QTC CALCULATION (BAZETT): 389 MS
EKG R AXIS: 19 DEGREES
EKG T AXIS: 28 DEGREES
EKG VENTRICULAR RATE: 77 BPM
EOSINOPHIL # BLD: 0 K/UL (ref 0–0.4)
EOSINOPHIL NFR BLD: 0 % (ref 0–7)
ERYTHROCYTE [DISTWIDTH] IN BLOOD BY AUTOMATED COUNT: 14.8 % (ref 11.5–14.5)
GLOBULIN SER CALC-MCNC: 3.9 G/DL (ref 2–4)
GLUCOSE SERPL-MCNC: 143 MG/DL (ref 65–100)
HCT VFR BLD AUTO: 33.5 % (ref 35–47)
HGB BLD-MCNC: 10.6 G/DL (ref 11.5–16)
IMM GRANULOCYTES # BLD AUTO: 0 K/UL (ref 0–0.04)
IMM GRANULOCYTES NFR BLD AUTO: 0 % (ref 0–0.5)
INR PPP: 2.7 (ref 0.9–1.1)
LYMPHOCYTES # BLD: 0.8 K/UL (ref 0.8–3.5)
LYMPHOCYTES NFR BLD: 9 % (ref 12–49)
MCH RBC QN AUTO: 30.3 PG (ref 26–34)
MCHC RBC AUTO-ENTMCNC: 31.6 G/DL (ref 30–36.5)
MCV RBC AUTO: 95.7 FL (ref 80–99)
MONOCYTES # BLD: 0.7 K/UL (ref 0–1)
MONOCYTES NFR BLD: 9 % (ref 5–13)
NEUTS SEG # BLD: 6.8 K/UL (ref 1.8–8)
NEUTS SEG NFR BLD: 81 % (ref 32–75)
NRBC # BLD: 0 K/UL (ref 0–0.01)
NRBC BLD-RTO: 0 PER 100 WBC
PLATELET # BLD AUTO: 430 K/UL (ref 150–400)
PMV BLD AUTO: 9.4 FL (ref 8.9–12.9)
POTASSIUM SERPL-SCNC: 3.9 MMOL/L (ref 3.5–5.1)
PROT SERPL-MCNC: 7 G/DL (ref 6.4–8.2)
PROTHROMBIN TIME: 26.4 SEC (ref 9–11.1)
RBC # BLD AUTO: 3.5 M/UL (ref 3.8–5.2)
SODIUM SERPL-SCNC: 138 MMOL/L (ref 136–145)
SPECIMEN HOLD: NORMAL
WBC # BLD AUTO: 8.5 K/UL (ref 3.6–11)

## 2023-10-25 PROCEDURE — 6370000000 HC RX 637 (ALT 250 FOR IP): Performed by: EMERGENCY MEDICINE

## 2023-10-25 PROCEDURE — APPNB15 APP NON BILLABLE TIME 0-15 MINS: Performed by: PHYSICIAN ASSISTANT

## 2023-10-25 PROCEDURE — 93010 ELECTROCARDIOGRAM REPORT: CPT | Performed by: SPECIALIST

## 2023-10-25 PROCEDURE — 30901 CONTROL OF NOSEBLEED: CPT

## 2023-10-25 PROCEDURE — 85610 PROTHROMBIN TIME: CPT

## 2023-10-25 PROCEDURE — 80053 COMPREHEN METABOLIC PANEL: CPT

## 2023-10-25 PROCEDURE — 99284 EMERGENCY DEPT VISIT MOD MDM: CPT

## 2023-10-25 PROCEDURE — 96360 HYDRATION IV INFUSION INIT: CPT

## 2023-10-25 PROCEDURE — 93005 ELECTROCARDIOGRAM TRACING: CPT | Performed by: EMERGENCY MEDICINE

## 2023-10-25 PROCEDURE — 2580000003 HC RX 258: Performed by: EMERGENCY MEDICINE

## 2023-10-25 PROCEDURE — 85025 COMPLETE CBC W/AUTO DIFF WBC: CPT

## 2023-10-25 PROCEDURE — 36415 COLL VENOUS BLD VENIPUNCTURE: CPT

## 2023-10-25 RX ORDER — CLOBETASOL PROPIONATE 0.5 MG/G
OINTMENT TOPICAL DAILY PRN
COMMUNITY

## 2023-10-25 RX ORDER — AMOXICILLIN 500 MG/1
500 CAPSULE ORAL 2 TIMES DAILY
Qty: 14 CAPSULE | Refills: 0 | Status: SHIPPED | OUTPATIENT
Start: 2023-10-25 | End: 2023-11-01

## 2023-10-25 RX ORDER — 0.9 % SODIUM CHLORIDE 0.9 %
1000 INTRAVENOUS SOLUTION INTRAVENOUS ONCE
Status: COMPLETED | OUTPATIENT
Start: 2023-10-25 | End: 2023-10-25

## 2023-10-25 RX ORDER — FAMOTIDINE 20 MG/1
20 TABLET, FILM COATED ORAL DAILY PRN
COMMUNITY

## 2023-10-25 RX ADMIN — SODIUM CHLORIDE 1000 ML: 9 INJECTION, SOLUTION INTRAVENOUS at 13:00

## 2023-10-25 RX ADMIN — Medication 5 MG: at 12:14

## 2023-10-25 ASSESSMENT — ENCOUNTER SYMPTOMS
SORE THROAT: 0
ABDOMINAL PAIN: 0
COUGH: 0
BACK PAIN: 0

## 2023-10-25 ASSESSMENT — LIFESTYLE VARIABLES
HOW OFTEN DO YOU HAVE A DRINK CONTAINING ALCOHOL: NEVER
HOW MANY STANDARD DRINKS CONTAINING ALCOHOL DO YOU HAVE ON A TYPICAL DAY: PATIENT DOES NOT DRINK

## 2023-10-25 NOTE — PROCEDURES
Ortho Progress    Notified by ER of patient's arrival with complaints of epistaxis while on urine following her left total knee arthroplasty approximately 3 weeks ago. Patient states that overall his her knee is doing fairly well. She has some pain but is only been using Tylenol for her discomfort. Is working with therapy although she had to miss her appointment today. Emergency department working on controlling epistaxis. Have spoken with Dr. Sandy Lorenzo her attending surgeon who indicates that she may come off of her warfarin at this time but should start using aspirin 325 mg twice a day.     Dr. Sandy Lorenzo aware of patient and in agreement with current plan of care    Arash Carroll PA-C  Orthopedic Trauma Service  [address] [facility]

## 2023-10-25 NOTE — ED PROVIDER NOTES
[facility] EMERGENCY DEP  EMERGENCY DEPARTMENT ENCOUNTER      Pt Name: Sia Ramirez  MRN: [id number]  [address] [facility] [place] 1950  Date of evaluation: 10/25/2023  Provider: Jeanette Singh MD    CHIEF COMPLAINT       Chief Complaint   Patient presents with    Epistaxis         HISTORY OF PRESENT ILLNESS    Sia Ramirez is a 69 yo F with nose bleed. She states is is taking warfarin following knee replacement surgery but has not had a DVT. Her nose bleed started around 1am and she was able to get it controled after rinsing her nose out with cold water but this morning when she blew her nose it restarted and it has not stopped since. A wooden tongue depressor clamp was applied in triage and now her bleeding is controlled. She is not sure what her INR goal is or how long she is to continue warfarin. She denies chest pain or lightheadedness. Additional history from independent historians:     Review of External Medical Records:     Nursing Notes were reviewed. REVIEW OF SYSTEMS       Review of Systems   Constitutional:  Negative for fever. HENT:  Positive for nosebleeds. Negative for sore throat. Eyes:  Negative for visual disturbance. Respiratory:  Negative for cough. Cardiovascular:  Negative for chest pain. Gastrointestinal:  Negative for abdominal pain. Genitourinary:  Negative for dysuria. Musculoskeletal:  Negative for back pain. Skin:  Negative for rash. Neurological:  Negative for headaches. Except as noted above the remainder of the review of systems was reviewed and negative.        PAST MEDICAL HISTORY     Past Medical History:   Diagnosis Date    Achilles tendon tear     GERD (gastroesophageal reflux disease)     Osteoarthritis     Varicose vein of leg          SURGICAL HISTORY       Past Surgical History:   Procedure Laterality Date    APPENDECTOMY  1984    BACK SURGERY  2019    LUMBAR FUSION L3-5    BREAST BIOPSY Right     FATTY LUMP - BENIGN    CYSTOCELE REPAIR

## 2023-10-25 NOTE — ED NOTES
Assumed care of pt at this time. Md at bedside.       Nimco Tejada RN  10/25/23 2358
Discharge instructions provided. Pt was given copy of discharge instructions with 0 paper script(s) and 1 electronic script(s). Pt verbalized understanding of the medication instructions, and the importance of following up as recommended by EDP. Pt has no further questions at this time. Wheelchair offered from treatment area to hospital entrance, pt accepted. Pt leaving ED in wheelchair and in stable condition.         Arturo Prado RN  10/25/23 2159
No

## 2023-10-25 NOTE — ED TRIAGE NOTES
Triage Note: pt arrives via EMS from home for epistaxis. Had initial episode at 0100 this morning and was able to get it controlled. Bleeding restarted at 0800 this am and pt was unable to control bleeding. Mild bleeding noted from bialt nares on arrival. Pt recently had left knee surgery and is on coumadin.

## 2024-01-22 ENCOUNTER — HOSPITAL ENCOUNTER (OUTPATIENT)
Facility: HOSPITAL | Age: 74
Discharge: HOME | End: 2024-01-25
Payer: COMMERCIAL

## 2024-01-22 DIAGNOSIS — Z13.820 ENCOUNTER FOR SCREENING FOR OSTEOPOROSIS: ICD-10-CM

## 2024-01-22 DIAGNOSIS — Z78.0 ASYMPTOMATIC MENOPAUSAL STATE: ICD-10-CM

## 2024-01-22 DIAGNOSIS — Z12.31 SCREENING MAMMOGRAM, ENCOUNTER FOR: ICD-10-CM

## 2024-01-22 PROCEDURE — 77063 BREAST TOMOSYNTHESIS BI: CPT

## 2024-01-22 PROCEDURE — 77080 DXA BONE DENSITY AXIAL: CPT

## 2025-04-07 ENCOUNTER — TRANSCRIBE ORDERS (OUTPATIENT)
Facility: HOSPITAL | Age: 75
End: 2025-04-07

## 2025-04-07 DIAGNOSIS — Z12.31 SCREENING MAMMOGRAM, ENCOUNTER FOR: Primary | ICD-10-CM

## (undated) DEVICE — DRAPE,EXTREMITY,89X128,STERILE: Brand: MEDLINE

## (undated) DEVICE — STRAP,POSITIONING,KNEE/BODY,FOAM,4X60": Brand: MEDLINE

## (undated) DEVICE — SUTURE STRATAFIX SYMMETRIC PDS + SZ 1 L18IN ABSRB VLT L48MM SXPP1A400

## (undated) DEVICE — KENDALL SCD EXPRESS SLEEVES, KNEE LENGTH, MEDIUM: Brand: KENDALL SCD

## (undated) DEVICE — STERILE POLYISOPRENE POWDER-FREE SURGICAL GLOVES WITH EMOLLIENT COATING: Brand: PROTEXIS

## (undated) DEVICE — PREP SKN PREVAIL 40ML APPL --

## (undated) DEVICE — SOLUTION IV 250ML 0.9% SOD CHL CLR INJ FLX BG CONT PRT CLSR

## (undated) DEVICE — BIPOLAR IRRIGATOR INTEGRATED TUBING AND BIPOLAR CORD SET, DISPOSABLE

## (undated) DEVICE — TRAY CATH 16F URIN MTR LTX -- CONVERT TO ITEM 363111

## (undated) DEVICE — PAD,ABDOMINAL,5"X9",ST,LF,25/BX: Brand: MEDLINE INDUSTRIES, INC.

## (undated) DEVICE — MASTISOL ADHESIVE LIQ 2/3ML

## (undated) DEVICE — HANDPIECE SET WITH BONE CLEANING TIP AND SUCTION TUBE: Brand: INTERPULSE

## (undated) DEVICE — COVER,TABLE,HEAVY DUTY,60"X90",STRL: Brand: MEDLINE

## (undated) DEVICE — COVER,MAYO STAND,STERILE: Brand: MEDLINE

## (undated) DEVICE — GOWN,SIRUS,FABRNF,XL,20/CS: Brand: MEDLINE

## (undated) DEVICE — GOWN,SIRUS,NONRNF,SETINSLV,2XL,18/CS: Brand: MEDLINE

## (undated) DEVICE — SYRINGE 20ML LL S/C 50

## (undated) DEVICE — SOLUTION IRRIG 3000ML 0.9% SOD CHL FLX CONT 0797208] ICU MEDICAL INC]

## (undated) DEVICE — TOOL 14BA50 LEGEND 14CM 5MM BA: Brand: MIDAS REX ™

## (undated) DEVICE — 3M™ STERI-DRAPE™ U-DRAPE 1015: Brand: STERI-DRAPE™

## (undated) DEVICE — TUBING SUCT 12FR MAL ALUM SHFT FN CAP VENT UNIV CONN W/ OBT

## (undated) DEVICE — GAUZE SPONGES,12 PLY: Brand: CURITY

## (undated) DEVICE — SUTURE ABSRB BRAID COAT UD OS-6 NO 1 27IN VCRL J535H

## (undated) DEVICE — PIN EXT FIX SCHNZ 3 MM

## (undated) DEVICE — 450 ML BOTTLE OF 0.05% CHLORHEXIDINE GLUCONATE IN 99.95% STERILE WATER FOR IRRIGATION, USP AND APPLICATOR.: Brand: IRRISEPT ANTIMICROBIAL WOUND LAVAGE

## (undated) DEVICE — INFECTION CONTROL KIT SYS

## (undated) DEVICE — ZIMMER® STERILE DISPOSABLE TOURNIQUET CUFF WITH PLC, DUAL PORT, SINGLE BLADDER, 34 IN. (86 CM)

## (undated) DEVICE — LIGHT HANDLE: Brand: DEVON

## (undated) DEVICE — INTENT OT USE PROVIDES A STERILE INTERFACE BETWEEN THE OPERATING ROOM SURGICAL LAMPS (NON-STERILE) AND THE SURGEON OR STAFF WORKING IN THE STERILE FIELD.: Brand: ASPEN® ALC PLUS LIGHT HANDLE COVER

## (undated) DEVICE — TUBING, SUCTION, 1/4" X 12', STRAIGHT: Brand: MEDLINE

## (undated) DEVICE — 2108 SERIES SAGITTAL BLADE AGGRESSIVE  (25.0 X 1.19 X 85.0MM)

## (undated) DEVICE — HANDLE LT SNAP ON ULT DURABLE LENS FOR TRUMPF ALC DISPOSABLE

## (undated) DEVICE — SUTURE VCRL SZ 2 L54IN ABSRB UD L65MM TP-1 1/2 CIR J880T

## (undated) DEVICE — GLOVE ORANGE PI 8 1/2   MSG9085

## (undated) DEVICE — FLOSEAL HEMOSTATIC MATRIX, 10 ML: Brand: FLOSEAL

## (undated) DEVICE — 3M™ IOBAN™ 2 ANTIMICROBIAL INCISE DRAPE 6651EZ: Brand: IOBAN™ 2

## (undated) DEVICE — SOLUTION IRRIG 1000ML STRL H2O USP PLAS POUR BTL

## (undated) DEVICE — NEEDLE HYPO 22GA L1.5IN BLK S STL HUB POLYPR SHLD REG BVL

## (undated) DEVICE — CONTAINER,SPECIMEN,3OZ,OR STRL: Brand: MEDLINE

## (undated) DEVICE — PACK SURG PROC KNEE USER GPS

## (undated) DEVICE — SUTURE VCRL SZ 2-0 L36IN ABSRB UD L40MM CT 1/2 CIR J957H

## (undated) DEVICE — CUSTOM CAST PD STR

## (undated) DEVICE — TOTAL JOINT - SMH: Brand: MEDLINE INDUSTRIES, INC.

## (undated) DEVICE — X-RAY SPONGES,16 PLY: Brand: DERMACEA

## (undated) DEVICE — LAMINECTOMY RICHMOND-LF: Brand: MEDLINE INDUSTRIES, INC.

## (undated) DEVICE — SUTURE VCRL SZ 3-0 L27IN ABSRB UD CP-2 L26MM 1/2 CIR REV J868H

## (undated) DEVICE — SUTURE MCRYL SZ 4-0 L27IN ABSRB UD L24MM PS-1 3/8 CIR PRIM Y935H

## (undated) DEVICE — Device

## (undated) DEVICE — TAPE,CLOTH/SILK,CURAD,3"X10YD,LF,40/CS: Brand: CURAD

## (undated) DEVICE — PENCIL SMK EVAC 10 FT BLADE ELECTRD ROCKER FOR TELSCP

## (undated) DEVICE — GARMENT,MEDLINE,DVT,INT,CALF,MED, GEN2: Brand: MEDLINE

## (undated) DEVICE — STRYKER PERFORMANCE SERIES SAGITTAL BLADE: Brand: STRYKER PERFORMANCE SERIES

## (undated) DEVICE — SOLUTION IRRIG 3000ML 0.9% SOD CHL USP UROMATIC PLAS CONT

## (undated) DEVICE — ZIP 8I SURGICAL SKIN CLOSURE DEVICE: Brand: ZIP 8I SURGICAL SKIN CLOSURE DEVICE

## (undated) DEVICE — SCRUB DRY SURG EZ SCRUB BRUSH PREOPERATIVE GRN

## (undated) DEVICE — PREP KIT PEEL PTCH POVIDONE IOD

## (undated) DEVICE — SOLUTION SURG PREP 26 CC PURPREP

## (undated) DEVICE — 4-PORT MANIFOLD: Brand: NEPTUNE 2

## (undated) DEVICE — STRIP,CLOSURE,WOUND,MEDI-STRIP,1/2X4: Brand: MEDLINE

## (undated) DEVICE — SPONGE GZ W4XL4IN COT 12 PLY TYP VII WVN C FLD DSGN STERILE

## (undated) DEVICE — SMOKE EVACUATION PENCIL: Brand: VALLEYLAB

## (undated) DEVICE — SYR 20ML LL STRL LF --

## (undated) DEVICE — SUTURE VCRL 2-0 L27IN ABSRB UD CP-2 L26MM 1/2 CIR REV CUT J869H

## (undated) DEVICE — ABDOMINAL PAD: Brand: DERMACEA

## (undated) DEVICE — GOWN,NON-REINFORCED,XXL: Brand: MEDLINE

## (undated) DEVICE — DRAIN KT WND 10FR RND 400ML --

## (undated) DEVICE — SOLUTION IRRIG 1000ML H2O STRL BLT

## (undated) DEVICE — CONTAINER,SPECIMEN,4OZ,OR STRL: Brand: MEDLINE

## (undated) DEVICE — REM POLYHESIVE ADULT PATIENT RETURN ELECTRODE: Brand: VALLEYLAB

## (undated) DEVICE — HYPODERMIC SAFETY NEEDLE: Brand: MAGELLAN

## (undated) DEVICE — BANDAGE COMPR M W6INXL10YD WHT BGE VELC E MTRX HK AND LOOP

## (undated) DEVICE — DRAPE XR C ARM 41X74IN LF --

## (undated) DEVICE — PADDING CAST SPEC 6INX4YD COT --

## (undated) DEVICE — PADDING CAST W6INXL4YD NONSTERILE COT RAYON MICROPLEATED

## (undated) DEVICE — PILLOW POS AD L7IN R FOAM HD REST INTUB SLOT DISP

## (undated) DEVICE — BONE WAX WHITE: Brand: BONE WAX WHITE

## (undated) DEVICE — DRESSING STERILE PETRO W3XL8IN N ADH OIL EMUL GZ CURAD